# Patient Record
Sex: MALE | Race: WHITE | NOT HISPANIC OR LATINO | URBAN - METROPOLITAN AREA
[De-identification: names, ages, dates, MRNs, and addresses within clinical notes are randomized per-mention and may not be internally consistent; named-entity substitution may affect disease eponyms.]

---

## 2017-12-13 ENCOUNTER — INPATIENT (INPATIENT)
Facility: HOSPITAL | Age: 62
LOS: 15 days | Discharge: NURSING, REHAB OR LONG-TERM CARE | DRG: 269 | End: 2017-12-29
Attending: SURGERY | Admitting: SURGERY
Payer: COMMERCIAL

## 2017-12-13 VITALS
RESPIRATION RATE: 17 BRPM | OXYGEN SATURATION: 97 % | TEMPERATURE: 99 F | HEART RATE: 113 BPM | SYSTOLIC BLOOD PRESSURE: 126 MMHG | DIASTOLIC BLOOD PRESSURE: 74 MMHG | WEIGHT: 240.08 LBS

## 2017-12-13 LAB
ALBUMIN SERPL ELPH-MCNC: 4 G/DL — SIGNIFICANT CHANGE UP (ref 3.3–5)
ALP SERPL-CCNC: 79 U/L — SIGNIFICANT CHANGE UP (ref 40–120)
ALT FLD-CCNC: 29 U/L — SIGNIFICANT CHANGE UP (ref 10–45)
ANION GAP SERPL CALC-SCNC: 16 MMOL/L — SIGNIFICANT CHANGE UP (ref 5–17)
APPEARANCE UR: CLEAR — SIGNIFICANT CHANGE UP
APTT BLD: 25.5 SEC — LOW (ref 27.5–37.4)
AST SERPL-CCNC: 25 U/L — SIGNIFICANT CHANGE UP (ref 10–40)
BASOPHILS NFR BLD AUTO: 0.4 % — SIGNIFICANT CHANGE UP (ref 0–2)
BILIRUB SERPL-MCNC: 0.3 MG/DL — SIGNIFICANT CHANGE UP (ref 0.2–1.2)
BILIRUB UR-MCNC: NEGATIVE — SIGNIFICANT CHANGE UP
BUN SERPL-MCNC: 15 MG/DL — SIGNIFICANT CHANGE UP (ref 7–23)
CALCIUM SERPL-MCNC: 9.3 MG/DL — SIGNIFICANT CHANGE UP (ref 8.4–10.5)
CHLORIDE SERPL-SCNC: 93 MMOL/L — LOW (ref 96–108)
CO2 SERPL-SCNC: 27 MMOL/L — SIGNIFICANT CHANGE UP (ref 22–31)
COLOR SPEC: YELLOW — SIGNIFICANT CHANGE UP
CREAT SERPL-MCNC: 0.81 MG/DL — SIGNIFICANT CHANGE UP (ref 0.5–1.3)
DIFF PNL FLD: NEGATIVE — SIGNIFICANT CHANGE UP
EOSINOPHIL NFR BLD AUTO: 4.6 % — SIGNIFICANT CHANGE UP (ref 0–6)
GLUCOSE SERPL-MCNC: 136 MG/DL — HIGH (ref 70–99)
GLUCOSE UR QL: NEGATIVE — SIGNIFICANT CHANGE UP
HCT VFR BLD CALC: 43.9 % — SIGNIFICANT CHANGE UP (ref 39–50)
HGB BLD-MCNC: 14.3 G/DL — SIGNIFICANT CHANGE UP (ref 13–17)
INR BLD: 1.11 — SIGNIFICANT CHANGE UP (ref 0.88–1.16)
KETONES UR-MCNC: NEGATIVE — SIGNIFICANT CHANGE UP
LACTATE SERPL-SCNC: 2.4 MMOL/L — HIGH (ref 0.5–2)
LEUKOCYTE ESTERASE UR-ACNC: NEGATIVE — SIGNIFICANT CHANGE UP
LYMPHOCYTES # BLD AUTO: 9.6 % — LOW (ref 13–44)
MCHC RBC-ENTMCNC: 30.2 PG — SIGNIFICANT CHANGE UP (ref 27–34)
MCHC RBC-ENTMCNC: 32.6 G/DL — SIGNIFICANT CHANGE UP (ref 32–36)
MCV RBC AUTO: 92.8 FL — SIGNIFICANT CHANGE UP (ref 80–100)
MONOCYTES NFR BLD AUTO: 7.6 % — SIGNIFICANT CHANGE UP (ref 2–14)
NEUTROPHILS NFR BLD AUTO: 77.8 % — HIGH (ref 43–77)
NITRITE UR-MCNC: NEGATIVE — SIGNIFICANT CHANGE UP
PH UR: 6 — SIGNIFICANT CHANGE UP (ref 5–8)
PLATELET # BLD AUTO: 329 K/UL — SIGNIFICANT CHANGE UP (ref 150–400)
POTASSIUM SERPL-MCNC: 4.7 MMOL/L — SIGNIFICANT CHANGE UP (ref 3.5–5.3)
POTASSIUM SERPL-SCNC: 4.7 MMOL/L — SIGNIFICANT CHANGE UP (ref 3.5–5.3)
PROT SERPL-MCNC: 7.7 G/DL — SIGNIFICANT CHANGE UP (ref 6–8.3)
PROT UR-MCNC: NEGATIVE MG/DL — SIGNIFICANT CHANGE UP
PROTHROM AB SERPL-ACNC: 12.4 SEC — SIGNIFICANT CHANGE UP (ref 9.8–12.7)
RBC # BLD: 4.73 M/UL — SIGNIFICANT CHANGE UP (ref 4.2–5.8)
RBC # FLD: 12.9 % — SIGNIFICANT CHANGE UP (ref 10.3–16.9)
SODIUM SERPL-SCNC: 136 MMOL/L — SIGNIFICANT CHANGE UP (ref 135–145)
SP GR SPEC: <=1.005 — SIGNIFICANT CHANGE UP (ref 1–1.03)
UROBILINOGEN FLD QL: 0.2 E.U./DL — SIGNIFICANT CHANGE UP
WBC # BLD: 11.4 K/UL — HIGH (ref 3.8–10.5)
WBC # FLD AUTO: 11.4 K/UL — HIGH (ref 3.8–10.5)

## 2017-12-13 PROCEDURE — 99285 EMERGENCY DEPT VISIT HI MDM: CPT

## 2017-12-13 PROCEDURE — 75635 CT ANGIO ABDOMINAL ARTERIES: CPT | Mod: 26

## 2017-12-13 PROCEDURE — 71010: CPT | Mod: 26

## 2017-12-13 RX ORDER — PIPERACILLIN AND TAZOBACTAM 4; .5 G/20ML; G/20ML
4.5 INJECTION, POWDER, LYOPHILIZED, FOR SOLUTION INTRAVENOUS ONCE
Qty: 0 | Refills: 0 | Status: COMPLETED | OUTPATIENT
Start: 2017-12-13 | End: 2017-12-13

## 2017-12-13 RX ORDER — VANCOMYCIN HCL 1 G
1500 VIAL (EA) INTRAVENOUS ONCE
Qty: 0 | Refills: 0 | Status: COMPLETED | OUTPATIENT
Start: 2017-12-13 | End: 2017-12-13

## 2017-12-13 RX ORDER — ASPIRIN/CALCIUM CARB/MAGNESIUM 324 MG
0 TABLET ORAL
Qty: 0 | Refills: 0 | COMMUNITY

## 2017-12-13 RX ORDER — OXYCODONE AND ACETAMINOPHEN 5; 325 MG/1; MG/1
1 TABLET ORAL EVERY 4 HOURS
Qty: 0 | Refills: 0 | Status: DISCONTINUED | OUTPATIENT
Start: 2017-12-13 | End: 2017-12-19

## 2017-12-13 RX ORDER — CLOPIDOGREL BISULFATE 75 MG/1
0 TABLET, FILM COATED ORAL
Qty: 0 | Refills: 0 | COMMUNITY

## 2017-12-13 RX ORDER — SODIUM CHLORIDE 9 MG/ML
1000 INJECTION INTRAMUSCULAR; INTRAVENOUS; SUBCUTANEOUS ONCE
Qty: 0 | Refills: 0 | Status: COMPLETED | OUTPATIENT
Start: 2017-12-13 | End: 2017-12-13

## 2017-12-13 RX ORDER — MORPHINE SULFATE 50 MG/1
4 CAPSULE, EXTENDED RELEASE ORAL ONCE
Qty: 0 | Refills: 0 | Status: DISCONTINUED | OUTPATIENT
Start: 2017-12-13 | End: 2017-12-13

## 2017-12-13 RX ORDER — PIPERACILLIN AND TAZOBACTAM 4; .5 G/20ML; G/20ML
INJECTION, POWDER, LYOPHILIZED, FOR SOLUTION INTRAVENOUS
Qty: 0 | Refills: 0 | Status: DISCONTINUED | OUTPATIENT
Start: 2017-12-13 | End: 2017-12-19

## 2017-12-13 RX ORDER — HEPARIN SODIUM 5000 [USP'U]/ML
5000 INJECTION INTRAVENOUS; SUBCUTANEOUS EVERY 8 HOURS
Qty: 0 | Refills: 0 | Status: DISCONTINUED | OUTPATIENT
Start: 2017-12-13 | End: 2017-12-19

## 2017-12-13 RX ORDER — VANCOMYCIN HCL 1 G
1000 VIAL (EA) INTRAVENOUS EVERY 12 HOURS
Qty: 0 | Refills: 0 | Status: DISCONTINUED | OUTPATIENT
Start: 2017-12-13 | End: 2017-12-13

## 2017-12-13 RX ORDER — ASPIRIN/CALCIUM CARB/MAGNESIUM 324 MG
81 TABLET ORAL DAILY
Qty: 0 | Refills: 0 | Status: DISCONTINUED | OUTPATIENT
Start: 2017-12-13 | End: 2017-12-19

## 2017-12-13 RX ORDER — VANCOMYCIN HCL 1 G
1500 VIAL (EA) INTRAVENOUS EVERY 12 HOURS
Qty: 0 | Refills: 0 | Status: DISCONTINUED | OUTPATIENT
Start: 2017-12-14 | End: 2017-12-19

## 2017-12-13 RX ORDER — PIPERACILLIN AND TAZOBACTAM 4; .5 G/20ML; G/20ML
4.5 INJECTION, POWDER, LYOPHILIZED, FOR SOLUTION INTRAVENOUS EVERY 6 HOURS
Qty: 0 | Refills: 0 | Status: DISCONTINUED | OUTPATIENT
Start: 2017-12-14 | End: 2017-12-19

## 2017-12-13 RX ORDER — CLOPIDOGREL BISULFATE 75 MG/1
75 TABLET, FILM COATED ORAL DAILY
Qty: 0 | Refills: 0 | Status: DISCONTINUED | OUTPATIENT
Start: 2017-12-13 | End: 2017-12-19

## 2017-12-13 RX ORDER — OXYCODONE AND ACETAMINOPHEN 5; 325 MG/1; MG/1
2 TABLET ORAL EVERY 4 HOURS
Qty: 0 | Refills: 0 | Status: DISCONTINUED | OUTPATIENT
Start: 2017-12-13 | End: 2017-12-19

## 2017-12-13 RX ORDER — SODIUM CHLORIDE 9 MG/ML
1000 INJECTION INTRAMUSCULAR; INTRAVENOUS; SUBCUTANEOUS
Qty: 0 | Refills: 0 | Status: DISCONTINUED | OUTPATIENT
Start: 2017-12-13 | End: 2017-12-16

## 2017-12-13 RX ORDER — AMLODIPINE BESYLATE 2.5 MG/1
0 TABLET ORAL
Qty: 0 | Refills: 0 | COMMUNITY

## 2017-12-13 RX ADMIN — Medication 300 MILLIGRAM(S): at 20:23

## 2017-12-13 RX ADMIN — SODIUM CHLORIDE 333.33 MILLILITER(S): 9 INJECTION INTRAMUSCULAR; INTRAVENOUS; SUBCUTANEOUS at 21:10

## 2017-12-13 RX ADMIN — PIPERACILLIN AND TAZOBACTAM 200 GRAM(S): 4; .5 INJECTION, POWDER, LYOPHILIZED, FOR SOLUTION INTRAVENOUS at 23:26

## 2017-12-13 NOTE — H&P ADULT - HISTORY OF PRESENT ILLNESS
62yoM poor historian with PMHx of htn sent to ED by Dr Gonzalez for RLE nonhealing ulcers.  Was seen by wound care clinic in [facility] and was told to see Dr. Gonzalez in office today but due to being stuck in traffic patient could not make appointment and came straight to ED to be evaluated.  Patient states medial knee ulcer x1yr which began due to a fall and has had a debridement in office(11/11/17-not by Dr. Gonzalez) and posterior ankle ulcer [id number](no trauma).   Was seen by another vascular surgeon and was told patient needed surgery and patient at the time refused.  + reports d/c of asa / plavix/norvasc ~ 1 week and taking only Bumex and K+ meds now + weeping ulcerations requiring multiple dressing changes per day

## 2017-12-13 NOTE — ED ADULT NURSE NOTE - CHPI ED SYMPTOMS NEG
no chills/no vomiting/no weakness/no nausea/no numbness/no dizziness/no fever/no tingling/no decreased eating/drinking

## 2017-12-13 NOTE — ED PROVIDER NOTE - OBJECTIVE STATEMENT
patient referred to ED for vascular dr Gonzalez care per Plastics MD at home in [place] + lengthy > year long process at Lower extremities R>L of edema and wound care now grossly exacerbated this past week + reports d/c of asa / plavix/norvasc ~ 1 week and taking only Bumex and K+ meds now + weeping ulcerations requiring multiple dressing changes per day

## 2017-12-13 NOTE — ED ADULT NURSE NOTE - OBJECTIVE STATEMENT
Patient w/ dressing in place to right lower extremity, sent by Plastic Surgery MD for wound check due to worsening cellulitis. Patient w/ dressing in place to right lower extremity cellulitis, sent by Plastic Surgery MD/ wound center for wound check due to worsening cellulitis to be seen by vascular surgery and IV antibiotic. States symptoms/ wounds X 1 year,  became worse the past week.

## 2017-12-13 NOTE — ED ADULT TRIAGE NOTE - CHIEF COMPLAINT QUOTE
worsening RLE cellulitis and venous stasis wounds x 1 week.  Dressing last changed this morning.  [facility]

## 2017-12-13 NOTE — ED PROVIDER NOTE - PHYSICAL EXAMINATION
marked swelling to lower extremities with RLE with necrotic regions posterior mid calf and inferior medial to knee along with sinus tract appreciated at anterior knee + marked weeping erythema and tenderness skin with cap refill brisk warm to touch

## 2017-12-13 NOTE — ED ADULT NURSE NOTE - CHIEF COMPLAINT QUOTE
worsening RLE cellulitis and venous stasis wounds x 1 week.  Dressing last changed this morning.  [facility]

## 2017-12-13 NOTE — ED ADULT NURSE NOTE - NURSING SKIN WOUND APPEAR #2
Multiple pustules to right lower leg w/ serosanguinous drainage. Multiple pustules and ulcers to right lower leg w/ serosanguinous drainage.

## 2017-12-13 NOTE — H&P ADULT - ASSESSMENT
62yoM with RLE nonhealing ulcers and PAD  -NPO   -IVF  -IV abx- vanc/zosyn  -f/u cta  -f/u doppler  -f/u am labs  -f/u echo

## 2017-12-13 NOTE — ED PROVIDER NOTE - ATTENDING CONTRIBUTION TO CARE
62 yom pw LE edema and erythema.  increased requirement for wound dressing.    agree w/ PA, noted necrosis wound ulcer to R lower extremity medial aspect, will check labs, vascular and plastic evaluation for possible intervention

## 2017-12-13 NOTE — H&P ADULT - NSHPPHYSICALEXAM_GEN_ALL_CORE
gen- NAD  pulm- CTA b/l  cardio- s1s2 tachycardia  abd- soft nt/nd  ext- RLE- +dry necrotic ulcer right below medial knee +dry necrotic ulcer at posterior ankle +serous fluid draining +erythema up to knee  b/l LE swelling R>L  vascular- RLE- no DP/PT/POP +biphasic fem  LLE- no DP sig +mono PT/POP +biphasic fem

## 2017-12-13 NOTE — ED ADULT NURSE REASSESSMENT NOTE - NS ED NURSE REASSESS COMMENT FT1
Patient a/oX 3, anxious, c/o 4/10 right leg pain, tender to touch.  Dressings removed and leg examined;  large necrotic ulcer w/ black eschar noted on right knee area;  large necrotic irregular shaped ulcer w/ black eschar noted on right calf/ behind leg area ;  numerous small ulcers noted on knee and right lower leg w/ serous and pus like drainage and foul odor.  Right lower leg and feet severely swollen, erythematous, tenderness to touch, decreased sensation/numbness compared to left leg, unable to palpate pedal pulses.  Left lower leg and feet severely swollen, erythematous, no pain complaint, unable to palpate pedal pulses.  TREVA Perez at bedside , doppler done;  weak right femoral pulses detected by doppler;  left femoral and pedal pulses detected by doppler.  Right AC PIV #20 in place, all labs sent, blood cultures and lactate sent.  Vancomycin 1.5gm IVPB ongoing , no adverse rxn.  Vital signs stable, afebrile.  REfused Morphine ordered; states pain 4/10 and tolerable.  Will continue to monitor.

## 2017-12-13 NOTE — ED PROVIDER NOTE - MEDICAL DECISION MAKING DETAILS
vascular team incorporated into care and planning along with labs, supportive care and antibiotics commenced

## 2017-12-13 NOTE — ED ADULT NURSE REASSESSMENT NOTE - NS ED NURSE REASSESS COMMENT FT1
CT scan done.  Vancomycin IVPB ongoing, NSS 1 L bolus ongoing, no adverse rxn.  Vital signs stable, states right leg pain 4/10 and tolerable, declines offer of pain med.  US pending.  Endorsement and care received by this time by ED RN Rosemary in stable condition pending room assignment.

## 2017-12-14 LAB
ANION GAP SERPL CALC-SCNC: 10 MMOL/L — SIGNIFICANT CHANGE UP (ref 5–17)
BLD GP AB SCN SERPL QL: NEGATIVE — SIGNIFICANT CHANGE UP
BUN SERPL-MCNC: 10 MG/DL — SIGNIFICANT CHANGE UP (ref 7–23)
CALCIUM SERPL-MCNC: 9.3 MG/DL — SIGNIFICANT CHANGE UP (ref 8.4–10.5)
CHLORIDE SERPL-SCNC: 96 MMOL/L — SIGNIFICANT CHANGE UP (ref 96–108)
CO2 SERPL-SCNC: 30 MMOL/L — SIGNIFICANT CHANGE UP (ref 22–31)
CREAT SERPL-MCNC: 0.8 MG/DL — SIGNIFICANT CHANGE UP (ref 0.5–1.3)
GLUCOSE SERPL-MCNC: 159 MG/DL — HIGH (ref 70–99)
HCT VFR BLD CALC: 41.3 % — SIGNIFICANT CHANGE UP (ref 39–50)
HGB BLD-MCNC: 13.6 G/DL — SIGNIFICANT CHANGE UP (ref 13–17)
MAGNESIUM SERPL-MCNC: 2.3 MG/DL — SIGNIFICANT CHANGE UP (ref 1.6–2.6)
MCHC RBC-ENTMCNC: 31.1 PG — SIGNIFICANT CHANGE UP (ref 27–34)
MCHC RBC-ENTMCNC: 32.9 G/DL — SIGNIFICANT CHANGE UP (ref 32–36)
MCV RBC AUTO: 94.3 FL — SIGNIFICANT CHANGE UP (ref 80–100)
PHOSPHATE SERPL-MCNC: 3.2 MG/DL — SIGNIFICANT CHANGE UP (ref 2.5–4.5)
PLATELET # BLD AUTO: 306 K/UL — SIGNIFICANT CHANGE UP (ref 150–400)
POTASSIUM SERPL-MCNC: 4.5 MMOL/L — SIGNIFICANT CHANGE UP (ref 3.5–5.3)
POTASSIUM SERPL-SCNC: 4.5 MMOL/L — SIGNIFICANT CHANGE UP (ref 3.5–5.3)
RBC # BLD: 4.38 M/UL — SIGNIFICANT CHANGE UP (ref 4.2–5.8)
RBC # FLD: 13.2 % — SIGNIFICANT CHANGE UP (ref 10.3–16.9)
RH IG SCN BLD-IMP: POSITIVE — SIGNIFICANT CHANGE UP
SODIUM SERPL-SCNC: 136 MMOL/L — SIGNIFICANT CHANGE UP (ref 135–145)
WBC # BLD: 11.8 K/UL — HIGH (ref 3.8–10.5)
WBC # FLD AUTO: 11.8 K/UL — HIGH (ref 3.8–10.5)

## 2017-12-14 PROCEDURE — 93306 TTE W/DOPPLER COMPLETE: CPT | Mod: 26

## 2017-12-14 PROCEDURE — 93018 CV STRESS TEST I&R ONLY: CPT

## 2017-12-14 PROCEDURE — 93016 CV STRESS TEST SUPVJ ONLY: CPT

## 2017-12-14 PROCEDURE — 78452 HT MUSCLE IMAGE SPECT MULT: CPT | Mod: 26

## 2017-12-14 RX ORDER — POVIDONE-IODINE 5 %
1 AEROSOL (ML) TOPICAL DAILY
Qty: 0 | Refills: 0 | Status: DISCONTINUED | OUTPATIENT
Start: 2017-12-14 | End: 2017-12-19

## 2017-12-14 RX ADMIN — PIPERACILLIN AND TAZOBACTAM 200 GRAM(S): 4; .5 INJECTION, POWDER, LYOPHILIZED, FOR SOLUTION INTRAVENOUS at 12:22

## 2017-12-14 RX ADMIN — Medication 81 MILLIGRAM(S): at 12:22

## 2017-12-14 RX ADMIN — Medication 300 MILLIGRAM(S): at 06:22

## 2017-12-14 RX ADMIN — CLOPIDOGREL BISULFATE 75 MILLIGRAM(S): 75 TABLET, FILM COATED ORAL at 12:22

## 2017-12-14 RX ADMIN — HEPARIN SODIUM 5000 UNIT(S): 5000 INJECTION INTRAVENOUS; SUBCUTANEOUS at 06:22

## 2017-12-14 RX ADMIN — PIPERACILLIN AND TAZOBACTAM 200 GRAM(S): 4; .5 INJECTION, POWDER, LYOPHILIZED, FOR SOLUTION INTRAVENOUS at 06:02

## 2017-12-14 RX ADMIN — PIPERACILLIN AND TAZOBACTAM 200 GRAM(S): 4; .5 INJECTION, POWDER, LYOPHILIZED, FOR SOLUTION INTRAVENOUS at 20:30

## 2017-12-14 RX ADMIN — HEPARIN SODIUM 5000 UNIT(S): 5000 INJECTION INTRAVENOUS; SUBCUTANEOUS at 23:28

## 2017-12-14 RX ADMIN — Medication 300 MILLIGRAM(S): at 18:15

## 2017-12-14 NOTE — CONSULT NOTE ADULT - SUBJECTIVE AND OBJECTIVE BOX
63 yo M poor historian with PMHx of HTN seen in my office and told to report to ED for admission for RLE necrotic ulcers and IV antibiotics. I notified Dr. Gonzalez prior to admission, and he admitted pt in preparation for bypass surgery.  Patient developed a medial knee ulcer x1yr of unkown origin and an anterior knee ulcer which began due to a fall. His PCP attempted in-office debridment (11/11/17) but was unsucessful due to pain.  Was seen previously by another vascular surgeon and was told patient needed bypass surgery and patient at the time refused because he preferred to [place] enzymes from [place] to break up his arterial clot.  + reports d/c of asa / plavix/norvasc ~ 1 week and taking only Bumex and K+ meds now + weeping ulcerations requiring multiple dressing changes per day. He was prescribed antibiotics by his PCP but he is not taking them. 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 He is currently being worked up for bypass surgery next week and he will continue on IV antibiotics until then.    PMH:  HTN, PAD    PSH:  None    Home Medications:   · 	aspirin: Last Dose Taken: 06-Dec-2017   · 	Plavix: Last Dose Taken:    · 	amLODIPine: Last Dose Taken:      Allergies: None    .  Social:  Pt is , has a son, is from [place]. He works as a [employer]. Does not smoke, drink, drugs.    Family:  Non-contributory, negative for bleeding, clotting, and anesthesia concerns.     Physical Exam:  Vital Signs Last 24 Hrs  T(C): 37.1 (14 Dec 2017 11:27), Max: 37.1 (14 Dec 2017 11:27)  T(F): 98.8 (14 Dec 2017 11:27), Max: 98.8 (14 Dec 2017 11:27)  HR: 78 (14 Dec 2017 11:27) (78 - 133)  BP: 156/71 (14 Dec 2017 11:27) (144/72 - 167/79)  BP(mean): --  ABP: --  ABP(mean): --  RR: 17 (14 Dec 2017 11:27) (14 - 18)  SpO2: 98% (14 Dec 2017 11:27) (96% - 98%)    Physical Exam: gen- NAD  pulm- CTA b/l  cardio- s1s2 wnl  abd- soft nt/nd  ext- RLE- +dry necrotic ulcer inferior to right medial knee, open ulcer of anterior knee with tunnel and possible exposed patella. +dry necrotic ulcer at posterior ankle +serous fluid draining from entire leg. +erythema from toes to knee.  b/l LE edema R>L  vascular- RLE- no DP/PT/POP +biphasic fem LLE- no DP sig +mono PT/POP +biphasic fem	    Assessment and Plan:    Assessment:  · Assessment		  63 yo M with RLE nonhealing ulcers and PAD  -Discussed w Dr. Gonzalez - plan to take for debridement of right lower extremity wounds tomorrow to eliminate infection with use of synthetic grafts likely next week.  -NPO at midnight for OR.  -IVF at [place] per vascular.  -Will need preop labs  -IV abx- vanc/zosyn  -f/u cta, doppler, and echo per vascular.  -Plan for eventual NPWT and interval reconstruction once infection under control and lower extremity revascularized. 61 yo M poor historian with PMHx of HTN seen in my office and told to report to ED for admission for RLE necrotic ulcers and IV antibiotics. I notified Dr. Gonzalez prior to admission, and he admitted pt in preparation for bypass surgery.  Patient developed a medial knee ulcer x1yr of unkown origin and an anterior knee ulcer which began due to a fall. His PCP attempted in-office debridment (11/11/17) but was unsucessful due to pain.  Was seen previously by another vascular surgeon and was told patient needed bypass surgery and patient at the time refused because he preferred to try [place] enzymes from [place] to break up his arterial clot.  + reports d/c of asa / plavix/norvasc ~ 1 week and taking only Bumex and K+ meds now + weeping ulcerations requiring multiple dressing changes per day. He was prescribed antibiotics by his PCP but he is not taking them. 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 He is currently being worked up for bypass surgery next week and he will continue on IV antibiotics until then.    PMH:  HTN, PAD    PSH:  Tonsillectomy/adenoidectomy    Home Medications:   · 	aspirin: Last Dose Taken: 06-Dec-2017   · 	Plavix: Last Dose Taken:    · 	amLODIPine: Last Dose Taken:      Allergies: None    .  Social:  Pt is , has a son, is from [facility] and lives in [place], [place]. He works as a [employer], [employer]. Does not smoke, drink, drugs.    Family:  Non-contributory, negative for bleeding, clotting, and anesthesia concerns.     Physical Exam:  Vital Signs Last 24 Hrs  T(C): 37.1 (14 Dec 2017 11:27), Max: 37.1 (14 Dec 2017 11:27)  T(F): 98.8 (14 Dec 2017 11:27), Max: 98.8 (14 Dec 2017 11:27)  HR: 78 (14 Dec 2017 11:27) (78 - 133)  BP: 156/71 (14 Dec 2017 11:27) (144/72 - 167/79)  BP(mean): --  ABP: --  ABP(mean): --  RR: 17 (14 Dec 2017 11:27) (14 - 18)  SpO2: 98% (14 Dec 2017 11:27) (96% - 98%)    Physical Exam: gen- NAD  pulm- CTA b/l  cardio- s1s2 wnl  abd- soft nt/nd  ext- RLE- +dry necrotic ulcer inferior to right medial knee, open ulcer of anterior knee with tunnel and possible exposed patella. +dry necrotic ulcer at posterior ankle +serous fluid draining from entire leg. +erythema from toes to knee.  b/l LE edema R>L  vascular- RLE- no DP/PT/POP +biphasic fem LLE- no DP sig +mono PT/POP +biphasic fem	    Assessment and Plan:    Assessment:  · Assessment		  61 yo M with RLE nonhealing ulcers and PAD  -Discussed w Dr. Gonzalez - plan to take for debridement of right lower extremity wounds tomorrow to eliminate infection with use of synthetic grafts likely next week.  -NPO at midnight for OR.  -IVF at [facility] per vascular.  -Will need preop labs  -IV abx- vanc/zosyn  -f/u cta, doppler, and echo per vascular.  -Plan for eventual NPWT and interval reconstruction once infection under control and lower extremity revascularized.

## 2017-12-14 NOTE — PROGRESS NOTE ADULT - SUBJECTIVE AND OBJECTIVE BOX
o/n: admitted for IV abx and PAD work up; ct scan done      62yoM with RLE nonhealing ulcers and PAD  -NPO   -IVF  -IV abx- vanc/zosyn  -f/u cta read  -f/u doppler  -f/u am labs  -f/u echo o/n: admitted for IV abx and PAD work up; ct scan done      62yoM with RLE nonhealing ulcers and PAD  -NPO   -IVF  -IV abx- vanc/zosyn  -f/u doppler  -f/u am labs  -f/u echo o/n: admitted for IV abx and PAD work up; ct scan done    Subjective: Pt seen and examined at bedside. No acute complaints.   Pain (0-10):            Pain Control Adequate: [ ] YES [ ] NO        Location: ___  Nausea: [ ] YES [x ] NO            Vomiting: [ ] YES [x ] NO  Diarrhea: [ ] YES [x ] NO         Constipation: [ ] YES [x ] NO     Chest Pain: [ ] YES [x ] NO    SOB:  [ ] YES [ x] NO    MEDICATIONS  (STANDING):  aspirin enteric coated 81 milliGRAM(s) Oral daily  clopidogrel Tablet 75 milliGRAM(s) Oral daily  heparin  Injectable 5000 Unit(s) SubCutaneous every 8 hours  piperacillin/tazobactam IVPB.      piperacillin/tazobactam IVPB. 4.5 Gram(s) IV Intermittent every 6 hours  povidone iodine 10% Solution 1 Application(s) Topical daily  sodium chloride 0.9%. 1000 milliLiter(s) (60 mL/Hr) IV Continuous <Continuous>  vancomycin  IVPB 1500 milliGRAM(s) IV Intermittent every 12 hours    MEDICATIONS  (PRN):  oxyCODONE    5 mG/acetaminophen 325 mG 2 Tablet(s) Oral every 4 hours PRN Severe Pain (7 - 10)  oxyCODONE    5 mG/acetaminophen 325 mG 1 Tablet(s) Oral every 4 hours PRN Moderate Pain (4 - 6)    aspirin enteric coated 81 milliGRAM(s) Oral daily  clopidogrel Tablet 75 milliGRAM(s) Oral daily  heparin  Injectable 5000 Unit(s) SubCutaneous every 8 hours  oxyCODONE    5 mG/acetaminophen 325 mG 2 Tablet(s) Oral every 4 hours PRN  oxyCODONE    5 mG/acetaminophen 325 mG 1 Tablet(s) Oral every 4 hours PRN  piperacillin/tazobactam IVPB.      piperacillin/tazobactam IVPB. 4.5 Gram(s) IV Intermittent every 6 hours  povidone iodine 10% Solution 1 Application(s) Topical daily  sodium chloride 0.9%. 1000 milliLiter(s) IV Continuous <Continuous>  vancomycin  IVPB 1500 milliGRAM(s) IV Intermittent every 12 hours    Allergies    No Known Allergies    Intolerances          Vital Signs Last 24 Hrs  T(C): 36.9 (14 Dec 2017 06:44), Max: 37.5 (13 Dec 2017 20:38)  T(F): 98.5 (14 Dec 2017 06:44), Max: 99.5 (13 Dec 2017 20:38)  HR: 133 (14 Dec 2017 06:44) (97 - 133)  BP: 167/79 (14 Dec 2017 06:44) (126/74 - 168/95)  BP(mean): --  RR: 14 (14 Dec 2017 06:44) (14 - 18)  SpO2: 98% (14 Dec 2017 06:44) (96% - 98%)    I&O's Summary      Physical Exam:  General: NAD, resting comfortably  Pulmonary: normal resp effort  Cardiovascular: NSR  Abdominal: soft, NT/ND  Extremities: BL LE edema R>L  RLE edema and erythema with weeping to mid shin multiple areas of macerated skin, 5x3 area of eschar to superior medial calf. eschar on heal.    Neuro: A/O x 3, no focal deficits, sensation normal    Lines/drains/tubes:    LABS:                        13.6   11.8  )-----------( 306      ( 14 Dec 2017 06:02 )             41.3     12-14    136  |  96  |  10  ----------------------------<  159<H>  4.5   |  30  |  0.80    Ca    9.3      14 Dec 2017 06:02  Phos  3.2     12-14  Mg     2.3     12-14    TPro  7.7  /  Alb  4.0  /  TBili  0.3  /  DBili  x   /  AST  25  /  ALT  29  /  AlkPhos  79  12-13    PT/INR - ( 13 Dec 2017 20:11 )   PT: 12.4 sec;   INR: 1.11          PTT - ( 13 Dec 2017 20:11 )  PTT:25.5 sec  Urinalysis Basic - ( 13 Dec 2017 22:44 )    Color: Yellow / Appearance: Clear / SG: <=1.005 / pH: x  Gluc: x / Ketone: NEGATIVE  / Bili: Negative / Urobili: 0.2 E.U./dL   Blood: x / Protein: NEGATIVE mg/dL / Nitrite: NEGATIVE   Leuk Esterase: NEGATIVE / RBC: x / WBC x   Sq Epi: x / Non Sq Epi: x / Bacteria: x      LIVER FUNCTIONS - ( 13 Dec 2017 20:11 )  Alb: 4.0 g/dL / Pro: 7.7 g/dL / ALK PHOS: 79 U/L / ALT: 29 U/L / AST: 25 U/L / GGT: x           CAPILLARY BLOOD GLUCOSE          RADIOLOGY & ADDITIONAL TESTS:      62yoM with RLE nonhealing ulcers and PAD  -NPO   -IVF  -IV abx- vanc/zosyn  -f/u doppler  -f/u am labs  -f/u echo  -cardiology consult (Phylicia)

## 2017-12-15 ENCOUNTER — RESULT REVIEW (OUTPATIENT)
Age: 62
End: 2017-12-15

## 2017-12-15 LAB
ANION GAP SERPL CALC-SCNC: 12 MMOL/L — SIGNIFICANT CHANGE UP (ref 5–17)
BUN SERPL-MCNC: 9 MG/DL — SIGNIFICANT CHANGE UP (ref 7–23)
CALCIUM SERPL-MCNC: 9.1 MG/DL — SIGNIFICANT CHANGE UP (ref 8.4–10.5)
CHLORIDE SERPL-SCNC: 100 MMOL/L — SIGNIFICANT CHANGE UP (ref 96–108)
CO2 SERPL-SCNC: 29 MMOL/L — SIGNIFICANT CHANGE UP (ref 22–31)
CREAT SERPL-MCNC: 0.94 MG/DL — SIGNIFICANT CHANGE UP (ref 0.5–1.3)
CULTURE RESULTS: NO GROWTH — SIGNIFICANT CHANGE UP
GLUCOSE SERPL-MCNC: 155 MG/DL — HIGH (ref 70–99)
GRAM STN FLD: SIGNIFICANT CHANGE UP
HCT VFR BLD CALC: 39.4 % — SIGNIFICANT CHANGE UP (ref 39–50)
HGB BLD-MCNC: 12.7 G/DL — LOW (ref 13–17)
MAGNESIUM SERPL-MCNC: 2.2 MG/DL — SIGNIFICANT CHANGE UP (ref 1.6–2.6)
MCHC RBC-ENTMCNC: 30.5 PG — SIGNIFICANT CHANGE UP (ref 27–34)
MCHC RBC-ENTMCNC: 32.2 G/DL — SIGNIFICANT CHANGE UP (ref 32–36)
MCV RBC AUTO: 94.7 FL — SIGNIFICANT CHANGE UP (ref 80–100)
PHOSPHATE SERPL-MCNC: 3.6 MG/DL — SIGNIFICANT CHANGE UP (ref 2.5–4.5)
PLATELET # BLD AUTO: 309 K/UL — SIGNIFICANT CHANGE UP (ref 150–400)
POTASSIUM SERPL-MCNC: 5 MMOL/L — SIGNIFICANT CHANGE UP (ref 3.5–5.3)
POTASSIUM SERPL-SCNC: 5 MMOL/L — SIGNIFICANT CHANGE UP (ref 3.5–5.3)
RBC # BLD: 4.16 M/UL — LOW (ref 4.2–5.8)
RBC # FLD: 12.9 % — SIGNIFICANT CHANGE UP (ref 10.3–16.9)
SODIUM SERPL-SCNC: 141 MMOL/L — SIGNIFICANT CHANGE UP (ref 135–145)
SPECIMEN SOURCE: SIGNIFICANT CHANGE UP
WBC # BLD: 8.7 K/UL — SIGNIFICANT CHANGE UP (ref 3.8–10.5)
WBC # FLD AUTO: 8.7 K/UL — SIGNIFICANT CHANGE UP (ref 3.8–10.5)

## 2017-12-15 PROCEDURE — 99222 1ST HOSP IP/OBS MODERATE 55: CPT

## 2017-12-15 RX ORDER — HYDRALAZINE HCL 50 MG
5 TABLET ORAL ONCE
Qty: 0 | Refills: 0 | Status: COMPLETED | OUTPATIENT
Start: 2017-12-15 | End: 2017-12-15

## 2017-12-15 RX ORDER — HYDROMORPHONE HYDROCHLORIDE 2 MG/ML
1 INJECTION INTRAMUSCULAR; INTRAVENOUS; SUBCUTANEOUS
Qty: 0 | Refills: 0 | Status: DISCONTINUED | OUTPATIENT
Start: 2017-12-15 | End: 2017-12-19

## 2017-12-15 RX ORDER — SODIUM HYPOCHLORITE 0.125 %
1 SOLUTION, NON-ORAL MISCELLANEOUS
Qty: 0 | Refills: 0 | Status: DISCONTINUED | OUTPATIENT
Start: 2017-12-15 | End: 2017-12-19

## 2017-12-15 RX ORDER — ACETAMINOPHEN 500 MG
325 TABLET ORAL EVERY 4 HOURS
Qty: 0 | Refills: 0 | Status: DISCONTINUED | OUTPATIENT
Start: 2017-12-15 | End: 2017-12-19

## 2017-12-15 RX ORDER — HYDROMORPHONE HYDROCHLORIDE 2 MG/ML
0.5 INJECTION INTRAMUSCULAR; INTRAVENOUS; SUBCUTANEOUS
Qty: 0 | Refills: 0 | Status: DISCONTINUED | OUTPATIENT
Start: 2017-12-15 | End: 2017-12-19

## 2017-12-15 RX ORDER — SODIUM HYPOCHLORITE 0.125 %
1 SOLUTION, NON-ORAL MISCELLANEOUS ONCE
Qty: 0 | Refills: 0 | Status: DISCONTINUED | OUTPATIENT
Start: 2017-12-15 | End: 2017-12-19

## 2017-12-15 RX ORDER — LABETALOL HCL 100 MG
10 TABLET ORAL ONCE
Qty: 0 | Refills: 0 | Status: COMPLETED | OUTPATIENT
Start: 2017-12-15 | End: 2017-12-15

## 2017-12-15 RX ADMIN — HEPARIN SODIUM 5000 UNIT(S): 5000 INJECTION INTRAVENOUS; SUBCUTANEOUS at 13:56

## 2017-12-15 RX ADMIN — HEPARIN SODIUM 5000 UNIT(S): 5000 INJECTION INTRAVENOUS; SUBCUTANEOUS at 06:40

## 2017-12-15 RX ADMIN — Medication 300 MILLIGRAM(S): at 22:38

## 2017-12-15 RX ADMIN — HYDROMORPHONE HYDROCHLORIDE 0.5 MILLIGRAM(S): 2 INJECTION INTRAMUSCULAR; INTRAVENOUS; SUBCUTANEOUS at 20:04

## 2017-12-15 RX ADMIN — Medication 81 MILLIGRAM(S): at 11:13

## 2017-12-15 RX ADMIN — PIPERACILLIN AND TAZOBACTAM 200 GRAM(S): 4; .5 INJECTION, POWDER, LYOPHILIZED, FOR SOLUTION INTRAVENOUS at 23:40

## 2017-12-15 RX ADMIN — CLOPIDOGREL BISULFATE 75 MILLIGRAM(S): 75 TABLET, FILM COATED ORAL at 11:13

## 2017-12-15 RX ADMIN — PIPERACILLIN AND TAZOBACTAM 200 GRAM(S): 4; .5 INJECTION, POWDER, LYOPHILIZED, FOR SOLUTION INTRAVENOUS at 13:56

## 2017-12-15 RX ADMIN — Medication 325 MILLIGRAM(S): at 23:40

## 2017-12-15 RX ADMIN — HYDROMORPHONE HYDROCHLORIDE 0.5 MILLIGRAM(S): 2 INJECTION INTRAMUSCULAR; INTRAVENOUS; SUBCUTANEOUS at 20:28

## 2017-12-15 RX ADMIN — Medication 10 MILLIGRAM(S): at 07:12

## 2017-12-15 RX ADMIN — HEPARIN SODIUM 5000 UNIT(S): 5000 INJECTION INTRAVENOUS; SUBCUTANEOUS at 22:40

## 2017-12-15 RX ADMIN — Medication 5 MILLIGRAM(S): at 01:40

## 2017-12-15 RX ADMIN — PIPERACILLIN AND TAZOBACTAM 200 GRAM(S): 4; .5 INJECTION, POWDER, LYOPHILIZED, FOR SOLUTION INTRAVENOUS at 08:00

## 2017-12-15 RX ADMIN — Medication 325 MILLIGRAM(S): at 22:40

## 2017-12-15 RX ADMIN — Medication 300 MILLIGRAM(S): at 06:40

## 2017-12-15 RX ADMIN — PIPERACILLIN AND TAZOBACTAM 200 GRAM(S): 4; .5 INJECTION, POWDER, LYOPHILIZED, FOR SOLUTION INTRAVENOUS at 02:07

## 2017-12-15 NOTE — PROGRESS NOTE ADULT - SUBJECTIVE AND OBJECTIVE BOX
o/n: HERMES  12/14: got ECHO EF 70%. Stress test normal.       63 yo M with RLE nonhealing ulcers and PAD  -Discussed w Dr. Gonzalez - plan to take for debridement of right lower extremity wounds tomorrow to eliminate infection with use of synthetic grafts likely next week.  -NPO for OR.  -IVF  -IV abx- vanc/zosyn  -f/u doppler  -Plan for eventual NPWT and interval reconstruction once infection under control and lower extremity revascularized. o/n: HERMES  12/14: got ECHO EF 70%. Stress test normal.     piperacillin/tazobactam IVPB.   piperacillin/tazobactam IVPB. 4.5  vancomycin  IVPB 1500  aspirin enteric coated 81  clopidogrel Tablet 75  heparin  Injectable 5000  piperacillin/tazobactam IVPB.   piperacillin/tazobactam IVPB. 4.5  vancomycin  IVPB 1500      Allergies    No Known Allergies    Intolerances        Vital Signs Last 24 Hrs  T(C): 37 (15 Dec 2017 08:42), Max: 37.1 (14 Dec 2017 11:27)  T(F): 98.6 (15 Dec 2017 08:42), Max: 98.8 (14 Dec 2017 11:27)  HR: 76 (15 Dec 2017 08:36) (76 - 100)  BP: 161/67 (15 Dec 2017 08:36) (152/70 - 194/57)  BP(mean): 107 (14 Dec 2017 21:07) (107 - 107)  RR: 16 (15 Dec 2017 08:36) (15 - 17)  SpO2: 95% (15 Dec 2017 08:36) (95% - 98%)  I&O's Summary    14 Dec 2017 07:01  -  15 Dec 2017 07:00  --------------------------------------------------------  IN: 0 mL / OUT: 950 mL / NET: -950 mL        Physical Exam:  General: AAOx3, NAD  Pulmonary:  Cardiovascular:  Abdominal:  Extremities: BL LE edema R>L  RLE edema and improving skin maceration with small superficial ulcer over medial aspect of the calf,  5x3cm ulcer with escar on the superior medial aspect of the upper shin.   Pulses: no pulses or doppler signals in the feet      LABS:                        12.7   8.7   )-----------( 309      ( 15 Dec 2017 06:33 )             39.4     12-15    141  |  100  |  9   ----------------------------<  155<H>  5.0   |  29  |  0.94    Ca    9.1      15 Dec 2017 06:33  Phos  3.6     12-15  Mg     2.2     12-15    TPro  7.7  /  Alb  4.0  /  TBili  0.3  /  DBili  x   /  AST  25  /  ALT  29  /  AlkPhos  79  12-13    PT/INR - ( 13 Dec 2017 20:11 )   PT: 12.4 sec;   INR: 1.11          PTT - ( 13 Dec 2017 20:11 )  PTT:25.5 sec    Radiology and Additional Studies:  63 yo M with RLE nonhealing ulcers and PAD  -Discussed w Dr. Gonzalez - plan to take for debridement of right lower extremity wounds tomorrow to eliminate infection with use of synthetic grafts likely next week.  -NPO for OR with plastics for wound debridement  -IVF  -IV abx- vanc/zosyn  -f/u doppler  -Plan for eventual NPWT and interval reconstruction once infection under control and lower extremity revascularized.

## 2017-12-15 NOTE — PROGRESS NOTE ADULT - SUBJECTIVE AND OBJECTIVE BOX
S:63 yo M poor historian with PMHx of HTN and PVD admitted for RLE necrotic ulcers and IV antibiotics. He is currently being worked up for bypass surgery next week and he will continue on IV antibiotics until then. Stress test/echo shows good LV function and no ischemia. Overnight, slept well, pain improving on IV antibiotics, has been NPO for OR today.    O:  Vital Signs Last 24 Hrs  T(C): 37 (15 Dec 2017 08:42), Max: 37.1 (14 Dec 2017 11:27)  T(F): 98.6 (15 Dec 2017 08:42), Max: 98.8 (14 Dec 2017 11:27)  HR: 76 (15 Dec 2017 08:36) (76 - 100)  BP: 161/67 (15 Dec 2017 08:36) (152/70 - 194/57)  BP(mean): 107 (14 Dec 2017 21:07) (107 - 107)  ABP: --  ABP(mean): --  RR: 16 (15 Dec 2017 08:36) (15 - 17)  SpO2: 95% (15 Dec 2017 08:36) (95% - 98%)  I&O's Detail    14 Dec 2017 07:01  -  15 Dec 2017 07:00  --------------------------------------------------------  IN:  Total IN: 0 mL    OUT:    Voided: 950 mL  Total OUT: 950 mL    Total NET: -950 mL          Physical Exam: gen- NAD  pulm- CTA b/l  cardio- s1s2 wnl  abd- soft nt/nd  ext- RLE- +dry necrotic ulcer inferior to right medial knee, open ulcer of anterior knee with tunnel and possible exposed patella. +dry necrotic ulcer at posterior ankle +serous fluid draining from entire leg. +erythema from toes to knee.  b/l LE edema R>L. Cellulitis improved even since yesterday PM.  vascular- RLE- no DP/PT/POP +biphasic fem LLE- no DP sig +mono PT/POP +biphasic fem	                        12.7   8.7   )-----------( 309      ( 15 Dec 2017 06:33 )             39.4   12-15    141  |  100  |  9   ----------------------------<  155<H>  5.0   |  29  |  0.94    Ca    9.1      15 Dec 2017 06:33  Phos  3.6     12-15  Mg     2.2     12-15    TPro  7.7  /  Alb  4.0  /  TBili  0.3  /  DBili  x   /  AST  25  /  ALT  29  /  AlkPhos  79  12-13      Assessment and Plan:    Assessment:  · Assessment		  63 yo M with RLE nonhealing ulcers and PAD and extensive RLE cellulitis.  -NPO/IVF in preparation for debridement of right lower extremity wounds this afternoon to eliminate infection before use of synthetic grafts likely next week.  -IV abx- vanc/zosyn  -Plan for eventual NPWT and interval reconstruction once infection under control and lower extremity revascularized.

## 2017-12-15 NOTE — CONSULT NOTE ADULT - SUBJECTIVE AND OBJECTIVE BOX
REASON FOR CONSULT:    HISTORY OF PRESENT ILLNESS:  62yoM poor historian with PMHx of htn sent to ED by Dr Gonzalez for RLE nonhealing ulcers.  [place] [facility] [place] Dr. Gonzalez in office today but due to being stuck in traffic patient could not make appointment and came straight to ED to be evaluated.  Patient states medial knee ulcer x1yr which began due to a fall and has had a debridement in office(11/11/17-not by Dr. Gonzalez) and posterior ankle ulcer [id number](no trauma).   Was seen by another vascular surgeon and was told patient needed surgery and patient at the time refused.  + reports d/c of asa / plavix/norvasc ~ 1 week and taking only Bumex and K+ meds now + weeping ulcerations requiring multiple dressing changes per day    PAST MEDICAL & SURGICAL HISTORY:  HTN (hypertension)  Cellulitis      [ ] Diabetes   [ ] Hypertension  [ ] Hyperlipidemia  [ ] CAD  [ ] PCI  [ ] CABG    PREVIOUS DIAGNOSTIC TESTING:    [ ] Echocardiogram:  [ ]  Catheterization:  [ ] Stress Test:  	    MEDICATIONS:    piperacillin/tazobactam IVPB.      piperacillin/tazobactam IVPB. 4.5 Gram(s) IV Intermittent every 6 hours  vancomycin  IVPB 1500 milliGRAM(s) IV Intermittent every 12 hours      oxyCODONE    5 mG/acetaminophen 325 mG 2 Tablet(s) Oral every 4 hours PRN  oxyCODONE    5 mG/acetaminophen 325 mG 1 Tablet(s) Oral every 4 hours PRN        aspirin enteric coated 81 milliGRAM(s) Oral daily  clopidogrel Tablet 75 milliGRAM(s) Oral daily  heparin  Injectable 5000 Unit(s) SubCutaneous every 8 hours  povidone iodine 10% Solution 1 Application(s) Topical daily  sodium chloride 0.9%. 1000 milliLiter(s) IV Continuous <Continuous>      FAMILY HISTORY:      SOCIAL HISTORY:    [ ] Non-smoker  [ ] Smoker  [ ] Alcohol    Allergies    No Known Allergies    Intolerances    	    REVIEW OF SYSTEMS:    [x] as per HPI  CONSTITUTIONAL: No fever, weight loss, or fatigue  ENT:  No difficulty hearing, tinnitus, vertigo; No sinus or throat pain  RESPIRATORY: No cough, wheezing, chills or hemoptysis; No Shortness of Breath  CARDIOVASCULAR: No chest pain, palpitations, dizziness, or leg swelling  GASTROINTESTINAL: No abdominal or epigastric pain. No nausea, vomiting, or hematemesis; No diarrhea or constipation. No melena or hematochezia.  GENITOURINARY: No dysuria, frequency, hematuria, or incontinence  NEUROLOGICAL: No headaches, memory loss, loss of strength, numbness, or tremors  MUSCULOSKELETAL: No joint pain or swelling; No muscle, back, or extremity pain  [x] All others negative	  [ ] Unable to obtain    PHYSICAL EXAM:  T(C): 37 (12-15-17 @ 08:42), Max: 37 (12-15-17 @ 08:42)  HR: 84 (12-15-17 @ 11:15) (76 - 100)  BP: 156/68 (12-15-17 @ 11:15) (152/70 - 194/57)  RR: 16 (12-15-17 @ 11:15) (15 - 16)  SpO2: 98% (12-15-17 @ 11:15) (95% - 98%)  Wt(kg): --  I&O's Summary    14 Dec 2017 07:01  -  15 Dec 2017 07:00  --------------------------------------------------------  IN: 0 mL / OUT: 950 mL / NET: -950 mL    15 Dec 2017 07:01  -  15 Dec 2017 14:03  --------------------------------------------------------  IN: 0 mL / OUT: 580 mL / NET: -580 mL        Appearance: Normal	  HEENT:   Normal oral mucosa, PERRL, EOMI	  Lymphatic: No lymphadenopathy  Cardiovascular: Normal S1 S2, No JVD, No murmurs, No edema  Respiratory: Lungs clear to auscultation	  Psychiatry: A & O x 3, Mood & affect appropriate  Gastrointestinal:  Soft, Non-tender, + BS	  Skin: No rashes, No ecchymoses, No cyanosis	  Neurologic: Non-focal  Extremities: Normal range of motion, No clubbing, cyanosis or edema  Vascular: Peripheral pulses palpable 2+ bilaterally    TELEMETRY: 	  no events  ECG:  < from: 12 Lead ECG (12.13.17 @ 22:54) >  Diagnosis Line Sinus tachycardia  Possible Left atrial enlargement    < end of copied text >    ECHO:< from: Echocardiogram (12.14.17 @ 10:06) >  There is borderline concentric left ventricular hypertrophy.The left   ventricular wall motion is normal.The left ventricular ejection fraction   is   normal.The left ventricular ejection fraction is 70%.The right ventricle   is   normal in size and function.The left atrial size is normal.Right atrial   size   is normal.Mildly calciifed trileaflet aortic valve.No aortic   regurgitation   noted.Structurally normal mitral valve.No mitral regurgitation   noted.Structurally normal tricuspid valve.No tricuspid regurgitation   noted.There was insufficient TR detected from which to calculate   pulmonary   artery systolic pressure.  The pulmonic valve is not well   visualized.There is   no pericardial effusion.    < end of copied text >    STRESS:< from: Pharm Thallium Stress (Lexiscan) -LEXMIB (12.14.17 @ 16:12) >  Myocardial perfusion imaging is normal. Overall left ventricular systolic   function is normal without regional wall motion abnormalities. The EKG   stress test is normal.    < end of copied text >    CATH:  	  RADIOLOGY:  CXR:< from: Xray Chest 1 View AP-PORTABLE IMMEDIATE (12.13.17 @ 22:17) >  Impression: Hyperaerated lungs bilaterally, which may be due toemphysema.    < end of copied text >    CT:  US:   	  	  LABS:	 	    CARDIAC MARKERS:                                  12.7   8.7   )-----------( 309      ( 15 Dec 2017 06:33 )             39.4     12-15    141  |  100  |  9   ----------------------------<  155<H>  5.0   |  29  |  0.94    Ca    9.1      15 Dec 2017 06:33  Phos  3.6     12-15  Mg     2.2     12-15    TPro  7.7  /  Alb  4.0  /  TBili  0.3  /  DBili  x   /  AST  25  /  ALT  29  /  AlkPhos  79  12-13    proBNP:   Lipid Profile:   HgA1c:   TSH:     ASSESSMENT/PLAN: 	    #Periop CV mgmt  RCRI/Miranda Intermediate  Normal Echo  Normal Stress test  no chest pain able to lay flat  Start lopressor 12.5bid  Cardiac optimized for surgery    #CAD - no chest pain continue asa/plavix per vascular    #HTN - pain control, start lopressor 12.5bid, consider norvasc 5mg if SBP>160 sustained    #CV Prevention  q3mo Fasting Lipid Profile, Goal LDL<100, statin as tolerated  q6week TSH  q3mo 25-OH Vitamin D Level, Goal 50, supplement as tolerated

## 2017-12-16 LAB
ANION GAP SERPL CALC-SCNC: 19 MMOL/L — HIGH (ref 5–17)
BUN SERPL-MCNC: 7 MG/DL — SIGNIFICANT CHANGE UP (ref 7–23)
CALCIUM SERPL-MCNC: 9.5 MG/DL — SIGNIFICANT CHANGE UP (ref 8.4–10.5)
CHLORIDE SERPL-SCNC: 94 MMOL/L — LOW (ref 96–108)
CO2 SERPL-SCNC: 23 MMOL/L — SIGNIFICANT CHANGE UP (ref 22–31)
CREAT SERPL-MCNC: 0.86 MG/DL — SIGNIFICANT CHANGE UP (ref 0.5–1.3)
GLUCOSE SERPL-MCNC: 127 MG/DL — HIGH (ref 70–99)
HCT VFR BLD CALC: 40.5 % — SIGNIFICANT CHANGE UP (ref 39–50)
HGB BLD-MCNC: 12.8 G/DL — LOW (ref 13–17)
MAGNESIUM SERPL-MCNC: 2.2 MG/DL — SIGNIFICANT CHANGE UP (ref 1.6–2.6)
MCHC RBC-ENTMCNC: 30.1 PG — SIGNIFICANT CHANGE UP (ref 27–34)
MCHC RBC-ENTMCNC: 31.6 G/DL — LOW (ref 32–36)
MCV RBC AUTO: 95.3 FL — SIGNIFICANT CHANGE UP (ref 80–100)
NIGHT BLUE STAIN TISS: SIGNIFICANT CHANGE UP
PHOSPHATE SERPL-MCNC: 4.2 MG/DL — SIGNIFICANT CHANGE UP (ref 2.5–4.5)
PLATELET # BLD AUTO: 387 K/UL — SIGNIFICANT CHANGE UP (ref 150–400)
POTASSIUM SERPL-MCNC: 4.2 MMOL/L — SIGNIFICANT CHANGE UP (ref 3.5–5.3)
POTASSIUM SERPL-SCNC: 4.2 MMOL/L — SIGNIFICANT CHANGE UP (ref 3.5–5.3)
RBC # BLD: 4.25 M/UL — SIGNIFICANT CHANGE UP (ref 4.2–5.8)
RBC # FLD: 13.1 % — SIGNIFICANT CHANGE UP (ref 10.3–16.9)
SODIUM SERPL-SCNC: 136 MMOL/L — SIGNIFICANT CHANGE UP (ref 135–145)
SPECIMEN SOURCE: SIGNIFICANT CHANGE UP
VANCOMYCIN TROUGH SERPL-MCNC: 18.1 UG/ML — SIGNIFICANT CHANGE UP (ref 10–20)
WBC # BLD: 11.8 K/UL — HIGH (ref 3.8–10.5)
WBC # FLD AUTO: 11.8 K/UL — HIGH (ref 3.8–10.5)

## 2017-12-16 PROCEDURE — 93970 EXTREMITY STUDY: CPT | Mod: 26

## 2017-12-16 RX ADMIN — OXYCODONE AND ACETAMINOPHEN 2 TABLET(S): 5; 325 TABLET ORAL at 03:26

## 2017-12-16 RX ADMIN — OXYCODONE AND ACETAMINOPHEN 2 TABLET(S): 5; 325 TABLET ORAL at 23:25

## 2017-12-16 RX ADMIN — PIPERACILLIN AND TAZOBACTAM 200 GRAM(S): 4; .5 INJECTION, POWDER, LYOPHILIZED, FOR SOLUTION INTRAVENOUS at 17:58

## 2017-12-16 RX ADMIN — OXYCODONE AND ACETAMINOPHEN 1 TABLET(S): 5; 325 TABLET ORAL at 17:20

## 2017-12-16 RX ADMIN — OXYCODONE AND ACETAMINOPHEN 2 TABLET(S): 5; 325 TABLET ORAL at 02:41

## 2017-12-16 RX ADMIN — OXYCODONE AND ACETAMINOPHEN 2 TABLET(S): 5; 325 TABLET ORAL at 22:25

## 2017-12-16 RX ADMIN — OXYCODONE AND ACETAMINOPHEN 2 TABLET(S): 5; 325 TABLET ORAL at 07:06

## 2017-12-16 RX ADMIN — OXYCODONE AND ACETAMINOPHEN 1 TABLET(S): 5; 325 TABLET ORAL at 18:20

## 2017-12-16 RX ADMIN — Medication 81 MILLIGRAM(S): at 14:22

## 2017-12-16 RX ADMIN — HEPARIN SODIUM 5000 UNIT(S): 5000 INJECTION INTRAVENOUS; SUBCUTANEOUS at 22:14

## 2017-12-16 RX ADMIN — Medication 1 APPLICATION(S): at 11:30

## 2017-12-16 RX ADMIN — Medication 300 MILLIGRAM(S): at 22:14

## 2017-12-16 RX ADMIN — CLOPIDOGREL BISULFATE 75 MILLIGRAM(S): 75 TABLET, FILM COATED ORAL at 14:22

## 2017-12-16 RX ADMIN — HEPARIN SODIUM 5000 UNIT(S): 5000 INJECTION INTRAVENOUS; SUBCUTANEOUS at 05:38

## 2017-12-16 RX ADMIN — Medication 300 MILLIGRAM(S): at 11:00

## 2017-12-16 RX ADMIN — HEPARIN SODIUM 5000 UNIT(S): 5000 INJECTION INTRAVENOUS; SUBCUTANEOUS at 16:34

## 2017-12-16 RX ADMIN — PIPERACILLIN AND TAZOBACTAM 200 GRAM(S): 4; .5 INJECTION, POWDER, LYOPHILIZED, FOR SOLUTION INTRAVENOUS at 07:05

## 2017-12-16 NOTE — PROGRESS NOTE ADULT - SUBJECTIVE AND OBJECTIVE BOX
SUBJECTIVE:  Doing well.   No overnight events.     OBJECTIVE:     ** VITAL SIGNS / I&O's **    T(C): 37.2 (12-16-17 @ 08:18), Max: 37.3 (12-15-17 @ 21:40)  T(F): 98.9 (12-16-17 @ 08:18), Max: 99.1 (12-15-17 @ 21:40)  HR: 71 (12-16-17 @ 08:35) (58 - 100)  BP: 132/60 (12-16-17 @ 08:35) (128/57 - 188/65)  RR: 18 (12-16-17 @ 08:35) (12 - 18)  SpO2: 94% (12-16-17 @ 08:35) (91% - 100%)      15 Dec 2017 07:01  -  16 Dec 2017 07:00  --------------------------------------------------------  IN:    sodium chloride 0.9%.: 150 mL  Total IN: 150 mL    OUT:    Voided: 2100 mL  Total OUT: 2100 mL    Total NET: -1950 mL          ** PHYSICAL EXAM **    -- CONSTITUTIONAL: AOx3. NAD.   -- HEENT:  -- NECK:   -- CARDIOVASCULAR: Regular rate and rhythm. S1, S2.  -- RESPIRATORY: Bilateral breath sounds.   -- CHEST:  -- ABDOMEN:   -- EXTREMITIES:   -- VASCULAR:   -- NEUROLOGICAL:     ** LABS **                 12.8   11.8   )----------(  387       ( 16 Dec 2017 08:32 )               40.5      136    |  94     |  7      ----------------------------<  127        ( 16 Dec 2017 08:32 )  4.2     |  23     |  0.86     Ca    9.5        ( 16 Dec 2017 08:32 )  Phos  4.2       ( 16 Dec 2017 08:32 )  Mg     2.2       ( 16 Dec 2017 08:32 )          CAPILLARY BLOOD GLUCOSE

## 2017-12-16 NOTE — PROGRESS NOTE ADULT - SUBJECTIVE AND OBJECTIVE BOX
o/n: HERMES  12/15: s/p OR for RLE debridement with plastics      piperacillin/tazobactam IVPB.   piperacillin/tazobactam IVPB. 4.5  vancomycin  IVPB 1500  aspirin enteric coated 81  clopidogrel Tablet 75  heparin  Injectable 5000  piperacillin/tazobactam IVPB.   piperacillin/tazobactam IVPB. 4.5  vancomycin  IVPB 1500      Allergies    No Known Allergies    Intolerances        Vital Signs Last 24 Hrs  T(C): 37 (15 Dec 2017 08:42), Max: 37.1 (14 Dec 2017 11:27)  T(F): 98.6 (15 Dec 2017 08:42), Max: 98.8 (14 Dec 2017 11:27)  HR: 76 (15 Dec 2017 08:36) (76 - 100)  BP: 161/67 (15 Dec 2017 08:36) (152/70 - 194/57)  BP(mean): 107 (14 Dec 2017 21:07) (107 - 107)  RR: 16 (15 Dec 2017 08:36) (15 - 17)  SpO2: 95% (15 Dec 2017 08:36) (95% - 98%)  I&O's Summary    14 Dec 2017 07:01  -  15 Dec 2017 07:00  --------------------------------------------------------  IN: 0 mL / OUT: 950 mL / NET: -950 mL        Physical Exam:  General: AAOx3, NAD  Pulmonary:  Cardiovascular:  Abdominal:  Extremities: BL LE edema R>L  RLE edema and improving skin maceration with small superficial ulcer over medial aspect of the calf,  5x3cm ulcer with escar on the superior medial aspect of the upper shin.   Pulses: no pulses or doppler signals in the feet      LABS:                        12.7   8.7   )-----------( 309      ( 15 Dec 2017 06:33 )             39.4     12-15    141  |  100  |  9   ----------------------------<  155<H>  5.0   |  29  |  0.94    Ca    9.1      15 Dec 2017 06:33  Phos  3.6     12-15  Mg     2.2     12-15    TPro  7.7  /  Alb  4.0  /  TBili  0.3  /  DBili  x   /  AST  25  /  ALT  29  /  AlkPhos  79  12-13    PT/INR - ( 13 Dec 2017 20:11 )   PT: 12.4 sec;   INR: 1.11          PTT - ( 13 Dec 2017 20:11 )  PTT:25.5 sec    Radiology and Additional Studies:  63 yo M with RLE nonhealing ulcers and PAD  -Discussed w Dr. Gonzalez - plan to take for debridement of right lower extremity wounds tomorrow to eliminate infection with use of synthetic grafts likely next week.  -NPO for OR with plastics for wound debridement  -IVF  -IV abx- vanc/zosyn  -f/u doppler  -Plan for eventual NPWT and interval reconstruction once infection under control and lower extremity revascularized. o/n: HERMES  12/15: s/p OR for RLE debridement with plastics    Pt seen and examined at bedside. Starts pain and edema improved.     piperacillin/tazobactam IVPB.   piperacillin/tazobactam IVPB. 4.5  vancomycin  IVPB 1500  aspirin enteric coated 81  clopidogrel Tablet 75  heparin  Injectable 5000  piperacillin/tazobactam IVPB.   piperacillin/tazobactam IVPB. 4.5  vancomycin  IVPB 1500      Allergies    No Known Allergies    Intolerances        Vital Signs Last 24 Hrs  T(C): 37 (15 Dec 2017 08:42), Max: 37.1 (14 Dec 2017 11:27)  T(F): 98.6 (15 Dec 2017 08:42), Max: 98.8 (14 Dec 2017 11:27)  HR: 76 (15 Dec 2017 08:36) (76 - 100)  BP: 161/67 (15 Dec 2017 08:36) (152/70 - 194/57)  BP(mean): 107 (14 Dec 2017 21:07) (107 - 107)  RR: 16 (15 Dec 2017 08:36) (15 - 17)  SpO2: 95% (15 Dec 2017 08:36) (95% - 98%)  I&O's Summary    14 Dec 2017 07:01  -  15 Dec 2017 07:00  --------------------------------------------------------  IN: 0 mL / OUT: 950 mL / NET: -950 mL        Physical Exam:  General: AAOx3, NAD  Pulmonary: Normal work of breathing on room air   Cardiovascular: RRR  Extremities: BL LE edema R>L  RLE edema and improving skin maceration with small superficial ulcer over medial aspect of the calf, dressing clean dry and intact.    Pulses: no pulses or doppler signals in the feet      LABS:                        12.7   8.7   )-----------( 309      ( 15 Dec 2017 06:33 )             39.4     12-15    141  |  100  |  9   ----------------------------<  155<H>  5.0   |  29  |  0.94    Ca    9.1      15 Dec 2017 06:33  Phos  3.6     12-15  Mg     2.2     12-15    TPro  7.7  /  Alb  4.0  /  TBili  0.3  /  DBili  x   /  AST  25  /  ALT  29  /  AlkPhos  79  12-13    PT/INR - ( 13 Dec 2017 20:11 )   PT: 12.4 sec;   INR: 1.11          PTT - ( 13 Dec 2017 20:11 )  PTT:25.5 sec    Radiology and Additional Studies:  61 yo M with RLE nonhealing ulcers and PAD  -Discussed w Dr. Gonzalez - plan to take for debridement of right lower extremity wounds tomorrow to eliminate infection with use of synthetic grafts likely next week.  -Regular diet  -IVF  -IV abx- vanc/zosyn  -f/u doppler  -Plan for eventual NPWT and interval reconstruction once infection under control and lower extremity revascularized. o/n: HERMES  12/15: s/p OR for RLE debridement with plastics    Pt seen and examined at bedside. Starts pain and edema improved.     piperacillin/tazobactam IVPB.   piperacillin/tazobactam IVPB. 4.5  vancomycin  IVPB 1500  aspirin enteric coated 81  clopidogrel Tablet 75  heparin  Injectable 5000  piperacillin/tazobactam IVPB.   piperacillin/tazobactam IVPB. 4.5  vancomycin  IVPB 1500      Allergies    No Known Allergies    Intolerances        Vital Signs Last 24 Hrs  T(C): 37 (15 Dec 2017 08:42), Max: 37.1 (14 Dec 2017 11:27)  T(F): 98.6 (15 Dec 2017 08:42), Max: 98.8 (14 Dec 2017 11:27)  HR: 76 (15 Dec 2017 08:36) (76 - 100)  BP: 161/67 (15 Dec 2017 08:36) (152/70 - 194/57)  BP(mean): 107 (14 Dec 2017 21:07) (107 - 107)  RR: 16 (15 Dec 2017 08:36) (15 - 17)  SpO2: 95% (15 Dec 2017 08:36) (95% - 98%)  I&O's Summary    14 Dec 2017 07:01  -  15 Dec 2017 07:00  --------------------------------------------------------  IN: 0 mL / OUT: 950 mL / NET: -950 mL        Physical Exam:  General: AAOx3, NAD  Pulmonary: Normal work of breathing on room air   Cardiovascular: RRR  Extremities: BL LE edema R>L  RLE edema improving, dressing clean dry and intact.    Pulses: no pulses or doppler signals in the feet      LABS:                        12.7   8.7   )-----------( 309      ( 15 Dec 2017 06:33 )             39.4     12-15    141  |  100  |  9   ----------------------------<  155<H>  5.0   |  29  |  0.94    Ca    9.1      15 Dec 2017 06:33  Phos  3.6     12-15  Mg     2.2     12-15    TPro  7.7  /  Alb  4.0  /  TBili  0.3  /  DBili  x   /  AST  25  /  ALT  29  /  AlkPhos  79  12-13    PT/INR - ( 13 Dec 2017 20:11 )   PT: 12.4 sec;   INR: 1.11          PTT - ( 13 Dec 2017 20:11 )  PTT:25.5 sec    Radiology and Additional Studies:  61 yo M with RLE nonhealing ulcers and PAD  -Discussed w Dr. Gonzalez - plan to take for debridement of right lower extremity wounds tomorrow to eliminate infection with use of synthetic grafts likely next week.  -Regular diet  -IVF  -IV abx- vanc/zosyn  -f/u doppler  -Plan for eventual NPWT and interval reconstruction once infection under control and lower extremity revascularized.

## 2017-12-16 NOTE — PROGRESS NOTE ADULT - SUBJECTIVE AND OBJECTIVE BOX
S:63 yo M poor historian with PMHx of HTN and PVD admitted for RLE necrotic ulcers POD #1 s/p debridement of RLE non-healing wounds x 3. He is currently being worked up for bypass surgery next week and he will continue on IV antibiotics until then. Overnight, slept well except pain at 2am which improved with pain meds. His OR culture has gram positive and gram negative growth (see below) on prelim culture. He is on vanc/zosyn.    O:  Vital Signs Last 24 Hrs  T(C): 37.2 (16 Dec 2017 08:18), Max: 37.3 (15 Dec 2017 21:40)  T(F): 98.9 (16 Dec 2017 08:18), Max: 99.1 (15 Dec 2017 21:40)  HR: 71 (16 Dec 2017 08:35) (58 - 100)  BP: 132/60 (16 Dec 2017 08:35) (128/57 - 188/65)  BP(mean): 115 (15 Dec 2017 21:00) (86 - 131)  ABP: --  ABP(mean): --  RR: 18 (16 Dec 2017 08:35) (12 - 18)  SpO2: 94% (16 Dec 2017 08:35) (91% - 100%)    I&O's Detail    15 Dec 2017 07:01  -  16 Dec 2017 07:00  --------------------------------------------------------  IN:    sodium chloride 0.9%.: 150 mL  Total IN: 150 mL    OUT:    Voided: 2100 mL  Total OUT: 2100 mL    Total NET: -1950 mL    CBC Full  -  ( 16 Dec 2017 08:32 )  WBC Count : 11.8 K/uL  Hemoglobin : 12.8 g/dL  Hematocrit : 40.5 %  Platelet Count - Automated : 387 K/uL  Mean Cell Volume : 95.3 fL  Mean Cell Hemoglobin : 30.1 pg  Mean Cell Hemoglobin Concentration : 31.6 g/dL  Auto Neutrophil # : x  Auto Lymphocyte # : x  Auto Monocyte # : x  Auto Eosinophil # : x  Auto Basophil # : x  Auto Neutrophil % : x  Auto Lymphocyte % : x  Auto Monocyte % : x  Auto Eosinophil % : x  Auto Basophil % : x      Culture - Surgical Swab (collected 15 Dec 2017 20:49)  Source: .Surgical Swab right leg proximal wound  Gram Stain (15 Dec 2017 22:25):    Moderate Gram Positive Cocci in Pairs and Chains    Moderate White blood cells  Preliminary Report (16 Dec 2017 13:21):    Moderate Gram Negative Rods    Identification and susceptibility to follow.    Moderate Staphylococcus aureus    Susceptibility to follow.    Moderate Streptococcus agalactiae (Group B)    Culture in progress    Culture - Surgical Swab (collected 15 Dec 2017 20:48)  Source: .Surgical Swab right medial knee  Gram Stain (15 Dec 2017 22:18):    Few Gram positive cocci in pairs, chains and clusters    Few White blood cells  Preliminary Report (16 Dec 2017 13:31):    Moderate Gram Negative Rods    Identification and susceptibility to follow.    Moderate Staphylococcus aureus    Susceptibility to follow.    Moderate Streptococcus agalactiae (Group B)    Susceptibility to follow.    Culture in progress    Culture - Surgical Swab (collected 15 Dec 2017 20:48)  Source: .Surgical Swab right leg proximal wound 2  Gram Stain (15 Dec 2017 22:22):    Rare Gram Positive Cocci in Pairs and Chains    Rare White blood cells    Culture - Surgical Swab (collected 15 Dec 2017 20:43)  Source: .Surgical Swab rt leg patella bone bx  Gram Stain (15 Dec 2017 22:13):    Rare Gram Negative Rods    Rare Gram Positive Cocci in Pairs and Chains    No White blood cells  Preliminary Report (16 Dec 2017 10:09):    No growth to date.    Culture - Urine (collected 14 Dec 2017 09:45)  Source: .Urine Clean Catch (Midstream)  Final Report (15 Dec 2017 09:20):    No growth    Culture - Blood (collected 14 Dec 2017 01:58)  Source: .Blood Blood-Venous  Preliminary Report (16 Dec 2017 02:01):    No growth at 2 days.    Culture - Blood (collected 14 Dec 2017 01:58)  Source: .Blood Blood-Peripheral  Preliminary Report (16 Dec 2017 02:01):    No growth at 2 days.        12-16    136  |  94<L>  |  7   ----------------------------<  127<H>  4.2   |  23  |  0.86    Ca    9.5      16 Dec 2017 08:32  Phos  4.2     12-16  Mg     2.2     12-16          Physical Exam: gen- NAD  pulm- CTA b/l  cardio- s1s2 wnl  abd- soft nt/nd  ext- RLE- All three debrided wounds are clean with no malodor or purulence. Cellulitis resolving, edema and tenderness significantly improved. No sign of residual necrotic tissue.  vascular- RLE- no DP/PT/POP +biphasic fem LLE- no DP sig +mono PT/POP +biphasic fem	         Assessment and Plan:   63 yo M with RLE nonhealing ulcers and PAD and extensive RLE cellulitis POD #1 s/p debridement of three RLE non-healing ulcers.  -Appreciate intraoperative ortho consult - no concern for chronic bursitis (despite involvement of bursa) or joint.  -F/u final wound cultures  -F/u bone biopsy of patella to rule out osteomyelitis.  -Consider ID consult depending on results of micro/bone biopsy.  -OOB and ambulate. WBAT from plastic surgery perspective.  -IV abx- vanc/zosyn  -Plan for NPWT likely starting Monday (12/18/17) once infection resolves. Will undergo definitive wound reconstruction once infection under control and lower extremity revascularization completed.

## 2017-12-17 LAB
-  AMPICILLIN/SULBACTAM: SIGNIFICANT CHANGE UP
-  AMPICILLIN/SULBACTAM: SIGNIFICANT CHANGE UP
-  AMPICILLIN: SIGNIFICANT CHANGE UP
-  CEFAZOLIN: SIGNIFICANT CHANGE UP
-  CEFTRIAXONE: SIGNIFICANT CHANGE UP
-  CEFTRIAXONE: SIGNIFICANT CHANGE UP
-  CIPROFLOXACIN: SIGNIFICANT CHANGE UP
-  CIPROFLOXACIN: SIGNIFICANT CHANGE UP
-  CLINDAMYCIN: SIGNIFICANT CHANGE UP
-  ERYTHROMYCIN: SIGNIFICANT CHANGE UP
-  GENTAMICIN: SIGNIFICANT CHANGE UP
-  GENTAMICIN: SIGNIFICANT CHANGE UP
-  LINEZOLID: SIGNIFICANT CHANGE UP
-  LINEZOLID: SIGNIFICANT CHANGE UP
-  OXACILLIN: SIGNIFICANT CHANGE UP
-  OXACILLIN: SIGNIFICANT CHANGE UP
-  PENICILLIN: SIGNIFICANT CHANGE UP
-  PIPERACILLIN/TAZOBACTAM: SIGNIFICANT CHANGE UP
-  PIPERACILLIN/TAZOBACTAM: SIGNIFICANT CHANGE UP
-  RIFAMPIN: SIGNIFICANT CHANGE UP
-  RIFAMPIN: SIGNIFICANT CHANGE UP
-  TOBRAMYCIN: SIGNIFICANT CHANGE UP
-  TOBRAMYCIN: SIGNIFICANT CHANGE UP
-  TRIMETHOPRIM/SULFAMETHOXAZOLE: SIGNIFICANT CHANGE UP
-  VANCOMYCIN: SIGNIFICANT CHANGE UP
ANION GAP SERPL CALC-SCNC: 11 MMOL/L — SIGNIFICANT CHANGE UP (ref 5–17)
APTT BLD: 32.2 SEC — SIGNIFICANT CHANGE UP (ref 27.5–37.4)
BUN SERPL-MCNC: 9 MG/DL — SIGNIFICANT CHANGE UP (ref 7–23)
CALCIUM SERPL-MCNC: 9 MG/DL — SIGNIFICANT CHANGE UP (ref 8.4–10.5)
CHLORIDE SERPL-SCNC: 100 MMOL/L — SIGNIFICANT CHANGE UP (ref 96–108)
CK MB CFR SERPL CALC: 6.1 NG/ML — SIGNIFICANT CHANGE UP (ref 0–6.7)
CK SERPL-CCNC: 295 U/L — HIGH (ref 30–200)
CO2 SERPL-SCNC: 30 MMOL/L — SIGNIFICANT CHANGE UP (ref 22–31)
CREAT SERPL-MCNC: 0.92 MG/DL — SIGNIFICANT CHANGE UP (ref 0.5–1.3)
CULTURE RESULTS: SIGNIFICANT CHANGE UP
GLUCOSE SERPL-MCNC: 146 MG/DL — HIGH (ref 70–99)
HCT VFR BLD CALC: 37.7 % — LOW (ref 39–50)
HGB BLD-MCNC: 12.4 G/DL — LOW (ref 13–17)
INR BLD: 1.18 — HIGH (ref 0.88–1.16)
MAGNESIUM SERPL-MCNC: 2.3 MG/DL — SIGNIFICANT CHANGE UP (ref 1.6–2.6)
MCHC RBC-ENTMCNC: 30.7 PG — SIGNIFICANT CHANGE UP (ref 27–34)
MCHC RBC-ENTMCNC: 32.9 G/DL — SIGNIFICANT CHANGE UP (ref 32–36)
MCV RBC AUTO: 93.3 FL — SIGNIFICANT CHANGE UP (ref 80–100)
METHOD TYPE: SIGNIFICANT CHANGE UP
ORGANISM # SPEC MICROSCOPIC CNT: SIGNIFICANT CHANGE UP
PHOSPHATE SERPL-MCNC: 2.9 MG/DL — SIGNIFICANT CHANGE UP (ref 2.5–4.5)
PLATELET # BLD AUTO: 327 K/UL — SIGNIFICANT CHANGE UP (ref 150–400)
POTASSIUM SERPL-MCNC: 4.4 MMOL/L — SIGNIFICANT CHANGE UP (ref 3.5–5.3)
POTASSIUM SERPL-SCNC: 4.4 MMOL/L — SIGNIFICANT CHANGE UP (ref 3.5–5.3)
PROTHROM AB SERPL-ACNC: 13.1 SEC — HIGH (ref 9.8–12.7)
RBC # BLD: 4.04 M/UL — LOW (ref 4.2–5.8)
RBC # FLD: 13.4 % — SIGNIFICANT CHANGE UP (ref 10.3–16.9)
SODIUM SERPL-SCNC: 141 MMOL/L — SIGNIFICANT CHANGE UP (ref 135–145)
SPECIMEN SOURCE: SIGNIFICANT CHANGE UP
TROPONIN T SERPL-MCNC: <0.01 NG/ML — SIGNIFICANT CHANGE UP (ref 0–0.01)
TROPONIN T SERPL-MCNC: <0.01 NG/ML — SIGNIFICANT CHANGE UP (ref 0–0.01)
WBC # BLD: 8 K/UL — SIGNIFICANT CHANGE UP (ref 3.8–10.5)
WBC # FLD AUTO: 8 K/UL — SIGNIFICANT CHANGE UP (ref 3.8–10.5)

## 2017-12-17 PROCEDURE — 71010: CPT | Mod: 26

## 2017-12-17 PROCEDURE — 93010 ELECTROCARDIOGRAM REPORT: CPT

## 2017-12-17 RX ORDER — ADENOSINE 3 MG/ML
6 INJECTION INTRAVENOUS ONCE
Qty: 0 | Refills: 0 | Status: COMPLETED | OUTPATIENT
Start: 2017-12-17 | End: 2017-12-17

## 2017-12-17 RX ORDER — SODIUM CHLORIDE 9 MG/ML
1000 INJECTION INTRAMUSCULAR; INTRAVENOUS; SUBCUTANEOUS
Qty: 0 | Refills: 0 | Status: DISCONTINUED | OUTPATIENT
Start: 2017-12-17 | End: 2017-12-18

## 2017-12-17 RX ADMIN — OXYCODONE AND ACETAMINOPHEN 1 TABLET(S): 5; 325 TABLET ORAL at 10:57

## 2017-12-17 RX ADMIN — Medication 1 APPLICATION(S): at 12:44

## 2017-12-17 RX ADMIN — OXYCODONE AND ACETAMINOPHEN 2 TABLET(S): 5; 325 TABLET ORAL at 22:30

## 2017-12-17 RX ADMIN — HEPARIN SODIUM 5000 UNIT(S): 5000 INJECTION INTRAVENOUS; SUBCUTANEOUS at 21:24

## 2017-12-17 RX ADMIN — PIPERACILLIN AND TAZOBACTAM 200 GRAM(S): 4; .5 INJECTION, POWDER, LYOPHILIZED, FOR SOLUTION INTRAVENOUS at 06:59

## 2017-12-17 RX ADMIN — Medication 1 APPLICATION(S): at 23:34

## 2017-12-17 RX ADMIN — ADENOSINE 6 MILLIGRAM(S): 3 INJECTION INTRAVENOUS at 04:08

## 2017-12-17 RX ADMIN — OXYCODONE AND ACETAMINOPHEN 2 TABLET(S): 5; 325 TABLET ORAL at 21:25

## 2017-12-17 RX ADMIN — Medication 30 MILLILITER(S): at 00:57

## 2017-12-17 RX ADMIN — PIPERACILLIN AND TAZOBACTAM 200 GRAM(S): 4; .5 INJECTION, POWDER, LYOPHILIZED, FOR SOLUTION INTRAVENOUS at 00:57

## 2017-12-17 RX ADMIN — HEPARIN SODIUM 5000 UNIT(S): 5000 INJECTION INTRAVENOUS; SUBCUTANEOUS at 06:27

## 2017-12-17 RX ADMIN — HEPARIN SODIUM 5000 UNIT(S): 5000 INJECTION INTRAVENOUS; SUBCUTANEOUS at 14:00

## 2017-12-17 RX ADMIN — Medication 300 MILLIGRAM(S): at 10:57

## 2017-12-17 RX ADMIN — HYDROMORPHONE HYDROCHLORIDE 1 MILLIGRAM(S): 2 INJECTION INTRAMUSCULAR; INTRAVENOUS; SUBCUTANEOUS at 13:42

## 2017-12-17 RX ADMIN — CLOPIDOGREL BISULFATE 75 MILLIGRAM(S): 75 TABLET, FILM COATED ORAL at 15:28

## 2017-12-17 RX ADMIN — PIPERACILLIN AND TAZOBACTAM 200 GRAM(S): 4; .5 INJECTION, POWDER, LYOPHILIZED, FOR SOLUTION INTRAVENOUS at 23:33

## 2017-12-17 RX ADMIN — PIPERACILLIN AND TAZOBACTAM 200 GRAM(S): 4; .5 INJECTION, POWDER, LYOPHILIZED, FOR SOLUTION INTRAVENOUS at 15:33

## 2017-12-17 RX ADMIN — Medication 81 MILLIGRAM(S): at 15:29

## 2017-12-17 RX ADMIN — Medication 1 APPLICATION(S): at 15:57

## 2017-12-17 RX ADMIN — SODIUM CHLORIDE 100 MILLILITER(S): 9 INJECTION INTRAMUSCULAR; INTRAVENOUS; SUBCUTANEOUS at 04:35

## 2017-12-17 RX ADMIN — Medication 1 APPLICATION(S): at 00:15

## 2017-12-17 NOTE — PROVIDER CONTACT NOTE (CHANGE IN STATUS NOTIFICATION) - ACTION/TREATMENT ORDERED:
As per MD Abdi, perform an EKG at bedside.  Continue to monitor patient and re-evaluate. As per MD Abdi, perform EKG at bedside. At 0408, Adenosine 6 mg IV given by MD Abdi at bedside, pt converted back to NSR, underlying rhythm ST. /77, HR 80s. Will closely monitor and re-evaluate.

## 2017-12-17 NOTE — PROGRESS NOTE ADULT - SUBJECTIVE AND OBJECTIVE BOX
ON: New onset tachy arrythmia, Asymptomatic, 12-lead obtained narrow complex tachycardia 6mg adenosine pushed with continuos 12-lead monitoring. Converted to NSR @90. Cardiac enzymes negative    12/16: Passed TOV. HERMES Dressing changed.           61 yo M with RLE nonhealing ulcers and PAD, chronic infra renal aortic occulsion     -Pain/nausea PRN  -Regular diet  -Possible OR tuesday  -IV abx- vanc/zosyn  -BID WTD with Dakins  -Plan for eventual NPWT and interval reconstruction with plastics once infection under control and lower extremity revascularized. ON: New onset tachy arrythmia, Asymptomatic, 12-lead obtained narrow complex tachycardia 6mg adenosine pushed with continuos 12-lead monitoring. Converted to NSR @90. Cardiac enzymes negative    12/16: Passed TOV. HERMES Dressing changed.       Medication:   piperacillin/tazobactam IVPB.   piperacillin/tazobactam IVPB. 4.5  vancomycin  IVPB 1500  aspirin enteric coated 81  clopidogrel Tablet 75  heparin  Injectable 5000  piperacillin/tazobactam IVPB.   piperacillin/tazobactam IVPB. 4.5  vancomycin  IVPB 1500        Vital Signs Last 24 Hrs  T(C): 36.9 (17 Dec 2017 08:44), Max: 37.6 (16 Dec 2017 21:35)  T(F): 98.4 (17 Dec 2017 08:44), Max: 99.7 (16 Dec 2017 21:35)  HR: 98 (17 Dec 2017 08:30) (72 - 130)  BP: 148/63 (17 Dec 2017 08:30) (127/58 - 171/77)  BP(mean): --  RR: 18 (17 Dec 2017 08:30) (16 - 18)  SpO2: 96% (17 Dec 2017 05:27) (96% - 97%)  I&O's Summary    16 Dec 2017 07:01  -  17 Dec 2017 07:00  --------------------------------------------------------  IN: 360 mL / OUT: 1200 mL / NET: -840 mL    17 Dec 2017 07:01  -  17 Dec 2017 10:13  --------------------------------------------------------  IN: 360 mL / OUT: 0 mL / NET: 360 mL        Physical Exam:  General: AAOx3, NAD  Pulmonary: Normal work of breathing on room air   Cardiovascular: RRR  Extremities: BL LE edema R>L  RLE edema improving, dressing clean dry and intact.    Pulses: no pulses or doppler signals in the feet      LABS:                        12.4   8.0   )-----------( 327      ( 17 Dec 2017 04:18 )             37.7     12-17    141  |  100  |  9   ----------------------------<  146<H>  4.4   |  30  |  0.92    Ca    9.0      17 Dec 2017 04:18  Phos  4.2     12-16  Mg     2.3     12-17      PT/INR - ( 17 Dec 2017 04:20 )   PT: 13.1 sec;   INR: 1.18              Radiology and Additional Studies:            63 yo M with RLE nonhealing ulcers and PAD, chronic infra renal aortic occlusion     -Pain/nausea PRN  -Regular diet  -Possible OR tuesday  -IV abx- vanc/zosyn  -BID WTD with Dakins  -Plan for eventual NPWT and interval reconstruction with plastics once infection under control and lower extremity revascularized.

## 2017-12-17 NOTE — CHART NOTE - NSCHARTNOTEFT_GEN_A_CORE
Called to bedside for new tachy arrythmia. Pt seated in bed. States was sleeping and was awoke by nursing, mild discomfort to leg but improved from yesterday. Denies CP, SOB, N/V.     P 130, R 16, /87 SPO2 93% on RA  General: A/O x4 NAD  CV Tachy to 130, rrr  Resp: normal work of breathing on RA  ABD: obese soft non-tender  Extremities: distal pulses intact UE BL WWP    62 M HTN, HLD chronic infrarenal aortic occlusion with new onset tachycardia.    -STAT EKG narrow complex tachycardia, appears regular  -O2 @ 2lpm   -IVF @ 100cc/hr  -STAT CBC BMP Cardiac enzymes  -Adenosine 6mg rapid IVP w/ continuos 12-lead monitoring, showed underlying sinus rhythm    Post intervention:   Now in NSR @ 90 /90 SPO2 97%  -f/u cardiac enzymes  -continue continuos telemetry  -F/U Dr Whatley   -rest of care as previous    -Chief resident notified Called to bedside for new tachy arrythmia. Pt seated in bed. States was sleeping and was awoke by nursing, mild discomfort to leg but improved from yesterday. Denies CP, SOB, N/V.     P 130, R 16, /87 SPO2 93% on RA  General: A/O x4 NAD  CV Tachy to 130, appears regular  Resp: normal work of breathing on RA  ABD: obese soft non-tender  Extremities: distal pulses intact UE BL WWP    62 M HTN, HLD chronic infrarenal aortic occlusion with new onset tachycardia.    -STAT EKG narrow complex tachycardia, appears regular  -O2 @ 2lpm   -IVF @ 100cc/hr  -STAT CBC BMP Cardiac enzymes  -Adenosine 6mg rapid IVP w/ continuos 12-lead monitoring, showed underlying sinus rhythm    Post intervention:   Now in NSR @ 90 /90 SPO2 97%  -f/u cardiac enzymes  -continue continuos telemetry  -F/U Dr Whatley   -rest of care as previous    -Chief resident notified

## 2017-12-17 NOTE — PROGRESS NOTE ADULT - ATTENDING COMMENTS
63 yo M with RLE nonhealing ulcers and PAD and extensive RLE cellulitis POD #2 s/p debridement of three RLE non-healing ulcers.  -Plan for NPWT starting Monday (12/18/17) once infection resolves. Will undergo definitive wound reconstruction once infection under control and lower extremity revascularization completed.

## 2017-12-17 NOTE — PROGRESS NOTE ADULT - ASSESSMENT
63 yo M with RLE nonhealing ulcers and PAD and extensive RLE cellulitis POD #2 s/p debridement of three RLE non-healing ulcers.  -Appreciate intraoperative ortho consult - no concern for chronic bursitis (despite involvement of bursa) or joint.  -F/u final wound cultures  -F/u bone biopsy of patella to rule out osteomyelitis.  -Consider ID consult depending on results of micro/bone biopsy.  -OOB and ambulate. WBAT from plastic surgery perspective.  -IV abx- vanc/zosyn  -Plan for NPWT likely starting Monday (12/18/17) once infection resolves. Will undergo definitive wound reconstruction once infection under control and lower extremity revascularization completed.

## 2017-12-17 NOTE — PROGRESS NOTE ADULT - SUBJECTIVE AND OBJECTIVE BOX
SUBJECTIVE:  O/n narrow complex tachycardia, converted w/ 6mg adenosine    OBJECTIVE:     ** VITAL SIGNS / I&O's **    T(C): 36.9 (12-17-17 @ 08:44), Max: 37.6 (12-16-17 @ 21:35)  T(F): 98.4 (12-17-17 @ 08:44), Max: 99.7 (12-16-17 @ 21:35)  HR: 98 (12-17-17 @ 08:30) (72 - 130)  BP: 148/63 (12-17-17 @ 08:30) (127/58 - 171/77)  RR: 18 (12-17-17 @ 08:30) (16 - 18)  SpO2: 96% (12-17-17 @ 05:27) (96% - 97%)      16 Dec 2017 07:01  -  17 Dec 2017 07:00  --------------------------------------------------------  IN:    Oral Fluid: 360 mL  Total IN: 360 mL    OUT:    Voided: 1200 mL  Total OUT: 1200 mL    Total NET: -840 mL      17 Dec 2017 07:01  -  17 Dec 2017 10:27  --------------------------------------------------------  IN:    Oral Fluid: 360 mL  Total IN: 360 mL    OUT:  Total OUT: 0 mL    Total NET: 360 mL          ** PHYSICAL EXAM **      ** LABS **                 12.4   8.0    )----------(  327       ( 17 Dec 2017 04:18 )               37.7      141    |  100    |  9      ----------------------------<  146        ( 17 Dec 2017 04:18 )  4.4     |  30     |  0.92     Ca    9.0        ( 17 Dec 2017 04:18 )  Mg     2.3       ( 17 Dec 2017 04:18 )      PT/INR -  13.1 sec / 1.18    ( 17 Dec 2017 04:20 )         CAPILLARY BLOOD GLUCOSE    CARDIAC MARKERS ( 17 Dec 2017 04:18 )  x     / <0.01 ng/mL / 295 U/L / x     / 6.1 ng/mL SUBJECTIVE:  O/n narrow complex tachycardia, converted w/ 6mg adenosine    OBJECTIVE:     ** VITAL SIGNS / I&O's **    T(C): 36.9 (12-17-17 @ 08:44), Max: 37.6 (12-16-17 @ 21:35)  T(F): 98.4 (12-17-17 @ 08:44), Max: 99.7 (12-16-17 @ 21:35)  HR: 98 (12-17-17 @ 08:30) (72 - 130)  BP: 148/63 (12-17-17 @ 08:30) (127/58 - 171/77)  RR: 18 (12-17-17 @ 08:30) (16 - 18)  SpO2: 96% (12-17-17 @ 05:27) (96% - 97%)      16 Dec 2017 07:01  -  17 Dec 2017 07:00  --------------------------------------------------------  IN:    Oral Fluid: 360 mL  Total IN: 360 mL    OUT:    Voided: 1200 mL  Total OUT: 1200 mL    Total NET: -840 mL      17 Dec 2017 07:01  -  17 Dec 2017 10:27  --------------------------------------------------------  IN:    Oral Fluid: 360 mL  Total IN: 360 mL    OUT:  Total OUT: 0 mL    Total NET: 360 mL          ** PHYSICAL EXAM **  All three debrided wounds are clean with no malodor or purulence. Cellulitis resolving, edema and tenderness significantly improved.    ** LABS **                 12.4   8.0    )----------(  327       ( 17 Dec 2017 04:18 )               37.7      141    |  100    |  9      ----------------------------<  146        ( 17 Dec 2017 04:18 )  4.4     |  30     |  0.92     Ca    9.0        ( 17 Dec 2017 04:18 )  Mg     2.3       ( 17 Dec 2017 04:18 )      PT/INR -  13.1 sec / 1.18    ( 17 Dec 2017 04:20 )         CAPILLARY BLOOD GLUCOSE    CARDIAC MARKERS ( 17 Dec 2017 04:18 )  x     / <0.01 ng/mL / 295 U/L / x     / 6.1 ng/mL

## 2017-12-18 LAB
ANION GAP SERPL CALC-SCNC: 13 MMOL/L — SIGNIFICANT CHANGE UP (ref 5–17)
APTT BLD: 34.4 SEC — SIGNIFICANT CHANGE UP (ref 27.5–37.4)
BLD GP AB SCN SERPL QL: NEGATIVE — SIGNIFICANT CHANGE UP
BUN SERPL-MCNC: 8 MG/DL — SIGNIFICANT CHANGE UP (ref 7–23)
CALCIUM SERPL-MCNC: 9.1 MG/DL — SIGNIFICANT CHANGE UP (ref 8.4–10.5)
CHLORIDE SERPL-SCNC: 99 MMOL/L — SIGNIFICANT CHANGE UP (ref 96–108)
CO2 SERPL-SCNC: 28 MMOL/L — SIGNIFICANT CHANGE UP (ref 22–31)
CREAT SERPL-MCNC: 0.9 MG/DL — SIGNIFICANT CHANGE UP (ref 0.5–1.3)
GLUCOSE SERPL-MCNC: 136 MG/DL — HIGH (ref 70–99)
HCT VFR BLD CALC: 38 % — LOW (ref 39–50)
HGB BLD-MCNC: 12.2 G/DL — LOW (ref 13–17)
INR BLD: 1.19 — HIGH (ref 0.88–1.16)
MAGNESIUM SERPL-MCNC: 2.2 MG/DL — SIGNIFICANT CHANGE UP (ref 1.6–2.6)
MCHC RBC-ENTMCNC: 30.1 PG — SIGNIFICANT CHANGE UP (ref 27–34)
MCHC RBC-ENTMCNC: 32.1 G/DL — SIGNIFICANT CHANGE UP (ref 32–36)
MCV RBC AUTO: 93.8 FL — SIGNIFICANT CHANGE UP (ref 80–100)
PHOSPHATE SERPL-MCNC: 3.3 MG/DL — SIGNIFICANT CHANGE UP (ref 2.5–4.5)
PLATELET # BLD AUTO: 361 K/UL — SIGNIFICANT CHANGE UP (ref 150–400)
POTASSIUM SERPL-MCNC: 4.3 MMOL/L — SIGNIFICANT CHANGE UP (ref 3.5–5.3)
POTASSIUM SERPL-SCNC: 4.3 MMOL/L — SIGNIFICANT CHANGE UP (ref 3.5–5.3)
PROTHROM AB SERPL-ACNC: 13.2 SEC — HIGH (ref 9.8–12.7)
RBC # BLD: 4.05 M/UL — LOW (ref 4.2–5.8)
RBC # FLD: 13.4 % — SIGNIFICANT CHANGE UP (ref 10.3–16.9)
RH IG SCN BLD-IMP: POSITIVE — SIGNIFICANT CHANGE UP
SODIUM SERPL-SCNC: 140 MMOL/L — SIGNIFICANT CHANGE UP (ref 135–145)
VANCOMYCIN TROUGH SERPL-MCNC: 19.1 UG/ML — SIGNIFICANT CHANGE UP (ref 10–20)
WBC # BLD: 8.4 K/UL — SIGNIFICANT CHANGE UP (ref 3.8–10.5)
WBC # FLD AUTO: 8.4 K/UL — SIGNIFICANT CHANGE UP (ref 3.8–10.5)

## 2017-12-18 PROCEDURE — 99233 SBSQ HOSP IP/OBS HIGH 50: CPT

## 2017-12-18 RX ORDER — SODIUM CHLORIDE 9 MG/ML
1000 INJECTION INTRAMUSCULAR; INTRAVENOUS; SUBCUTANEOUS
Qty: 0 | Refills: 0 | Status: DISCONTINUED | OUTPATIENT
Start: 2017-12-19 | End: 2017-12-19

## 2017-12-18 RX ORDER — HYDROMORPHONE HYDROCHLORIDE 2 MG/ML
1 INJECTION INTRAMUSCULAR; INTRAVENOUS; SUBCUTANEOUS ONCE
Qty: 0 | Refills: 0 | Status: DISCONTINUED | OUTPATIENT
Start: 2017-12-18 | End: 2017-12-18

## 2017-12-18 RX ORDER — METOPROLOL TARTRATE 50 MG
12.5 TABLET ORAL
Qty: 0 | Refills: 0 | Status: DISCONTINUED | OUTPATIENT
Start: 2017-12-18 | End: 2017-12-19

## 2017-12-18 RX ADMIN — PIPERACILLIN AND TAZOBACTAM 200 GRAM(S): 4; .5 INJECTION, POWDER, LYOPHILIZED, FOR SOLUTION INTRAVENOUS at 17:49

## 2017-12-18 RX ADMIN — OXYCODONE AND ACETAMINOPHEN 2 TABLET(S): 5; 325 TABLET ORAL at 18:50

## 2017-12-18 RX ADMIN — Medication 1 APPLICATION(S): at 22:55

## 2017-12-18 RX ADMIN — HYDROMORPHONE HYDROCHLORIDE 1 MILLIGRAM(S): 2 INJECTION INTRAMUSCULAR; INTRAVENOUS; SUBCUTANEOUS at 23:48

## 2017-12-18 RX ADMIN — Medication 81 MILLIGRAM(S): at 12:03

## 2017-12-18 RX ADMIN — HYDROMORPHONE HYDROCHLORIDE 0.5 MILLIGRAM(S): 2 INJECTION INTRAMUSCULAR; INTRAVENOUS; SUBCUTANEOUS at 14:45

## 2017-12-18 RX ADMIN — HYDROMORPHONE HYDROCHLORIDE 1 MILLIGRAM(S): 2 INJECTION INTRAMUSCULAR; INTRAVENOUS; SUBCUTANEOUS at 22:48

## 2017-12-18 RX ADMIN — Medication 1 APPLICATION(S): at 12:24

## 2017-12-18 RX ADMIN — Medication 12.5 MILLIGRAM(S): at 17:47

## 2017-12-18 RX ADMIN — OXYCODONE AND ACETAMINOPHEN 2 TABLET(S): 5; 325 TABLET ORAL at 17:48

## 2017-12-18 RX ADMIN — Medication 300 MILLIGRAM(S): at 00:05

## 2017-12-18 RX ADMIN — Medication 1 APPLICATION(S): at 12:26

## 2017-12-18 RX ADMIN — HYDROMORPHONE HYDROCHLORIDE 1 MILLIGRAM(S): 2 INJECTION INTRAMUSCULAR; INTRAVENOUS; SUBCUTANEOUS at 01:00

## 2017-12-18 RX ADMIN — HYDROMORPHONE HYDROCHLORIDE 1 MILLIGRAM(S): 2 INJECTION INTRAMUSCULAR; INTRAVENOUS; SUBCUTANEOUS at 12:03

## 2017-12-18 RX ADMIN — HYDROMORPHONE HYDROCHLORIDE 0.5 MILLIGRAM(S): 2 INJECTION INTRAMUSCULAR; INTRAVENOUS; SUBCUTANEOUS at 14:23

## 2017-12-18 RX ADMIN — OXYCODONE AND ACETAMINOPHEN 2 TABLET(S): 5; 325 TABLET ORAL at 06:57

## 2017-12-18 RX ADMIN — Medication 300 MILLIGRAM(S): at 15:02

## 2017-12-18 RX ADMIN — OXYCODONE AND ACETAMINOPHEN 2 TABLET(S): 5; 325 TABLET ORAL at 22:43

## 2017-12-18 RX ADMIN — HYDROMORPHONE HYDROCHLORIDE 1 MILLIGRAM(S): 2 INJECTION INTRAMUSCULAR; INTRAVENOUS; SUBCUTANEOUS at 00:36

## 2017-12-18 RX ADMIN — SODIUM CHLORIDE 100 MILLILITER(S): 9 INJECTION INTRAMUSCULAR; INTRAVENOUS; SUBCUTANEOUS at 12:02

## 2017-12-18 RX ADMIN — HEPARIN SODIUM 5000 UNIT(S): 5000 INJECTION INTRAVENOUS; SUBCUTANEOUS at 13:32

## 2017-12-18 RX ADMIN — HEPARIN SODIUM 5000 UNIT(S): 5000 INJECTION INTRAVENOUS; SUBCUTANEOUS at 21:43

## 2017-12-18 RX ADMIN — HEPARIN SODIUM 5000 UNIT(S): 5000 INJECTION INTRAVENOUS; SUBCUTANEOUS at 05:19

## 2017-12-18 RX ADMIN — OXYCODONE AND ACETAMINOPHEN 2 TABLET(S): 5; 325 TABLET ORAL at 21:43

## 2017-12-18 RX ADMIN — HYDROMORPHONE HYDROCHLORIDE 1 MILLIGRAM(S): 2 INJECTION INTRAMUSCULAR; INTRAVENOUS; SUBCUTANEOUS at 12:20

## 2017-12-18 RX ADMIN — PIPERACILLIN AND TAZOBACTAM 200 GRAM(S): 4; .5 INJECTION, POWDER, LYOPHILIZED, FOR SOLUTION INTRAVENOUS at 22:54

## 2017-12-18 RX ADMIN — PIPERACILLIN AND TAZOBACTAM 200 GRAM(S): 4; .5 INJECTION, POWDER, LYOPHILIZED, FOR SOLUTION INTRAVENOUS at 05:18

## 2017-12-18 RX ADMIN — Medication 300 MILLIGRAM(S): at 22:54

## 2017-12-18 RX ADMIN — CLOPIDOGREL BISULFATE 75 MILLIGRAM(S): 75 TABLET, FILM COATED ORAL at 12:03

## 2017-12-18 NOTE — PROGRESS NOTE ADULT - SUBJECTIVE AND OBJECTIVE BOX
SUBJECTIVE:  No acute events o/n. Deferred dressing change for later in the day.    OBJECTIVE:   Vital Signs Last 24 Hrs  T(C): 36.3 (12-18-17 @ 05:56), Max: 37 (12-17-17 @ 23:56)  T(F): 97.3 (12-18-17 @ 05:56), Max: 98.6 (12-17-17 @ 23:56)  HR: 73 (12-18-17 @ 04:36) (73 - 98)  BP: 171/80 (12-18-17 @ 04:36) (138/67 - 173/79)  BP(mean): --  RR: 18 (12-18-17 @ 04:36) (18 - 18)  SpO2: 96% (12-18-17 @ 04:36) (96% - 96%)  I&O's Detail    17 Dec 2017 07:01  -  18 Dec 2017 07:00  --------------------------------------------------------  IN:    IV PiggyBack: 600 mL    Oral Fluid: 360 mL    sodium chloride 0.9%.: 1100 mL  Total IN: 2060 mL    OUT:    Voided: 3140 mL  Total OUT: 3140 mL    Total NET: -1080 mL              ** PHYSICAL EXAM **  Dressing c/d/i, exam otherwise stable

## 2017-12-18 NOTE — PROGRESS NOTE ADULT - ATTENDING COMMENTS
61 yo M with RLE nonhealing ulcers and PAD and extensive RLE cellulitis POD3 s/p debridement of three RLE non-healing ulcers.  -NPWT changes Q2-3 days. Next change will be 12/20.  -Plan to initiate veraflow  -Will need additional debridement/reconstruction once stable from vascular perspective.

## 2017-12-18 NOTE — PROGRESS NOTE ADULT - ASSESSMENT
63 yo M with RLE nonhealing ulcers and PAD and extensive RLE cellulitis POD3 s/p debridement of three RLE non-healing ulcers.  -Appreciate intraoperative ortho consult - no concern for chronic bursitis (despite involvement of bursa) or joint.  -F/u final wound cultures--still NGTD  -F/u bone biopsy of patella to rule out osteomyelitis.  -Consider ID consult depending on results of micro/bone biopsy.  -OOB and ambulate. WBAT from plastic surgery perspective.  -IV abx- vanc/zosyn  -Plan for NPWT with Veraflo likely starting Monday (12/18/17) once infection resolves. Will undergo definitive wound reconstruction once infection under control and lower extremity revascularization completed.  -Care per primary

## 2017-12-18 NOTE — PROGRESS NOTE ADULT - SUBJECTIVE AND OBJECTIVE BOX
O/N: vanco trough 19.1 - dose given, no tachycardia overnight  12/17: EKG: probable junctional tachycardia and ST abnormality; repeat trop negative; bone cx: rare enterobacter, staph aureus, group B strep; restarted on home amlodipine; called Laura; fellow will see tonight        61 yo M with RLE nonhealing ulcers and PAD, chronic infra renal aortic occulsion     -Pain/nausea PRN  -Regular diet  -Possible OR tuesday  -IV abx- vanc/zosyn  -BID WTD with Dakins  -f/u Cards recs  -Plan for eventual NPWT and interval reconstruction with plastics once infection under control and lower extremity revascularized.

## 2017-12-18 NOTE — DIETITIAN INITIAL EVALUATION ADULT. - OTHER INFO
61yo male presents with non-healing ulcers and PAD. Dx: peripheral vascular disease, leg pain. PMH: HTN, cellulitis. Diet order: DASH/TLC. Pt reported good appetite with PO intake ~65%. At home, stated he follows a very healthy diet (wild caught salmon, vegetables). No recent wt change. No N/V, reported diarrhea secondary to IV fluids. No chewing or swallowing difficulties. PTA pt took fish oil and tumeric pills. No reported food allergies. Skin: +3 edema L leg, ankle, and foot, R ankle, R leg non-healing ulcers (s/p debridement), emily 20. NPWT to start 12/18. Provided diet education on DASH/TLC diet. Pt appeared to be knowledgeable regarding heart healthy diet, however stated that he uses "healthy" [place] salt that is high in minerals. Explained that this is still a source of sodium that he should be conscious of. Left pt with diet education handout. 63yo male presents with non-healing ulcers and PAD. Dx: peripheral vascular disease, leg pain. PMH: HTN, cellulitis. Diet order: DASH/TLC. Pt reported good appetite with PO intake ~65%. At home, stated he follows a very healthy diet (wild caught salmon, vegetables). No recent wt change. No N/V, reported diarrhea secondary to IV fluids. No chewing or swallowing difficulties. PTA pt took fish oil and tumeric pills. No reported food allergies. Skin: +3 edema L leg, ankle, and foot, R ankle, R leg non-healing ulcers (s/p debridement, POD 3), emily 20. Negative pressure wound therapy to start 12/18. Provided diet education on DASH/TLC diet. Pt appeared to be knowledgeable regarding heart healthy diet, however stated that he uses "healthy" [place] salt that is high in minerals. Explained that this is still a source of sodium that he should be conscious of. Left pt with diet education handout. 61yo male presents with non-healing ulcers and PAD. Dx: peripheral vascular disease, leg pain. PMH: HTN, cellulitis. Pt reported good appetite with PO intake ~65%. At home, stated he follows a very healthy diet (wild caught salmon, vegetables). No recent wt change. No N/V, reported diarrhea secondary to IV fluids. No chewing or swallowing difficulties. PTA pt took fish oil and tumeric pills. No reported food allergies. Skin: +3 edema L leg, ankle, and foot, R ankle, R leg non-healing ulcers (s/p debridement, POD 3), meily 20. Negative pressure wound therapy to start 12/18. Provided diet education on DASH/TLC diet. Pt appeared to be knowledgeable regarding heart healthy diet, however stated that he uses "healthy" [place] salt that is high in minerals. Explained that this is still a source of sodium that he should be conscious of. Left pt with diet education handout.

## 2017-12-18 NOTE — PROGRESS NOTE ADULT - SUBJECTIVE AND OBJECTIVE BOX
Pre-op Diagnosis: PAD  Procedure: Aorto-bifemoral aneurysm  Surgeon: Dr. Gonzalez    Consent in chart                          12.2   8.4   )-----------( 361      ( 18 Dec 2017 07:47 )             38.0     12-18    140  |  99  |  8   ----------------------------<  136<H>  4.3   |  28  |  0.90    Ca    9.1      18 Dec 2017 07:47  Phos  3.3     12-18  Mg     2.2     12-18      PT/INR - ( 18 Dec 2017 07:47 )   PT: 13.2 sec;   INR: 1.19     PTT - ( 18 Dec 2017 07:47 )  PTT:34.4 sec    Type & Screen: O+ Ab Neg  CXR: Clear lungs. Possible trace right effusion. No left effusion.   EKG: probable junctional tachycardia; Nonspecific ST abnormality    A/P: 62yMale planned for above procedure  1. NPO past midnight, except medications  2. NS@100ml/hr  3. [x ] Blood on hold, Units: 4units PRBC, 2units FFP, 2units Platelets  4. Home medication  5. Vancomycin/Zosyn  6. F/u AM labs

## 2017-12-18 NOTE — DIETITIAN INITIAL EVALUATION ADULT. - ENERGY NEEDS
Ht: 75", Wt: 239.5#, BMI: 30, IBW: 196#, %IBW: 122% (used IBW for calculations because %IBW>120%), increased needs secondary to 3 non-healing RLE ulcers. Ht: 75", Wt: 239.5#, BMI: 30, IBW: 196#, %IBW: 122% (used IBW for calculations because %IBW>120%), increased protein needs secondary to 3 non-healing RLE ulcers.

## 2017-12-18 NOTE — DIETITIAN INITIAL EVALUATION ADULT. - NS AS NUTRI INTERV MEDICAL AND FOOD SUPPLEMENTS
Commercial beverage/Ensure enlive BID (700 kcals, 40g pro) Modified food/prostat BID (200 kcals, 30g pro)

## 2017-12-18 NOTE — PROGRESS NOTE ADULT - SUBJECTIVE AND OBJECTIVE BOX
Chief Complaint/Reason for Consult: periop cv mgmt  INTERVAL HPI: weekend events noted for brief episode of svt  	  MEDICATIONS:    piperacillin/tazobactam IVPB.      piperacillin/tazobactam IVPB. 4.5 Gram(s) IV Intermittent every 6 hours  vancomycin  IVPB 1500 milliGRAM(s) IV Intermittent every 12 hours      acetaminophen   Tablet. 325 milliGRAM(s) Oral every 4 hours PRN  HYDROmorphone  Injectable 1 milliGRAM(s) IV Push two times a day PRN  HYDROmorphone  Injectable 0.5 milliGRAM(s) IV Push every 10 minutes PRN  oxyCODONE    5 mG/acetaminophen 325 mG 2 Tablet(s) Oral every 4 hours PRN  oxyCODONE    5 mG/acetaminophen 325 mG 1 Tablet(s) Oral every 4 hours PRN        aspirin enteric coated 81 milliGRAM(s) Oral daily  clopidogrel Tablet 75 milliGRAM(s) Oral daily  Dakins Solution - 1/4 Strength 1 Application(s) Topical two times a day  Dakins Solution - Full Strength 1 Application(s) Topical once  heparin  Injectable 5000 Unit(s) SubCutaneous every 8 hours  povidone iodine 10% Solution 1 Application(s) Topical daily  sodium chloride 0.9%. 1000 milliLiter(s) IV Continuous <Continuous>      REVIEW OF SYSTEMS:  [x] As per HPI  CONSTITUTIONAL: No fever, weight loss, or fatigue  RESPIRATORY: No cough, wheezing, chills or hemoptysis; No Shortness of Breath  CARDIOVASCULAR: No chest pain, palpitations, dizziness, or leg swelling  GASTROINTESTINAL: No abdominal or epigastric pain. No nausea, vomiting, or hematemesis; No diarrhea or constipation. No melena or hematochezia.  MUSCULOSKELETAL: No joint pain or swelling; No muscle, back, or extremity pain  [x] All others negative	  [ ] Unable to obtain    PHYSICAL EXAM:  T(C): 36.3 (12-18-17 @ 05:56), Max: 37 (12-17-17 @ 23:56)  HR: 73 (12-18-17 @ 04:36) (73 - 93)  BP: 171/80 (12-18-17 @ 04:36) (138/67 - 173/79)  RR: 18 (12-18-17 @ 04:36) (18 - 18)  SpO2: 96% (12-18-17 @ 04:36) (96% - 96%)  Wt(kg): --  I&O's Summary    17 Dec 2017 07:01  -  18 Dec 2017 07:00  --------------------------------------------------------  IN: 2160 mL / OUT: 3620 mL / NET: -1460 mL          Appearance: Normal	  HEENT:   Normal oral mucosa  Cardiovascular: Normal S1 S2, No JVD, No murmurs, No edema  Respiratory: Lungs clear to auscultation	  Gastrointestinal:  Soft, Non-tender, + BS	  Extremities: Normal range of motion, No clubbing, cyanosis or edema  Vascular: Peripheral pulses palpable 2+ bilaterally    TELEMETRY: 	    ECG:   	  RADIOLOGY:   CXR:  CT:  US:    CARDIAC TESTING:  Echocardiogram:  Catheterization:  Stress Test:      LABS:	 	    CARDIAC MARKERS:                                  12.2   8.4   )-----------( 361      ( 18 Dec 2017 07:47 )             38.0     12-18    140  |  99  |  8   ----------------------------<  136<H>  4.3   |  28  |  0.90    Ca    9.1      18 Dec 2017 07:47  Phos  3.3     12-18  Mg     2.2     12-18      proBNP:   Lipid Profile:   HgA1c:   TSH:     ASSESSMENT/PLAN: 	    # SVT - Atach vs avnt, ep to evaluate, recommend starting Lopressor 12.5mg bid.    #Periop CV mgmt  RCRI/Miranda Intermediate  Normal Echo  Normal Stress test  no chest pain able to lay flat  Start lopressor 12.5bid  Cardiac optimized for surgery    #CAD - no chest pain continue asa/plavix per vascular    #HTN - pain control, start lopressor 12.5bid, consider norvasc 5mg if SBP>160 sustained    #CV Prevention  q3mo Fasting Lipid Profile, Goal LDL<100, statin as tolerated  q6week TSH  q3mo 25-OH Vitamin D Level, Goal 50, supplement as tolerated

## 2017-12-19 ENCOUNTER — RESULT REVIEW (OUTPATIENT)
Age: 62
End: 2017-12-19

## 2017-12-19 LAB
ALBUMIN SERPL ELPH-MCNC: 2.2 G/DL — LOW (ref 3.3–5)
ALBUMIN SERPL ELPH-MCNC: 2.4 G/DL — LOW (ref 3.3–5)
ALP SERPL-CCNC: 51 U/L — SIGNIFICANT CHANGE UP (ref 40–120)
ALP SERPL-CCNC: 65 U/L — SIGNIFICANT CHANGE UP (ref 40–120)
ALT FLD-CCNC: 61 U/L — HIGH (ref 10–45)
ALT FLD-CCNC: 72 U/L — HIGH (ref 10–45)
ANION GAP SERPL CALC-SCNC: 10 MMOL/L — SIGNIFICANT CHANGE UP (ref 5–17)
ANION GAP SERPL CALC-SCNC: 11 MMOL/L — SIGNIFICANT CHANGE UP (ref 5–17)
ANION GAP SERPL CALC-SCNC: 14 MMOL/L — SIGNIFICANT CHANGE UP (ref 5–17)
APTT BLD: 30.8 SEC — SIGNIFICANT CHANGE UP (ref 27.5–37.4)
AST SERPL-CCNC: 40 U/L — SIGNIFICANT CHANGE UP (ref 10–40)
AST SERPL-CCNC: 54 U/L — HIGH (ref 10–40)
BASE EXCESS BLDA CALC-SCNC: -4.5 MMOL/L — LOW (ref -2–3)
BASE EXCESS BLDA CALC-SCNC: -5 MMOL/L — LOW (ref -2–3)
BASE EXCESS BLDA CALC-SCNC: 0.2 MMOL/L — SIGNIFICANT CHANGE UP (ref -2–3)
BILIRUB SERPL-MCNC: 0.8 MG/DL — SIGNIFICANT CHANGE UP (ref 0.2–1.2)
BILIRUB SERPL-MCNC: 1.4 MG/DL — HIGH (ref 0.2–1.2)
BUN SERPL-MCNC: 11 MG/DL — SIGNIFICANT CHANGE UP (ref 7–23)
BUN SERPL-MCNC: 11 MG/DL — SIGNIFICANT CHANGE UP (ref 7–23)
BUN SERPL-MCNC: 9 MG/DL — SIGNIFICANT CHANGE UP (ref 7–23)
CA-I BLDA-SCNC: 0.95 MMOL/L — LOW (ref 1.12–1.3)
CA-I BLDA-SCNC: 0.99 MMOL/L — LOW (ref 1.12–1.3)
CA-I BLDA-SCNC: 1.01 MMOL/L — LOW (ref 1.12–1.3)
CALCIUM SERPL-MCNC: 7.5 MG/DL — LOW (ref 8.4–10.5)
CALCIUM SERPL-MCNC: 8 MG/DL — LOW (ref 8.4–10.5)
CALCIUM SERPL-MCNC: 9.4 MG/DL — SIGNIFICANT CHANGE UP (ref 8.4–10.5)
CHLORIDE SERPL-SCNC: 105 MMOL/L — SIGNIFICANT CHANGE UP (ref 96–108)
CHLORIDE SERPL-SCNC: 106 MMOL/L — SIGNIFICANT CHANGE UP (ref 96–108)
CHLORIDE SERPL-SCNC: 98 MMOL/L — SIGNIFICANT CHANGE UP (ref 96–108)
CO2 SERPL-SCNC: 20 MMOL/L — LOW (ref 22–31)
CO2 SERPL-SCNC: 22 MMOL/L — SIGNIFICANT CHANGE UP (ref 22–31)
CO2 SERPL-SCNC: 27 MMOL/L — SIGNIFICANT CHANGE UP (ref 22–31)
COHGB MFR BLDA: 0.5 % — SIGNIFICANT CHANGE UP
COHGB MFR BLDA: 0.8 % — SIGNIFICANT CHANGE UP
COHGB MFR BLDA: 1 % — SIGNIFICANT CHANGE UP
CREAT SERPL-MCNC: 0.91 MG/DL — SIGNIFICANT CHANGE UP (ref 0.5–1.3)
CREAT SERPL-MCNC: 1.24 MG/DL — SIGNIFICANT CHANGE UP (ref 0.5–1.3)
CREAT SERPL-MCNC: 1.32 MG/DL — HIGH (ref 0.5–1.3)
CULTURE RESULTS: SIGNIFICANT CHANGE UP
CULTURE RESULTS: SIGNIFICANT CHANGE UP
GAS PNL BLDA: SIGNIFICANT CHANGE UP
GLUCOSE BLDC GLUCOMTR-MCNC: 108 MG/DL — HIGH (ref 70–99)
GLUCOSE BLDC GLUCOMTR-MCNC: 133 MG/DL — HIGH (ref 70–99)
GLUCOSE BLDC GLUCOMTR-MCNC: 144 MG/DL — HIGH (ref 70–99)
GLUCOSE BLDC GLUCOMTR-MCNC: 193 MG/DL — HIGH (ref 70–99)
GLUCOSE SERPL-MCNC: 134 MG/DL — HIGH (ref 70–99)
GLUCOSE SERPL-MCNC: 154 MG/DL — HIGH (ref 70–99)
GLUCOSE SERPL-MCNC: 192 MG/DL — HIGH (ref 70–99)
HCO3 BLDA-SCNC: 20 MMOL/L — LOW (ref 21–28)
HCO3 BLDA-SCNC: 22 MMOL/L — SIGNIFICANT CHANGE UP (ref 21–28)
HCO3 BLDA-SCNC: 26 MMOL/L — SIGNIFICANT CHANGE UP (ref 21–28)
HCT VFR BLD CALC: 30 % — LOW (ref 39–50)
HCT VFR BLD CALC: 37 % — LOW (ref 39–50)
HCT VFR BLD CALC: 39.8 % — SIGNIFICANT CHANGE UP (ref 39–50)
HGB BLD-MCNC: 12.3 G/DL — LOW (ref 13–17)
HGB BLD-MCNC: 12.5 G/DL — LOW (ref 13–17)
HGB BLD-MCNC: 9.6 G/DL — LOW (ref 13–17)
HGB BLDA-MCNC: 11.1 G/DL — LOW (ref 13–17)
HGB BLDA-MCNC: 11.3 G/DL — LOW (ref 13–17)
HGB BLDA-MCNC: 9.2 G/DL — LOW (ref 13–17)
INR BLD: 1.21 — HIGH (ref 0.88–1.16)
LACTATE SERPL-SCNC: 1.6 MMOL/L — SIGNIFICANT CHANGE UP (ref 0.5–2)
MAGNESIUM SERPL-MCNC: 1.6 MG/DL — SIGNIFICANT CHANGE UP (ref 1.6–2.6)
MAGNESIUM SERPL-MCNC: 1.8 MG/DL — SIGNIFICANT CHANGE UP (ref 1.6–2.6)
MAGNESIUM SERPL-MCNC: 2.2 MG/DL — SIGNIFICANT CHANGE UP (ref 1.6–2.6)
MCHC RBC-ENTMCNC: 29.8 PG — SIGNIFICANT CHANGE UP (ref 27–34)
MCHC RBC-ENTMCNC: 30.3 PG — SIGNIFICANT CHANGE UP (ref 27–34)
MCHC RBC-ENTMCNC: 31.2 PG — SIGNIFICANT CHANGE UP (ref 27–34)
MCHC RBC-ENTMCNC: 31.4 G/DL — LOW (ref 32–36)
MCHC RBC-ENTMCNC: 32 G/DL — SIGNIFICANT CHANGE UP (ref 32–36)
MCHC RBC-ENTMCNC: 33.2 G/DL — SIGNIFICANT CHANGE UP (ref 32–36)
MCV RBC AUTO: 93.9 FL — SIGNIFICANT CHANGE UP (ref 80–100)
MCV RBC AUTO: 94.6 FL — SIGNIFICANT CHANGE UP (ref 80–100)
MCV RBC AUTO: 94.8 FL — SIGNIFICANT CHANGE UP (ref 80–100)
METHGB MFR BLDA: 0 % — SIGNIFICANT CHANGE UP
METHGB MFR BLDA: 0.1 % — SIGNIFICANT CHANGE UP
METHGB MFR BLDA: 0.1 % — SIGNIFICANT CHANGE UP
O2 CT VFR BLDA CALC: 13.2 ML/DL — SIGNIFICANT CHANGE UP (ref 15–23)
O2 CT VFR BLDA CALC: 15.9 ML/DL — SIGNIFICANT CHANGE UP (ref 15–23)
O2 CT VFR BLDA CALC: SIGNIFICANT CHANGE UP (ref 15–23)
OXYHGB MFR BLDA: 98 % — SIGNIFICANT CHANGE UP (ref 94–100)
OXYHGB MFR BLDA: 98 % — SIGNIFICANT CHANGE UP (ref 94–100)
OXYHGB MFR BLDA: 99 % — SIGNIFICANT CHANGE UP (ref 94–100)
PCO2 BLDA: 36 MMHG — SIGNIFICANT CHANGE UP (ref 35–48)
PCO2 BLDA: 44 MMHG — SIGNIFICANT CHANGE UP (ref 35–48)
PCO2 BLDA: 47 MMHG — SIGNIFICANT CHANGE UP (ref 35–48)
PH BLDA: 7.29 — LOW (ref 7.35–7.45)
PH BLDA: 7.37 — SIGNIFICANT CHANGE UP (ref 7.35–7.45)
PH BLDA: 7.38 — SIGNIFICANT CHANGE UP (ref 7.35–7.45)
PHOSPHATE SERPL-MCNC: 4 MG/DL — SIGNIFICANT CHANGE UP (ref 2.5–4.5)
PHOSPHATE SERPL-MCNC: 5.3 MG/DL — HIGH (ref 2.5–4.5)
PHOSPHATE SERPL-MCNC: 5.6 MG/DL — HIGH (ref 2.5–4.5)
PLATELET # BLD AUTO: 284 K/UL — SIGNIFICANT CHANGE UP (ref 150–400)
PLATELET # BLD AUTO: 340 K/UL — SIGNIFICANT CHANGE UP (ref 150–400)
PLATELET # BLD AUTO: 402 K/UL — HIGH (ref 150–400)
PO2 BLDA: 164 MMHG — HIGH (ref 83–108)
PO2 BLDA: 191 MMHG — HIGH (ref 83–108)
PO2 BLDA: 212 MMHG — HIGH (ref 83–108)
POTASSIUM BLDA-SCNC: 3.5 MMOL/L — SIGNIFICANT CHANGE UP (ref 3.5–4.9)
POTASSIUM BLDA-SCNC: 3.8 MMOL/L — SIGNIFICANT CHANGE UP (ref 3.5–4.9)
POTASSIUM BLDA-SCNC: 3.8 MMOL/L — SIGNIFICANT CHANGE UP (ref 3.5–4.9)
POTASSIUM SERPL-MCNC: 4.4 MMOL/L — SIGNIFICANT CHANGE UP (ref 3.5–5.3)
POTASSIUM SERPL-MCNC: 4.6 MMOL/L — SIGNIFICANT CHANGE UP (ref 3.5–5.3)
POTASSIUM SERPL-MCNC: 5 MMOL/L — SIGNIFICANT CHANGE UP (ref 3.5–5.3)
POTASSIUM SERPL-SCNC: 4.4 MMOL/L — SIGNIFICANT CHANGE UP (ref 3.5–5.3)
POTASSIUM SERPL-SCNC: 4.6 MMOL/L — SIGNIFICANT CHANGE UP (ref 3.5–5.3)
POTASSIUM SERPL-SCNC: 5 MMOL/L — SIGNIFICANT CHANGE UP (ref 3.5–5.3)
PROT SERPL-MCNC: 4.2 G/DL — LOW (ref 6–8.3)
PROT SERPL-MCNC: 5.1 G/DL — LOW (ref 6–8.3)
PROTHROM AB SERPL-ACNC: 13.5 SEC — HIGH (ref 9.8–12.7)
RBC # BLD: 3.17 M/UL — LOW (ref 4.2–5.8)
RBC # BLD: 3.94 M/UL — LOW (ref 4.2–5.8)
RBC # BLD: 4.2 M/UL — SIGNIFICANT CHANGE UP (ref 4.2–5.8)
RBC # FLD: 13.1 % — SIGNIFICANT CHANGE UP (ref 10.3–16.9)
RBC # FLD: 13.4 % — SIGNIFICANT CHANGE UP (ref 10.3–16.9)
RBC # FLD: 13.6 % — SIGNIFICANT CHANGE UP (ref 10.3–16.9)
SAO2 % BLDA: 99 % — SIGNIFICANT CHANGE UP (ref 95–100)
SODIUM BLDA-SCNC: 132 MMOL/L — LOW (ref 138–146)
SODIUM BLDA-SCNC: 134 MMOL/L — LOW (ref 138–146)
SODIUM BLDA-SCNC: 139 MMOL/L — SIGNIFICANT CHANGE UP (ref 138–146)
SODIUM SERPL-SCNC: 136 MMOL/L — SIGNIFICANT CHANGE UP (ref 135–145)
SODIUM SERPL-SCNC: 138 MMOL/L — SIGNIFICANT CHANGE UP (ref 135–145)
SODIUM SERPL-SCNC: 139 MMOL/L — SIGNIFICANT CHANGE UP (ref 135–145)
SPECIMEN SOURCE: SIGNIFICANT CHANGE UP
SPECIMEN SOURCE: SIGNIFICANT CHANGE UP
SURGICAL PATHOLOGY STUDY: SIGNIFICANT CHANGE UP
WBC # BLD: 10.9 K/UL — HIGH (ref 3.8–10.5)
WBC # BLD: 15 K/UL — HIGH (ref 3.8–10.5)
WBC # BLD: 8.8 K/UL — SIGNIFICANT CHANGE UP (ref 3.8–10.5)
WBC # FLD AUTO: 10.9 K/UL — HIGH (ref 3.8–10.5)
WBC # FLD AUTO: 15 K/UL — HIGH (ref 3.8–10.5)
WBC # FLD AUTO: 8.8 K/UL — SIGNIFICANT CHANGE UP (ref 3.8–10.5)

## 2017-12-19 PROCEDURE — 35646 BPG AORTOBIFEMORAL: CPT | Mod: 80,GC

## 2017-12-19 PROCEDURE — 35646 BPG AORTOBIFEMORAL: CPT | Mod: GC

## 2017-12-19 PROCEDURE — 35371 RECHANNELING OF ARTERY: CPT | Mod: 59,GC

## 2017-12-19 PROCEDURE — 35371 RECHANNELING OF ARTERY: CPT | Mod: 80,59,GC

## 2017-12-19 PROCEDURE — 99223 1ST HOSP IP/OBS HIGH 75: CPT | Mod: GC

## 2017-12-19 PROCEDURE — 71010: CPT | Mod: 26

## 2017-12-19 RX ORDER — DOCUSATE SODIUM 100 MG
100 CAPSULE ORAL THREE TIMES A DAY
Qty: 0 | Refills: 0 | Status: DISCONTINUED | OUTPATIENT
Start: 2017-12-19 | End: 2017-12-19

## 2017-12-19 RX ORDER — ONDANSETRON 8 MG/1
4 TABLET, FILM COATED ORAL EVERY 6 HOURS
Qty: 0 | Refills: 0 | Status: DISCONTINUED | OUTPATIENT
Start: 2017-12-19 | End: 2017-12-19

## 2017-12-19 RX ORDER — VANCOMYCIN HCL 1 G
1500 VIAL (EA) INTRAVENOUS EVERY 12 HOURS
Qty: 0 | Refills: 0 | Status: DISCONTINUED | OUTPATIENT
Start: 2017-12-19 | End: 2017-12-19

## 2017-12-19 RX ORDER — FENTANYL CITRATE 50 UG/ML
0.5 INJECTION INTRAVENOUS
Qty: 2500 | Refills: 0 | Status: DISCONTINUED | OUTPATIENT
Start: 2017-12-19 | End: 2017-12-20

## 2017-12-19 RX ORDER — PIPERACILLIN AND TAZOBACTAM 4; .5 G/20ML; G/20ML
3.38 INJECTION, POWDER, LYOPHILIZED, FOR SOLUTION INTRAVENOUS EVERY 6 HOURS
Qty: 0 | Refills: 0 | Status: DISCONTINUED | OUTPATIENT
Start: 2017-12-19 | End: 2017-12-20

## 2017-12-19 RX ORDER — SODIUM CHLORIDE 9 MG/ML
1000 INJECTION INTRAMUSCULAR; INTRAVENOUS; SUBCUTANEOUS ONCE
Qty: 0 | Refills: 0 | Status: COMPLETED | OUTPATIENT
Start: 2017-12-19 | End: 2017-12-19

## 2017-12-19 RX ORDER — SODIUM CHLORIDE 9 MG/ML
1000 INJECTION, SOLUTION INTRAVENOUS
Qty: 0 | Refills: 0 | Status: DISCONTINUED | OUTPATIENT
Start: 2017-12-19 | End: 2017-12-19

## 2017-12-19 RX ORDER — SODIUM CHLORIDE 9 MG/ML
1000 INJECTION INTRAMUSCULAR; INTRAVENOUS; SUBCUTANEOUS
Qty: 0 | Refills: 0 | Status: DISCONTINUED | OUTPATIENT
Start: 2017-12-19 | End: 2017-12-21

## 2017-12-19 RX ORDER — METOPROLOL TARTRATE 50 MG
5 TABLET ORAL EVERY 6 HOURS
Qty: 0 | Refills: 0 | Status: DISCONTINUED | OUTPATIENT
Start: 2017-12-19 | End: 2017-12-19

## 2017-12-19 RX ORDER — PIPERACILLIN AND TAZOBACTAM 4; .5 G/20ML; G/20ML
4.5 INJECTION, POWDER, LYOPHILIZED, FOR SOLUTION INTRAVENOUS EVERY 6 HOURS
Qty: 0 | Refills: 0 | Status: DISCONTINUED | OUTPATIENT
Start: 2017-12-19 | End: 2017-12-19

## 2017-12-19 RX ORDER — ALBUMIN HUMAN 25 %
250 VIAL (ML) INTRAVENOUS ONCE
Qty: 0 | Refills: 0 | Status: COMPLETED | OUTPATIENT
Start: 2017-12-19 | End: 2017-12-19

## 2017-12-19 RX ORDER — PROPOFOL 10 MG/ML
5 INJECTION, EMULSION INTRAVENOUS
Qty: 1000 | Refills: 0 | Status: DISCONTINUED | OUTPATIENT
Start: 2017-12-19 | End: 2017-12-20

## 2017-12-19 RX ORDER — INSULIN LISPRO 100/ML
VIAL (ML) SUBCUTANEOUS EVERY 6 HOURS
Qty: 0 | Refills: 0 | Status: DISCONTINUED | OUTPATIENT
Start: 2017-12-19 | End: 2017-12-29

## 2017-12-19 RX ORDER — PANTOPRAZOLE SODIUM 20 MG/1
40 TABLET, DELAYED RELEASE ORAL DAILY
Qty: 0 | Refills: 0 | Status: DISCONTINUED | OUTPATIENT
Start: 2017-12-19 | End: 2017-12-20

## 2017-12-19 RX ORDER — CHLORHEXIDINE GLUCONATE 213 G/1000ML
15 SOLUTION TOPICAL
Qty: 0 | Refills: 0 | Status: DISCONTINUED | OUTPATIENT
Start: 2017-12-19 | End: 2017-12-20

## 2017-12-19 RX ADMIN — PIPERACILLIN AND TAZOBACTAM 200 GRAM(S): 4; .5 INJECTION, POWDER, LYOPHILIZED, FOR SOLUTION INTRAVENOUS at 22:33

## 2017-12-19 RX ADMIN — Medication 12.5 MILLIGRAM(S): at 06:13

## 2017-12-19 RX ADMIN — OXYCODONE AND ACETAMINOPHEN 1 TABLET(S): 5; 325 TABLET ORAL at 04:14

## 2017-12-19 RX ADMIN — SODIUM CHLORIDE 60.06 MILLILITER(S): 9 INJECTION INTRAMUSCULAR; INTRAVENOUS; SUBCUTANEOUS at 16:15

## 2017-12-19 RX ADMIN — SODIUM CHLORIDE 150 MILLILITER(S): 9 INJECTION, SOLUTION INTRAVENOUS at 15:40

## 2017-12-19 RX ADMIN — SODIUM CHLORIDE 60.06 MILLILITER(S): 9 INJECTION INTRAMUSCULAR; INTRAVENOUS; SUBCUTANEOUS at 17:09

## 2017-12-19 RX ADMIN — Medication 125 MILLILITER(S): at 19:00

## 2017-12-19 RX ADMIN — PROPOFOL 3.27 MICROGRAM(S)/KG/MIN: 10 INJECTION, EMULSION INTRAVENOUS at 15:01

## 2017-12-19 RX ADMIN — PIPERACILLIN AND TAZOBACTAM 200 GRAM(S): 4; .5 INJECTION, POWDER, LYOPHILIZED, FOR SOLUTION INTRAVENOUS at 16:30

## 2017-12-19 RX ADMIN — FENTANYL CITRATE 5.45 MICROGRAM(S)/KG/HR: 50 INJECTION INTRAVENOUS at 15:01

## 2017-12-19 RX ADMIN — SODIUM CHLORIDE 100 MILLILITER(S): 9 INJECTION INTRAMUSCULAR; INTRAVENOUS; SUBCUTANEOUS at 02:18

## 2017-12-19 RX ADMIN — Medication 300 MILLIGRAM(S): at 16:29

## 2017-12-19 RX ADMIN — SODIUM CHLORIDE 150 MILLILITER(S): 9 INJECTION INTRAMUSCULAR; INTRAVENOUS; SUBCUTANEOUS at 16:02

## 2017-12-19 RX ADMIN — OXYCODONE AND ACETAMINOPHEN 1 TABLET(S): 5; 325 TABLET ORAL at 05:14

## 2017-12-19 RX ADMIN — Medication 5 MILLIGRAM(S): at 17:36

## 2017-12-19 RX ADMIN — CHLORHEXIDINE GLUCONATE 15 MILLILITER(S): 213 SOLUTION TOPICAL at 17:36

## 2017-12-19 RX ADMIN — PIPERACILLIN AND TAZOBACTAM 200 GRAM(S): 4; .5 INJECTION, POWDER, LYOPHILIZED, FOR SOLUTION INTRAVENOUS at 05:10

## 2017-12-19 RX ADMIN — SODIUM CHLORIDE 150 MILLILITER(S): 9 INJECTION INTRAMUSCULAR; INTRAVENOUS; SUBCUTANEOUS at 22:33

## 2017-12-19 RX ADMIN — PROPOFOL 3.27 MICROGRAM(S)/KG/MIN: 10 INJECTION, EMULSION INTRAVENOUS at 18:59

## 2017-12-19 RX ADMIN — PANTOPRAZOLE SODIUM 40 MILLIGRAM(S): 20 TABLET, DELAYED RELEASE ORAL at 22:35

## 2017-12-19 RX ADMIN — SODIUM CHLORIDE 2000 MILLILITER(S): 9 INJECTION INTRAMUSCULAR; INTRAVENOUS; SUBCUTANEOUS at 18:30

## 2017-12-19 NOTE — CONSULT NOTE ADULT - SUBJECTIVE AND OBJECTIVE BOX
SICU CONSULT NOTE    HPI:  62M poor history w/ HTN who presented to [facility] ED 12/13/17 w/ non-healing RLE ulcers & cellulitis. Pt is followed by wound care clinic in [place]. Referred to [facility] for eval but missed appt thus presented to ED. R medial knee ulcer developed 1yr ago after a fall. R posterior ankle ulcer developed 1mo ago w/o trauma. Was on ASA, Plavix, & Norvasc until 1wk prior to admission; now only taking Bumex & K+ meds. Pt was admitted to Vascular Surgery, started on vancomycin/Zosyn, & worked up for PAD. CTA 12/13 showed occluded abd aorta below renal arteries; occluded R RAKESH, IIA, EIA, & CFA w/ distal reconstitution; & occluded L RAKESH, L IIA, & L EIA w/ distal reconstitution. Bilateral lower extremities has 3-vessel runoff. Lexiscan 12/14- normal LVSF w/ no regional WMA. EKG stress test 12/14- normal. Echo 12/17- LVEF 70% w/ no WMA. On 12/15, Dr. Olsen (Plastics) debrided RLE wound to remove infectious source prior to PTFE aorto-bifem bypass. Taken to OR 12/19 for aorto-bifemoral bypass w/ [id number] Hemashield graft. Suprarenal clamp time 8min. Palpable pulse distal to anastomosis at end of case. EBL 800cc. Given 5L LR & 2u PRBCs. UOP 350cc. Intermittently on pressors during case but arrived to SICU intubated, sedated & paralyzed but hemodynamically stable off pressors.     PMHx: HTN, PAD, Cellulitis    PSHx: Tonsillectomy/adenoidectomy    REVIEW OF SYSTEMS: Pertinent positives/negatives noted in HPI.     HOME MEDS: ASA, Plavix, amlodipine    ALLERGIES: NKDA    SocHx: Non-smoker. No EtOH or drug use. [employer].  w/ son. Works as [employer] .     FHx: NC    Vitals: T(C): 37.3 / T(F): 99.2 / HR: 68 / BP: 149/73 / RR: 20 / SpO2: 98%     PHYSICAL EXAM:  NEURO: Sedated but arousable, RASS -2  CV: RRR, S1&S2  PULM: Intubated on ventilator AC settings, CTAB  ABD: Soft, midly distended, unable to assess tenderness d/t sedation. Midline incision island dressing w/ sanguinous stain at inferior aspect; no signs of hematoma formation. Bilateral groin incisions covered by Prevena vacs w/ good seal. MEHRAN drains to continuous wall suction in bilateral groin incisions- SS.   EXTR: WWP, peripheral edema, bilateral lower extremity chronic ulcers  VASC: R biphasic popliteal signal, R monophasic PT signal; L biphasic DP/PT signals    LABS:                        12.5   8.8   )-----------( 402      ( 19 Dec 2017 06:47 )             39.8     139  |  98  |  9   ----------------------------<  134<H>  4.4   |  27  |  0.91    Ca    9.4      19 Dec 2017 06:47  Phos  4.0     12-19  Mg     2.2     12-19    PT/INR - ( 18 Dec 2017 07:47 )   PT: 13.2 sec;   INR: 1.19     PTT - ( 18 Dec 2017 07:47 )  PTT:34.4 sec

## 2017-12-19 NOTE — BRIEF OPERATIVE NOTE - PROCEDURE
<<-----Click on this checkbox to enter Procedure Aortobifemoral bypass graft  12/19/2017    Active  GABINOATO

## 2017-12-19 NOTE — CONSULT NOTE ADULT - ASSESSMENT
Neuro: Propofol gtt, fentanyl gtt   CV: Metoprolol 5q6, LVEF 70%; normal NST. Cards ([facility]) consult.   Pulm: Intubated- AC  GI/FEN: NPO, LR@150, Protonix  : Solorio to gravity  ID: Vancomycin (12/13--), Zosyn (12/13--). OR wound cx (12/15)- NGTD. F/u patella bx to r/o OM.   Endo: ISS  Heme: Hold SQH.   PPx: No SCDs. Hold SQH. Duplex 12/16- No DVT. Protonix.  Lines: PIVs, L radial a-line (12/19--), RIJ (12/19--)  Drains: MEHRAN drains x 2 in bilateral marvin to continuous wall suction  Wound: Bilateral groin incisions w/ Prevena vacs. Midline incision w/ island dressing. BLE wounds w/ WTD Dakins dressing changes. Plastics ([facility]) consult.  PT: Bedrest

## 2017-12-19 NOTE — BRIEF OPERATIVE NOTE - OPERATION/FINDINGS
s/p Right LE nonhealing wounds x3
Procedure: Aorto-Bifemoral bypass with 16x8x8 mm hemashield graft    Suprarenal clamp time 8 minutes    Findings: palpable pulse distal to anastamosis

## 2017-12-19 NOTE — PROGRESS NOTE ADULT - SUBJECTIVE AND OBJECTIVE BOX
24hr Events:  O/N: NPO/IVF, pre-oped, consented, VSS, ordered 10AM vancomycin trough for lab draw  12/18: Pre op for OR tomorrow. Blood on hold.     Assessment/Plan:  63 yo M with RLE nonhealing ulcers and PAD, chronic infra renal aortic occulsion     -Pain/nausea PRN  -NPO for aorto-bifemoral artery bypass  -IV abx- vanc/zosyn  -BID WTD with Dakins  -f/u Cards recs  -Plan for eventual NPWT and interval reconstruction with plastics once infection under control and lower extremity revascularized.

## 2017-12-20 LAB
ANION GAP SERPL CALC-SCNC: 13 MMOL/L — SIGNIFICANT CHANGE UP (ref 5–17)
ANION GAP SERPL CALC-SCNC: 14 MMOL/L — SIGNIFICANT CHANGE UP (ref 5–17)
BUN SERPL-MCNC: 13 MG/DL — SIGNIFICANT CHANGE UP (ref 7–23)
BUN SERPL-MCNC: 13 MG/DL — SIGNIFICANT CHANGE UP (ref 7–23)
CALCIUM SERPL-MCNC: 6.8 MG/DL — LOW (ref 8.4–10.5)
CALCIUM SERPL-MCNC: 7.4 MG/DL — LOW (ref 8.4–10.5)
CHLORIDE SERPL-SCNC: 105 MMOL/L — SIGNIFICANT CHANGE UP (ref 96–108)
CHLORIDE SERPL-SCNC: 108 MMOL/L — SIGNIFICANT CHANGE UP (ref 96–108)
CO2 SERPL-SCNC: 17 MMOL/L — LOW (ref 22–31)
CO2 SERPL-SCNC: 19 MMOL/L — LOW (ref 22–31)
CREAT SERPL-MCNC: 1.43 MG/DL — HIGH (ref 0.5–1.3)
CREAT SERPL-MCNC: 1.47 MG/DL — HIGH (ref 0.5–1.3)
GLUCOSE BLDC GLUCOMTR-MCNC: 133 MG/DL — HIGH (ref 70–99)
GLUCOSE BLDC GLUCOMTR-MCNC: 138 MG/DL — HIGH (ref 70–99)
GLUCOSE BLDC GLUCOMTR-MCNC: 148 MG/DL — HIGH (ref 70–99)
GLUCOSE BLDC GLUCOMTR-MCNC: 167 MG/DL — HIGH (ref 70–99)
GLUCOSE SERPL-MCNC: 143 MG/DL — HIGH (ref 70–99)
GLUCOSE SERPL-MCNC: 152 MG/DL — HIGH (ref 70–99)
HBA1C BLD-MCNC: 5.7 % — HIGH (ref 4–5.6)
HCT VFR BLD CALC: 30.3 % — LOW (ref 39–50)
HCT VFR BLD CALC: 31.4 % — LOW (ref 39–50)
HGB BLD-MCNC: 10.1 G/DL — LOW (ref 13–17)
HGB BLD-MCNC: 10.3 G/DL — LOW (ref 13–17)
MAGNESIUM SERPL-MCNC: 1.7 MG/DL — SIGNIFICANT CHANGE UP (ref 1.6–2.6)
MAGNESIUM SERPL-MCNC: 1.9 MG/DL — SIGNIFICANT CHANGE UP (ref 1.6–2.6)
MCHC RBC-ENTMCNC: 30.5 PG — SIGNIFICANT CHANGE UP (ref 27–34)
MCHC RBC-ENTMCNC: 30.7 PG — SIGNIFICANT CHANGE UP (ref 27–34)
MCHC RBC-ENTMCNC: 32.8 G/DL — SIGNIFICANT CHANGE UP (ref 32–36)
MCHC RBC-ENTMCNC: 33.3 G/DL — SIGNIFICANT CHANGE UP (ref 32–36)
MCV RBC AUTO: 92.1 FL — SIGNIFICANT CHANGE UP (ref 80–100)
MCV RBC AUTO: 92.9 FL — SIGNIFICANT CHANGE UP (ref 80–100)
PHOSPHATE SERPL-MCNC: 4.6 MG/DL — HIGH (ref 2.5–4.5)
PHOSPHATE SERPL-MCNC: 5.2 MG/DL — HIGH (ref 2.5–4.5)
PLATELET # BLD AUTO: 263 K/UL — SIGNIFICANT CHANGE UP (ref 150–400)
PLATELET # BLD AUTO: 281 K/UL — SIGNIFICANT CHANGE UP (ref 150–400)
POTASSIUM SERPL-MCNC: 4.3 MMOL/L — SIGNIFICANT CHANGE UP (ref 3.5–5.3)
POTASSIUM SERPL-MCNC: 4.8 MMOL/L — SIGNIFICANT CHANGE UP (ref 3.5–5.3)
POTASSIUM SERPL-SCNC: 4.3 MMOL/L — SIGNIFICANT CHANGE UP (ref 3.5–5.3)
POTASSIUM SERPL-SCNC: 4.8 MMOL/L — SIGNIFICANT CHANGE UP (ref 3.5–5.3)
RBC # BLD: 3.29 M/UL — LOW (ref 4.2–5.8)
RBC # BLD: 3.38 M/UL — LOW (ref 4.2–5.8)
RBC # FLD: 13.7 % — SIGNIFICANT CHANGE UP (ref 10.3–16.9)
RBC # FLD: 14.1 % — SIGNIFICANT CHANGE UP (ref 10.3–16.9)
SODIUM SERPL-SCNC: 137 MMOL/L — SIGNIFICANT CHANGE UP (ref 135–145)
SODIUM SERPL-SCNC: 139 MMOL/L — SIGNIFICANT CHANGE UP (ref 135–145)
VANCOMYCIN TROUGH SERPL-MCNC: 21.6 UG/ML — HIGH (ref 10–20)
WBC # BLD: 8.8 K/UL — SIGNIFICANT CHANGE UP (ref 3.8–10.5)
WBC # BLD: 9.2 K/UL — SIGNIFICANT CHANGE UP (ref 3.8–10.5)
WBC # FLD AUTO: 8.8 K/UL — SIGNIFICANT CHANGE UP (ref 3.8–10.5)
WBC # FLD AUTO: 9.2 K/UL — SIGNIFICANT CHANGE UP (ref 3.8–10.5)

## 2017-12-20 PROCEDURE — 99254 IP/OBS CNSLTJ NEW/EST MOD 60: CPT | Mod: GC

## 2017-12-20 PROCEDURE — 99232 SBSQ HOSP IP/OBS MODERATE 35: CPT

## 2017-12-20 PROCEDURE — 99233 SBSQ HOSP IP/OBS HIGH 50: CPT | Mod: GC

## 2017-12-20 PROCEDURE — 71010: CPT | Mod: 26

## 2017-12-20 RX ORDER — CEFEPIME 1 G/1
INJECTION, POWDER, FOR SOLUTION INTRAMUSCULAR; INTRAVENOUS
Qty: 0 | Refills: 0 | Status: DISCONTINUED | OUTPATIENT
Start: 2017-12-20 | End: 2017-12-21

## 2017-12-20 RX ORDER — SODIUM CHLORIDE 9 MG/ML
500 INJECTION INTRAMUSCULAR; INTRAVENOUS; SUBCUTANEOUS ONCE
Qty: 0 | Refills: 0 | Status: COMPLETED | OUTPATIENT
Start: 2017-12-20 | End: 2017-12-20

## 2017-12-20 RX ORDER — DIPHENHYDRAMINE HCL 50 MG
50 CAPSULE ORAL ONCE
Qty: 0 | Refills: 0 | Status: COMPLETED | OUTPATIENT
Start: 2017-12-20 | End: 2017-12-23

## 2017-12-20 RX ORDER — MAGNESIUM SULFATE 500 MG/ML
1 VIAL (ML) INJECTION ONCE
Qty: 0 | Refills: 0 | Status: COMPLETED | OUTPATIENT
Start: 2017-12-20 | End: 2017-12-20

## 2017-12-20 RX ORDER — HYDROMORPHONE HYDROCHLORIDE 2 MG/ML
1 INJECTION INTRAMUSCULAR; INTRAVENOUS; SUBCUTANEOUS EVERY 4 HOURS
Qty: 0 | Refills: 0 | Status: DISCONTINUED | OUTPATIENT
Start: 2017-12-20 | End: 2017-12-22

## 2017-12-20 RX ORDER — SODIUM HYPOCHLORITE 0.125 %
1 SOLUTION, NON-ORAL MISCELLANEOUS DAILY
Qty: 0 | Refills: 0 | Status: DISCONTINUED | OUTPATIENT
Start: 2017-12-20 | End: 2017-12-29

## 2017-12-20 RX ORDER — CEFEPIME 1 G/1
2000 INJECTION, POWDER, FOR SOLUTION INTRAMUSCULAR; INTRAVENOUS EVERY 8 HOURS
Qty: 0 | Refills: 0 | Status: DISCONTINUED | OUTPATIENT
Start: 2017-12-20 | End: 2017-12-21

## 2017-12-20 RX ORDER — MAGNESIUM SULFATE 500 MG/ML
2 VIAL (ML) INJECTION ONCE
Qty: 0 | Refills: 0 | Status: COMPLETED | OUTPATIENT
Start: 2017-12-20 | End: 2017-12-20

## 2017-12-20 RX ORDER — CEFEPIME 1 G/1
2000 INJECTION, POWDER, FOR SOLUTION INTRAMUSCULAR; INTRAVENOUS ONCE
Qty: 0 | Refills: 0 | Status: COMPLETED | OUTPATIENT
Start: 2017-12-20 | End: 2017-12-20

## 2017-12-20 RX ORDER — HYDROMORPHONE HYDROCHLORIDE 2 MG/ML
0.5 INJECTION INTRAMUSCULAR; INTRAVENOUS; SUBCUTANEOUS EVERY 4 HOURS
Qty: 0 | Refills: 0 | Status: DISCONTINUED | OUTPATIENT
Start: 2017-12-20 | End: 2017-12-24

## 2017-12-20 RX ORDER — SODIUM HYPOCHLORITE 0.125 %
1 SOLUTION, NON-ORAL MISCELLANEOUS
Qty: 0 | Refills: 0 | Status: DISCONTINUED | OUTPATIENT
Start: 2017-12-20 | End: 2017-12-20

## 2017-12-20 RX ORDER — METOPROLOL TARTRATE 50 MG
2.5 TABLET ORAL EVERY 6 HOURS
Qty: 0 | Refills: 0 | Status: DISCONTINUED | OUTPATIENT
Start: 2017-12-20 | End: 2017-12-21

## 2017-12-20 RX ADMIN — HYDROMORPHONE HYDROCHLORIDE 1 MILLIGRAM(S): 2 INJECTION INTRAMUSCULAR; INTRAVENOUS; SUBCUTANEOUS at 14:28

## 2017-12-20 RX ADMIN — HYDROMORPHONE HYDROCHLORIDE 1 MILLIGRAM(S): 2 INJECTION INTRAMUSCULAR; INTRAVENOUS; SUBCUTANEOUS at 20:43

## 2017-12-20 RX ADMIN — Medication 2: at 12:14

## 2017-12-20 RX ADMIN — FENTANYL CITRATE 5.45 MICROGRAM(S)/KG/HR: 50 INJECTION INTRAVENOUS at 10:07

## 2017-12-20 RX ADMIN — Medication 2.5 MILLIGRAM(S): at 16:42

## 2017-12-20 RX ADMIN — HYDROMORPHONE HYDROCHLORIDE 0.5 MILLIGRAM(S): 2 INJECTION INTRAMUSCULAR; INTRAVENOUS; SUBCUTANEOUS at 17:00

## 2017-12-20 RX ADMIN — CEFEPIME 100 MILLIGRAM(S): 1 INJECTION, POWDER, FOR SOLUTION INTRAMUSCULAR; INTRAVENOUS at 17:38

## 2017-12-20 RX ADMIN — CEFEPIME 100 MILLIGRAM(S): 1 INJECTION, POWDER, FOR SOLUTION INTRAMUSCULAR; INTRAVENOUS at 22:59

## 2017-12-20 RX ADMIN — HYDROMORPHONE HYDROCHLORIDE 1 MILLIGRAM(S): 2 INJECTION INTRAMUSCULAR; INTRAVENOUS; SUBCUTANEOUS at 22:00

## 2017-12-20 RX ADMIN — Medication 50 GRAM(S): at 01:47

## 2017-12-20 RX ADMIN — CHLORHEXIDINE GLUCONATE 15 MILLILITER(S): 213 SOLUTION TOPICAL at 08:03

## 2017-12-20 RX ADMIN — Medication 100 GRAM(S): at 08:02

## 2017-12-20 RX ADMIN — SODIUM CHLORIDE 30.03 MILLILITER(S): 9 INJECTION INTRAMUSCULAR; INTRAVENOUS; SUBCUTANEOUS at 16:43

## 2017-12-20 RX ADMIN — PIPERACILLIN AND TAZOBACTAM 200 GRAM(S): 4; .5 INJECTION, POWDER, LYOPHILIZED, FOR SOLUTION INTRAVENOUS at 06:36

## 2017-12-20 RX ADMIN — HYDROMORPHONE HYDROCHLORIDE 1 MILLIGRAM(S): 2 INJECTION INTRAMUSCULAR; INTRAVENOUS; SUBCUTANEOUS at 14:50

## 2017-12-20 RX ADMIN — HYDROMORPHONE HYDROCHLORIDE 0.5 MILLIGRAM(S): 2 INJECTION INTRAMUSCULAR; INTRAVENOUS; SUBCUTANEOUS at 23:13

## 2017-12-20 RX ADMIN — Medication 1 APPLICATION(S): at 10:30

## 2017-12-20 RX ADMIN — HYDROMORPHONE HYDROCHLORIDE 0.5 MILLIGRAM(S): 2 INJECTION INTRAMUSCULAR; INTRAVENOUS; SUBCUTANEOUS at 16:42

## 2017-12-20 RX ADMIN — PIPERACILLIN AND TAZOBACTAM 200 GRAM(S): 4; .5 INJECTION, POWDER, LYOPHILIZED, FOR SOLUTION INTRAVENOUS at 12:14

## 2017-12-20 RX ADMIN — PANTOPRAZOLE SODIUM 40 MILLIGRAM(S): 20 TABLET, DELAYED RELEASE ORAL at 11:09

## 2017-12-20 NOTE — PROGRESS NOTE ADULT - SUBJECTIVE AND OBJECTIVE BOX
S:63 yo M poor historian with PMHx of HTN and PVD admitted for RLE necrotic ulcers and IV antibiotics. He is POD #5 s/p debridement of RLE arterial ulcers and POD#1 s/p aortofemoral bypass. He is intubated and doing well with NPWT in place to RLE proximal leg wounds. He will be extubated while in ICU shortly today.    O:  ICU Vital Signs Last 24 Hrs  T(C): 37.2 (20 Dec 2017 09:04), Max: 37.9 (20 Dec 2017 05:29)  T(F): 99 (20 Dec 2017 09:04), Max: 100.3 (20 Dec 2017 05:29)  HR: 98 (20 Dec 2017 10:00) (56 - 106)  BP: 140/58 (20 Dec 2017 10:00) (88/47 - 195/81)  BP(mean): 84 (20 Dec 2017 10:00) (64 - 98)  ABP: 110/52 (20 Dec 2017 10:00) (94/43 - 166/73)  ABP(mean): 72 (20 Dec 2017 10:00) (60 - 82)  RR: 15 (20 Dec 2017 10:00) (3 - 24)  SpO2: 99% (20 Dec 2017 10:00) (97% - 100%)  I&O's Detail    19 Dec 2017 07:01  -  20 Dec 2017 07:00  --------------------------------------------------------  IN:    Albumin 5%  - 250 mL: 250 mL    fentaNYL  Infusion: 192 mL    IV PiggyBack: 700 mL    lactated ringers.: 150 mL    Packed Red Blood Cells: 350 mL    propofol Infusion: 317 mL    Sodium Chloride 0.9% IV Bolus: 3000 mL    sodium chloride 0.9%.: 2250 mL  Total IN: 7209 mL    OUT:    Drain: 140 mL    Indwelling Catheter - Urethral: 611 mL    Nasoenteral Tube: 100 mL  Total OUT: 851 mL    Total NET: 6358 mL      20 Dec 2017 07:01  -  20 Dec 2017 10:50  --------------------------------------------------------  IN:    fentaNYL  Infusion: 20 mL    sodium chloride 0.9%.: 450 mL  Total IN: 470 mL    OUT:    Indwelling Catheter - Urethral: 155 mL  Total OUT: 155 mL    Total NET: 315 mL      CBC Full  -  ( 20 Dec 2017 04:54 )  WBC Count : 9.2 K/uL  Hemoglobin : 10.1 g/dL  Hematocrit : 30.3 %  Platelet Count - Automated : 263 K/uL  Mean Cell Volume : 92.1 fL  Mean Cell Hemoglobin : 30.7 pg  Mean Cell Hemoglobin Concentration : 33.3 g/dL  Auto Neutrophil # : x  Auto Lymphocyte # : x  Auto Monocyte # : x  Auto Eosinophil # : x  Auto Basophil # : x  Auto Neutrophil % : x  Auto Lymphocyte % : x  Auto Monocyte % : x  Auto Eosinophil % : x  Auto Basophil % : x  12-20    139  |  108  |  13  ----------------------------<  143<H>  4.3   |  17<L>  |  1.47<H>    Ca    6.8<L>      20 Dec 2017 04:53  Phos  4.6     12-20  Mg     1.9     12-20    TPro  4.2<L>  /  Alb  2.2<L>  /  TBili  0.8  /  DBili  x   /  AST  40  /  ALT  61<H>  /  AlkPhos  51  12-19        Physical Exam: gen- NAD  pulm- CTA b/l  cardio- s1s2 wnl  abd- soft nt/nd  ext- RLE- NPWT removed at bedside. Medial knee wound with necrotic material present since the debridement likely from suboptimal blood supply. Anterior knee wound clean with no purulence/malodor. Posterior leg wound clean and minimal new necrotic material at inferior-most aspect of wound. Leg considerably less cellulitic overall. Foot warm and well-perfused. 	                           Assessment and Plan:    Assessment:  · Assessment		  63 yo M with RLE nonhealing ulcers and PAD and extensive RLE cellulitis POD #5 s/p wound debridements and POD #1 s/p aortobifemoral bypass grafting.  -ID consult for culture proven osteomyelitis.  -NPWT place on anterior knee wound and posterior leg wound via Y-connector. Medial knee wound will continue w Dakins due to necrotic material.  -Plan for interval reconstruction once infection under control and stable from vascular perspective.  -Next NPWT change on friday, 12/22.

## 2017-12-20 NOTE — PROGRESS NOTE ADULT - SUBJECTIVE AND OBJECTIVE BOX
Chief Complaint/Reason for Consult: periop cv mgmt  INTERVAL HPI: post op events noted for hypotension needeing pressors, transgfer to sicu  	  MEDICATIONS:    piperacillin/tazobactam IVPB. 3.375 Gram(s) IV Intermittent every 6 hours      HYDROmorphone  Injectable 1 milliGRAM(s) IV Push every 4 hours PRN  HYDROmorphone  Injectable 0.5 milliGRAM(s) IV Push every 4 hours PRN      insulin lispro (HumaLOG) corrective regimen sliding scale   SubCutaneous every 6 hours    Dakins Solution - 1/4 Strength 1 Application(s) Topical daily  sodium chloride 0.9%. 1000 milliLiter(s) IV Continuous <Continuous>      REVIEW OF SYSTEMS:  [x] As per HPI  CONSTITUTIONAL: No fever, weight loss, or fatigue  RESPIRATORY: No cough, wheezing, chills or hemoptysis; No Shortness of Breath  CARDIOVASCULAR: No chest pain, palpitations, dizziness, or leg swelling  GASTROINTESTINAL: No abdominal or epigastric pain. No nausea, vomiting, or hematemesis; No diarrhea or constipation. No melena or hematochezia.  MUSCULOSKELETAL: No joint pain or swelling; No muscle, back, or extremity pain  [x] All others negative	  [ ] Unable to obtain    PHYSICAL EXAM:  T(C): 37.2 (12-20-17 @ 09:04), Max: 37.9 (12-20-17 @ 05:29)  HR: 100 (12-20-17 @ 15:00) (56 - 118)  BP: 128/55 (12-20-17 @ 15:00) (88/47 - 200/72)  RR: 18 (12-20-17 @ 15:00) (3 - 28)  SpO2: 99% (12-20-17 @ 15:00) (91% - 100%)  Wt(kg): --  I&O's Summary    19 Dec 2017 07:01  -  20 Dec 2017 07:00  --------------------------------------------------------  IN: 7209 mL / OUT: 851 mL / NET: 6358 mL    20 Dec 2017 07:01  -  20 Dec 2017 15:37  --------------------------------------------------------  IN: 1084 mL / OUT: 450 mL / NET: 634 mL          Appearance: Normal	  HEENT:   Normal oral mucosa  Cardiovascular: Normal S1 S2, No JVD, No murmurs, No edema  Respiratory: Lungs clear to auscultation	  Gastrointestinal:  Soft, Non-tender, + BS	  Extremities: Normal range of motion, No clubbing, cyanosis or edema  Vascular: Peripheral pulses palpable 2+ bilaterally    TELEMETRY: 	    ECG:   	  RADIOLOGY:   CXR:  CT:  US:    CARDIAC TESTING:  Echocardiogram:  Catheterization:  Stress Test:      LABS:	 	    CARDIAC MARKERS:                                  10.1   9.2   )-----------( 263      ( 20 Dec 2017 04:54 )             30.3     12-20    139  |  108  |  13  ----------------------------<  143<H>  4.3   |  17<L>  |  1.47<H>    Ca    6.8<L>      20 Dec 2017 04:53  Phos  4.6     12-20  Mg     1.9     12-20    TPro  4.2<L>  /  Alb  2.2<L>  /  TBili  0.8  /  DBili  x   /  AST  40  /  ALT  61<H>  /  AlkPhos  51  12-19    proBNP:   Lipid Profile:   HgA1c: Hemoglobin A1C, Whole Blood: 5.7 % (12-20 @ 04:54)    TSH:     ASSESSMENT/PLAN: 	    #CAD - no signs of ischemia post op, tele no events no svt, okay to hold BB.    #HTN - pressors prn per SICU    #CV Prevention -   q3mo Fasting Lipid Profile, Goal LDL<100, statin as tolerated.  q6week TSH check  q3mo 25-OHD Vitamin D Level, Goal 50, supplement as tolerated

## 2017-12-20 NOTE — CONSULT NOTE ADULT - SUBJECTIVE AND OBJECTIVE BOX
ID INITIAL CONSULT NOTE  HPI:  62yoM poor historian with PMHx of htn sent to ED by Dr Gonzalez for RLE nonhealing ulcers.  Was seen by wound care clinic [place] [facility] and was told to see Dr. Gonzalez in office today but due to being stuck in traffic patient could not make appointment and came straight to ED to be evaluated.  Patient states medial knee ulcer x1yr which began due to a fall and has had a debridement in office(11/11/17-not by Dr. Gonzalez) and posterior ankle ulcer x2hccop(no trauma).   Was seen by another vascular surgeon and was told patient needed surgery and patient at the time refused.  + reports d/c of asa / plavix/norvasc ~ 1 week and taking only Bumex and K+ meds now + weeping ulcerations requiring multiple dressing changes per day (13 Dec 2017 23:30)    Pt was seen and examined at the bedside. He was observed to be lying down comfortably.   He was just extubated therefore feels tired, but does not endorse any difficulty breathing or cough.  VITAL SIGNS:  T(F): 99 (12-20-17 @ 09:04)  HR: 100 (12-20-17 @ 15:00)  BP: 128/55 (12-20-17 @ 15:00)  RR: 18 (12-20-17 @ 15:00)  SpO2: 99% (12-20-17 @ 15:00)  Wt(kg): --  I&O's Summary    19 Dec 2017 07:01  -  20 Dec 2017 07:00  --------------------------------------------------------  IN: 7209 mL / OUT: 851 mL / NET: 6358 mL    20 Dec 2017 07:01  -  20 Dec 2017 15:30  --------------------------------------------------------  IN: 1084 mL / OUT: 450 mL / NET: 634 mL      PHYSICAL EXAM:  Constitutional: NAD, well-groomed, well-developed, chorionically ill appearing  HEENT: PERRLA, EOMI, Normal Hearing, MMM  Neck: No LAD, No JVD  Back: Normal spine flexure, No CVA tenderness  Respiratory: CTAB  Cardiovascular: S1 and S2, RRR, no M/G/R  Gastrointestinal: abdomen covered with abdominal pads, just underwent surgery yesterday  Extremities: wound VAC in place at medial side of right thigh, on right knee, right heel, debrided wound filled with gauze on medio-inferior side of right knee, right knee warm, significantly tender to touch  Neurological: A/O x 3, no focal deficits          MEDICATIONS  (STANDING):  Dakins Solution - 1/4 Strength 1 Application(s) Topical daily  insulin lispro (HumaLOG) corrective regimen sliding scale   SubCutaneous every 6 hours  piperacillin/tazobactam IVPB. 3.375 Gram(s) IV Intermittent every 6 hours  sodium chloride 0.9%. 1000 milliLiter(s) (150 mL/Hr) IV Continuous <Continuous>    MEDICATIONS  (PRN):  HYDROmorphone  Injectable 1 milliGRAM(s) IV Push every 4 hours PRN Severe Pain (7 - 10)  HYDROmorphone  Injectable 0.5 milliGRAM(s) IV Push every 4 hours PRN Moderate Pain (4 - 6)      Allergies    No Known Allergies    Intolerances        LABS:                        10.1   9.2   )-----------( 263      ( 20 Dec 2017 04:54 )             30.3     12-20    139  |  108  |  13  ----------------------------<  143<H>  4.3   |  17<L>  |  1.47<H>    Ca    6.8<L>      20 Dec 2017 04:53  Phos  4.6     12-20  Mg     1.9     12-20    TPro  4.2<L>  /  Alb  2.2<L>  /  TBili  0.8  /  DBili  x   /  AST  40  /  ALT  61<H>  /  AlkPhos  51  12-19    PT/INR - ( 19 Dec 2017 15:04 )   PT: 13.5 sec;   INR: 1.21          PTT - ( 19 Dec 2017 15:04 )  PTT:30.8 sec      RADIOLOGY & ADDITIONAL TESTS:  < from: CT Angio Abd Aorta w/run-off w/ IV Cont (12.13.17 @ 22:52) >    IMPRESSION: Calcific atherosclerotic disease of the arteries. No aneurysm.    Occlusion of abdominal aorta below the level of the renal arteries with   collateral venous formation, likely chronic in nature.    Reconstitution of the right popliteal artery and left SFA through   collaterals.     Three-vessel runoff to the legs bilaterally.    Ulcerations of the right prepatellar soft tissues and anterolateral   aspect of the right shin.    Bilateral lower extremity soft tissue edema below the knees, right worse   than left. No soft tissue gas.     2.5 cm nonspecific left adrenalnodule.    Cholelithiasis.    Enlarged prostate.    < end of copied text >        Culture - Surgical Swab (12.15.17 @ 20:49)    Gram Stain:   Moderate Gram Positive Cocci in Pairs and Chains  Moderate White blood cells    -  Piperacillin/Tazobactam: S <=16    -  RIF- Rifampin: S <=1    -  Trimethoprim/Sulfamethoxazole: S <=0.5/9.5    -  Trimethoprim/Sulfamethoxazole: S <=2/38    -  Vancomycin: S 2    -  Vancomycin: S    -  Tobramycin: S <=4    -  Ampicillin: S    -  Ampicillin: R >16    -  Cefazolin: S <=4    -  Cefazolin: R >16    -  Ampicillin/Sulbactam: R >16/8    -  Ceftriaxone: S <=1    -  Clindamycin: S <=0.5    -  Clindamycin: S    -  Erythromycin: S <=0.5    -  Erythromycin: S    -  Ciprofloxacin: S <=1    -  Gentamicin: S <=4    -  Linezolid: S 4    -  Oxacillin: S 1    -  Penicillin: R >8    -  Penicillin: S    Specimen Source: .Surgical Swab right leg proximal wound    Culture Results:   Moderate Enterobacter cloacae  Moderate Staphylococcus aureus  Moderate Streptococcus agalactiae (Group B)    Organism Identification: Enterobacter cloacae  Staphylococcus aureus  Streptococcus agalactiae (Group B)    Organism: Enterobacter cloacae    Organism: Staphylococcus aureus    Organism: Streptococcus agalactiae (Group B)    Method Type: PRICILA    Method Type: KB    Method Type: PRICILA      A/P:   61 yo M with PAD and HTN admitted for infected arterial ulcers now s/p aortofemoral bypass on 12/19 and infected ulcers debridement by plastic surgery on 12/15. Surgical cx taken on 12/15 - patellar biopsy and surgical swab of medial patella grew Entereobacter cloacae, S.aureus and GBS pan-sensitive to cephalosporins. ID consulted for ABx choice.   Of note, intraoperatively on 12/15 plastic surgery consulted orthopedic surgery , due to concern for bone and bursa involvement. As per operative note and ortho no bursa involvement noted, however anterior patellar periosteum involved. Also noted bone patellar biopsy grew above mentioned microorganisms therefore this is osteomyelitis, not surprising given high ESR and CRP on admission prior to any procedure. As per ortho there was no concern for knee joint involvement.  Patient has been on vancomycin and Zosyn since admission, his Cr has been creeping up, however calculated GFR is around 70. Also with current antibiotics, MSSA has not been appropriately covered. Despite organisms being pan-sensistive, Enterobacter is an inducer of AmpC production therefore we recommend stopping Vancomycin and Zosyn and start cefepime 2grams Q8hrs.  Please obtain blood cultures.    Will follow. Plan discussed with primary team.

## 2017-12-20 NOTE — CONSULT NOTE ADULT - ATTENDING COMMENTS
I have reviewed the medical record, including laboratory and radiographic studies, interviewed and examined the patient and discussed the plan with Dr. Buckner, the ID Resident.  Agree with above. Will continue to follow with you - ID service.
Patient seen and examined with house-staff during bedside rounds.  Resident note read, including vitals, physical findings, laboratory data, and radiological reports.   Revisions included below.  Direct personal management at bed side and extensive interpretation of the data.  Plan was outlined and discussed in details with the housestaff.  Decision making of high complexity  ain management. Continue intubation overnight. Evaluate for extubation in  the morning. Monitor renal function. Monitor the pulses in the lower extremities

## 2017-12-20 NOTE — PROGRESS NOTE ADULT - ASSESSMENT
62M w/ HTN & PAD c/b RLE non-healing ulcers, & chronic infrarenal aortic occulsion now s/p aorto-bifem bypass w/ Hemashielf graft 12/19.     Neuro: Propofol gtt, fentanyl gtt   CV: Metoprolol 5q6, LVEF 70%; normal NST. Cards ([facility]) consult.   Pulm: Intubated- AC  GI/FEN: NPO, NS@150, Protonix  : Solorio to gravity; STEPHON- Cr 1.47 from 0.8 pre-op  ID: Vancomycin (12/13--holding 12/19 for STEPHON), Zosyn (12/13--).   Endo: ISS  Heme: Hold SQH.   PPx: No SCDs. Hold SQH. Duplex 12/16- No DVT. Protonix.  Lines: PIVs, L radial a-line (12/19--), RIJ (12/19--)  Drains: MEHRAN drains x 2 in bilateral marvin to continuous wall suction  Wound: Bilateral groin incisions w/ Prevena vacs. Midline incision w/ island dressing. R knee wound vac (to be changed by Plastics). BLE wounds w/ WTD Dakins dressing changes. Plastics ([facility]) consult.  PT: Bedrest

## 2017-12-20 NOTE — PROGRESS NOTE ADULT - SUBJECTIVE AND OBJECTIVE BOX
SICU DAILY PROGRESS NOTE      INTERVAL HPI / OVERNIGHT EVENTS:       MEDICATIONS:  ---NEURO-  fentaNYL   Infusion 0.5 MICROgram(s)/kG/Hr IV Continuous <Continuous>  propofol Infusion 5 MICROgram(s)/kG/Min IV Continuous <Continuous>  ---CV-  ---PULM-  ---GI/FEN-  pantoprazole  Injectable 40 milliGRAM(s) IV Push daily  sodium chloride 0.9%. 1000 milliLiter(s) IV Continuous <Continuous>  ----  ---ID-   piperacillin/tazobactam IVPB. 3.375 Gram(s) IV Intermittent every 6 hours  ---ENDO-  insulin lispro (HumaLOG) corrective regimen sliding scale   SubCutaneous every 6 hours  ---HEME-  ---OTHER-  chlorhexidine 0.12% Liquid 15 milliLiter(s) Swish and Spit two times a day  Dakins Solution - 1/4 Strength 1 Application(s) Topical two times a day          ANTIBIOTICS:      -     -     -    PRESSORS:     -    CENTRAL LINE:      Remove: NO     Indication: Venous access in critically ill patient.    ARTERIAL LINE:      Remove: NO     Indication: Hemodynamic monitoring in critically ill patient.     URINARY CATHETER:      Remove: NO     Indication: I/O monitoring in critically ill patient.     VOLUME STATUS:   I&O's Detail    19 Dec 2017 07:01  -  20 Dec 2017 07:00  --------------------------------------------------------  IN:    Albumin 5%  - 250 mL: 250 mL    fentaNYL  Infusion: 192 mL    IV PiggyBack: 700 mL    lactated ringers.: 150 mL    Packed Red Blood Cells: 350 mL    propofol Infusion: 317 mL    Sodium Chloride 0.9% IV Bolus: 3000 mL    sodium chloride 0.9%.: 2250 mL  Total IN: 7209 mL    OUT:    Drain: 140 mL    Indwelling Catheter - Urethral: 611 mL    Nasoenteral Tube: 100 mL  Total OUT: 851 mL  Total NET: 6358 mL      20 Dec 2017 07:01  -  20 Dec 2017 08:55  --------------------------------------------------------  IN:    fentaNYL  Infusion: 4 mL    sodium chloride 0.9%.: 150 mL  Total IN: 154 mL  OUT:  Total OUT: 0 mL  Total NET: 154 mL      VITALS:  ICU Vital Signs Last 24 Hrs  T(C): 37.9 (20 Dec 2017 05:29), Max: 37.9 (20 Dec 2017 05:29)  T(F): 100.3 (20 Dec 2017 05:29), Max: 100.3 (20 Dec 2017 05:29)  HR: 105 (20 Dec 2017 08:53) (56 - 105)  BP: 140/60 (20 Dec 2017 08:00) (88/47 - 195/81)  BP(mean): 94 (20 Dec 2017 08:00) (64 - 98)  ABP: 110/60 (20 Dec 2017 08:00) (94/43 - 166/73)  ABP(mean): 80 (20 Dec 2017 08:00) (60 - 82)  RR: 24 (20 Dec 2017 08:00) (3 - 24)  SpO2: 98% (20 Dec 2017 08:53) (97% - 100%)    Mode: CPAP with PS  FiO2: 40  PEEP: 5  PS: 8  ITime: 1  MAP: 7.8  PIP: 13    ABG - ( 19 Dec 2017 13:53 )  pH: 7.29  /  pCO2: 47    /  pO2: 212   / HCO3: 22    / Base Excess: -5.0  /  SaO2: x         CAPILLARY BLOOD GLUCOSE  148 mg/dL (20 Dec 2017 05:53)  108 mg/dL (19 Dec 2017 22:46)  144 mg/dL (19 Dec 2017 17:12)  193 mg/dL (19 Dec 2017 11:47)  133 mg/dL (19 Dec 2017 09:07)    PHYSICAL EXAM:  NEURO: Off sedation, A&Ox3, NAD, ANNE  CV: RRR, S1&S2  PULM: Intubated on ventilator CPAP; CTAB  ABD: Soft, midly distended, unable to assess tenderness d/t sedation. Midline incision island dressing w/ sanguinous stain at inferior aspect; no signs of hematoma formation. Bilateral groin incisions covered by Prevena vacs w/ good seal. MEHRAN drains to continuous wall suction in bilateral groin incisions- SS.   EXTR: WWP, peripheral edema, bilateral lower extremity chronic ulcers  VASC: R biphasic popliteal signal, R monophasic PT signal; L biphasic DP/PT signals      LABS:                        10.1   9.2   )-----------( 263      ( 20 Dec 2017 04:54 )             30.3     139  |  108  |  13  ----------------------------<  143<H>  4.3   |  17<L>  |  1.47<H>    Ca    6.8<L>      20 Dec 2017 04:53  Phos  4.6     12-20  Mg     1.9     12-20    TPro  4.2<L>  /  Alb  2.2<L>  /  TBili  0.8  /  DBili  x   /  AST  40  /  ALT  61<H>  /  AlkPhos  51  12-19    PT/INR - ( 19 Dec 2017 15:04 )   PT: 13.5 sec;   INR: 1.21     PTT - ( 19 Dec 2017 15:04 )  PTT:30.8 sec SICU DAILY PROGRESS NOTE      INTERVAL HPI / OVERNIGHT EVENTS:   12/20: Vanc trough 21.6.   O/N: Stopped metoprolol 5q6 per Vascular. Bolused 250cc Albumin 5% & 1L NS for low UOP, transfused 1u prbc, UOP increased to 30-60cc/hr, Cr increased to 1.43  12/19: Taken to OR 12/19 for aorto-bifemoral bypass w/ [id number] Hemashield graft. Suprarenal clamp time 8min. Palpable pulse distal to anastomosis at end of case. EBL 800cc. Given 5L LR & 2u PRBCs. UOP 350cc. Intermittently on pressors during case but arrived to SICU intubated, sedated & paralyzed but hemodynamically stable off pressors. given 2 liters bolus for low UO    MEDICATIONS:  ---NEURO-  fentaNYL   Infusion 0.5 MICROgram(s)/kG/Hr IV Continuous <Continuous>  propofol Infusion 5 MICROgram(s)/kG/Min IV Continuous <Continuous>  ---GI/FEN-  pantoprazole  Injectable 40 milliGRAM(s) IV Push daily  sodium chloride 0.9%. 1000 milliLiter(s) IV Continuous <Continuous>  ---ID-   piperacillin/tazobactam IVPB. 3.375 Gram(s) IV Intermittent every 6 hours  ---ENDO-  insulin lispro (HumaLOG) corrective regimen sliding scale   SubCutaneous every 6 hours  ---OTHER-  chlorhexidine 0.12% Liquid 15 milliLiter(s) Swish and Spit two times a day  Dakins Solution - 1/4 Strength 1 Application(s) Topical two times a day      ANTIBIOTICS: Zosyn 3.375q6    PRESSORS: NO    CENTRAL LINE: RIJ (12/19--)     Remove: NO     Indication: Venous access in critically ill patient.    ARTERIAL LINE: L radial (12/19--)     Remove: NO     Indication: Hemodynamic monitoring in critically ill patient.     URINARY CATHETER: YES (12/19--)     Remove: NO     Indication: I/O monitoring in critically ill patient.       VOLUME STATUS:   I&O's Detail    19 Dec 2017 07:01  -  20 Dec 2017 07:00  --------------------------------------------------------  IN:    Albumin 5%  - 250 mL: 250 mL    fentaNYL  Infusion: 192 mL    IV PiggyBack: 700 mL    lactated ringers.: 150 mL    Packed Red Blood Cells: 350 mL    propofol Infusion: 317 mL    Sodium Chloride 0.9% IV Bolus: 3000 mL    sodium chloride 0.9%.: 2250 mL  Total IN: 7209 mL    OUT:    Drain: 140 mL    Indwelling Catheter - Urethral: 611 mL    Nasoenteral Tube: 100 mL  Total OUT: 851 mL  Total NET: 6358 mL      20 Dec 2017 07:01  -  20 Dec 2017 08:55  --------------------------------------------------------  IN:    fentaNYL  Infusion: 4 mL    sodium chloride 0.9%.: 150 mL  Total IN: 154 mL  OUT:  Total OUT: 0 mL  Total NET: 154 mL      VITALS:  ICU Vital Signs Last 24 Hrs  T(C): 37.9 (20 Dec 2017 05:29), Max: 37.9 (20 Dec 2017 05:29)  T(F): 100.3 (20 Dec 2017 05:29), Max: 100.3 (20 Dec 2017 05:29)  HR: 105 (20 Dec 2017 08:53) (56 - 105)  BP: 140/60 (20 Dec 2017 08:00) (88/47 - 195/81)  BP(mean): 94 (20 Dec 2017 08:00) (64 - 98)  ABP: 110/60 (20 Dec 2017 08:00) (94/43 - 166/73)  ABP(mean): 80 (20 Dec 2017 08:00) (60 - 82)  RR: 24 (20 Dec 2017 08:00) (3 - 24)  SpO2: 98% (20 Dec 2017 08:53) (97% - 100%)    Mode: CPAP with PS  FiO2: 40  PEEP: 5  PS: 8  ITime: 1  MAP: 7.8  PIP: 13    ABG - ( 19 Dec 2017 13:53 )  pH: 7.29  /  pCO2: 47    /  pO2: 212   / HCO3: 22    / Base Excess: -5.0  /  SaO2: x         CAPILLARY BLOOD GLUCOSE  148 mg/dL (20 Dec 2017 05:53)  108 mg/dL (19 Dec 2017 22:46)  144 mg/dL (19 Dec 2017 17:12)  193 mg/dL (19 Dec 2017 11:47)  133 mg/dL (19 Dec 2017 09:07)    PHYSICAL EXAM:  NEURO: Sedated but arousable, RASS -2  CV: RRR, S1&S2  PULM: Intubated on ventilator AC settings, CTAB  ABD: Soft, midly distended, unable to assess tenderness d/t sedation. Midline incision island dressing w/ sanguinous stain at inferior aspect; no signs of hematoma formation. Bilateral groin incisions covered by Prevena vacs w/ good seal. MEHRAN drains to continuous wall suction in bilateral groin incisions- SS.   EXTR: WWP, peripheral edema, bilateral lower extremity chronic ulcers. Wound vac in place over R knee w/ good seal.   VASC: R biphasic popliteal signal, R monophasic PT signal; L biphasic DP/PT signals      LABS:                        10.1   9.2   )-----------( 263      ( 20 Dec 2017 04:54 )             30.3     139  |  108  |  13  ----------------------------<  143<H>  4.3   |  17<L>  |  1.47<H>    Ca    6.8<L>      20 Dec 2017 04:53  Phos  4.6     12-20  Mg     1.9     12-20    TPro  4.2<L>  /  Alb  2.2<L>  /  TBili  0.8  /  DBili  x   /  AST  40  /  ALT  61<H>  /  AlkPhos  51  12-19    PT/INR - ( 19 Dec 2017 15:04 )   PT: 13.5 sec;   INR: 1.21     PTT - ( 19 Dec 2017 15:04 )  PTT:30.8 sec

## 2017-12-21 LAB
ANION GAP SERPL CALC-SCNC: 13 MMOL/L — SIGNIFICANT CHANGE UP (ref 5–17)
ANION GAP SERPL CALC-SCNC: 15 MMOL/L — SIGNIFICANT CHANGE UP (ref 5–17)
BUN SERPL-MCNC: 16 MG/DL — SIGNIFICANT CHANGE UP (ref 7–23)
BUN SERPL-MCNC: 18 MG/DL — SIGNIFICANT CHANGE UP (ref 7–23)
CALCIUM SERPL-MCNC: 7.8 MG/DL — LOW (ref 8.4–10.5)
CALCIUM SERPL-MCNC: 8.4 MG/DL — SIGNIFICANT CHANGE UP (ref 8.4–10.5)
CHLORIDE SERPL-SCNC: 109 MMOL/L — HIGH (ref 96–108)
CHLORIDE SERPL-SCNC: 111 MMOL/L — HIGH (ref 96–108)
CO2 SERPL-SCNC: 20 MMOL/L — LOW (ref 22–31)
CO2 SERPL-SCNC: 21 MMOL/L — LOW (ref 22–31)
CREAT SERPL-MCNC: 1.86 MG/DL — HIGH (ref 0.5–1.3)
CREAT SERPL-MCNC: 1.93 MG/DL — HIGH (ref 0.5–1.3)
GLUCOSE BLDC GLUCOMTR-MCNC: 104 MG/DL — HIGH (ref 70–99)
GLUCOSE BLDC GLUCOMTR-MCNC: 127 MG/DL — HIGH (ref 70–99)
GLUCOSE BLDC GLUCOMTR-MCNC: 131 MG/DL — HIGH (ref 70–99)
GLUCOSE SERPL-MCNC: 135 MG/DL — HIGH (ref 70–99)
GLUCOSE SERPL-MCNC: 138 MG/DL — HIGH (ref 70–99)
HCT VFR BLD CALC: 26.3 % — LOW (ref 39–50)
HCT VFR BLD CALC: 27.8 % — LOW (ref 39–50)
HGB BLD-MCNC: 8.7 G/DL — LOW (ref 13–17)
HGB BLD-MCNC: 9 G/DL — LOW (ref 13–17)
MAGNESIUM SERPL-MCNC: 2.3 MG/DL — SIGNIFICANT CHANGE UP (ref 1.6–2.6)
MAGNESIUM SERPL-MCNC: 2.4 MG/DL — SIGNIFICANT CHANGE UP (ref 1.6–2.6)
MCHC RBC-ENTMCNC: 30.4 PG — SIGNIFICANT CHANGE UP (ref 27–34)
MCHC RBC-ENTMCNC: 31 PG — SIGNIFICANT CHANGE UP (ref 27–34)
MCHC RBC-ENTMCNC: 32.4 G/DL — SIGNIFICANT CHANGE UP (ref 32–36)
MCHC RBC-ENTMCNC: 33.1 G/DL — SIGNIFICANT CHANGE UP (ref 32–36)
MCV RBC AUTO: 93.6 FL — SIGNIFICANT CHANGE UP (ref 80–100)
MCV RBC AUTO: 93.9 FL — SIGNIFICANT CHANGE UP (ref 80–100)
PHOSPHATE SERPL-MCNC: 3.1 MG/DL — SIGNIFICANT CHANGE UP (ref 2.5–4.5)
PHOSPHATE SERPL-MCNC: 3.8 MG/DL — SIGNIFICANT CHANGE UP (ref 2.5–4.5)
PLATELET # BLD AUTO: 265 K/UL — SIGNIFICANT CHANGE UP (ref 150–400)
PLATELET # BLD AUTO: 317 K/UL — SIGNIFICANT CHANGE UP (ref 150–400)
POTASSIUM SERPL-MCNC: 4.2 MMOL/L — SIGNIFICANT CHANGE UP (ref 3.5–5.3)
POTASSIUM SERPL-MCNC: 4.3 MMOL/L — SIGNIFICANT CHANGE UP (ref 3.5–5.3)
POTASSIUM SERPL-SCNC: 4.2 MMOL/L — SIGNIFICANT CHANGE UP (ref 3.5–5.3)
POTASSIUM SERPL-SCNC: 4.3 MMOL/L — SIGNIFICANT CHANGE UP (ref 3.5–5.3)
RBC # BLD: 2.81 M/UL — LOW (ref 4.2–5.8)
RBC # BLD: 2.96 M/UL — LOW (ref 4.2–5.8)
RBC # FLD: 14.3 % — SIGNIFICANT CHANGE UP (ref 10.3–16.9)
RBC # FLD: 14.3 % — SIGNIFICANT CHANGE UP (ref 10.3–16.9)
SODIUM SERPL-SCNC: 142 MMOL/L — SIGNIFICANT CHANGE UP (ref 135–145)
SODIUM SERPL-SCNC: 147 MMOL/L — HIGH (ref 135–145)
WBC # BLD: 10.6 K/UL — HIGH (ref 3.8–10.5)
WBC # BLD: 9.8 K/UL — SIGNIFICANT CHANGE UP (ref 3.8–10.5)
WBC # FLD AUTO: 10.6 K/UL — HIGH (ref 3.8–10.5)
WBC # FLD AUTO: 9.8 K/UL — SIGNIFICANT CHANGE UP (ref 3.8–10.5)

## 2017-12-21 PROCEDURE — 71010: CPT | Mod: 26

## 2017-12-21 PROCEDURE — 99233 SBSQ HOSP IP/OBS HIGH 50: CPT | Mod: GC

## 2017-12-21 RX ORDER — LABETALOL HCL 100 MG
10 TABLET ORAL EVERY 6 HOURS
Qty: 0 | Refills: 0 | Status: DISCONTINUED | OUTPATIENT
Start: 2017-12-21 | End: 2017-12-29

## 2017-12-21 RX ORDER — HYDROMORPHONE HYDROCHLORIDE 2 MG/ML
1 INJECTION INTRAMUSCULAR; INTRAVENOUS; SUBCUTANEOUS ONCE
Qty: 0 | Refills: 0 | Status: DISCONTINUED | OUTPATIENT
Start: 2017-12-21 | End: 2017-12-21

## 2017-12-21 RX ORDER — ASPIRIN/CALCIUM CARB/MAGNESIUM 324 MG
300 TABLET ORAL DAILY
Qty: 0 | Refills: 0 | Status: DISCONTINUED | OUTPATIENT
Start: 2017-12-21 | End: 2017-12-24

## 2017-12-21 RX ORDER — METOPROLOL TARTRATE 50 MG
5 TABLET ORAL EVERY 6 HOURS
Qty: 0 | Refills: 0 | Status: DISCONTINUED | OUTPATIENT
Start: 2017-12-21 | End: 2017-12-21

## 2017-12-21 RX ORDER — HEPARIN SODIUM 5000 [USP'U]/ML
7500 INJECTION INTRAVENOUS; SUBCUTANEOUS EVERY 8 HOURS
Qty: 0 | Refills: 0 | Status: DISCONTINUED | OUTPATIENT
Start: 2017-12-21 | End: 2017-12-28

## 2017-12-21 RX ORDER — METOPROLOL TARTRATE 50 MG
5 TABLET ORAL EVERY 6 HOURS
Qty: 0 | Refills: 0 | Status: DISCONTINUED | OUTPATIENT
Start: 2017-12-21 | End: 2017-12-22

## 2017-12-21 RX ORDER — CEFEPIME 1 G/1
2000 INJECTION, POWDER, FOR SOLUTION INTRAMUSCULAR; INTRAVENOUS EVERY 12 HOURS
Qty: 0 | Refills: 0 | Status: DISCONTINUED | OUTPATIENT
Start: 2017-12-21 | End: 2017-12-22

## 2017-12-21 RX ORDER — SODIUM CHLORIDE 9 MG/ML
1000 INJECTION INTRAMUSCULAR; INTRAVENOUS; SUBCUTANEOUS
Qty: 0 | Refills: 0 | Status: DISCONTINUED | OUTPATIENT
Start: 2017-12-21 | End: 2017-12-22

## 2017-12-21 RX ORDER — LABETALOL HCL 100 MG
10 TABLET ORAL ONCE
Qty: 0 | Refills: 0 | Status: COMPLETED | OUTPATIENT
Start: 2017-12-21 | End: 2017-12-21

## 2017-12-21 RX ADMIN — Medication 5 MILLIGRAM(S): at 12:16

## 2017-12-21 RX ADMIN — HYDROMORPHONE HYDROCHLORIDE 1 MILLIGRAM(S): 2 INJECTION INTRAMUSCULAR; INTRAVENOUS; SUBCUTANEOUS at 11:15

## 2017-12-21 RX ADMIN — Medication 10 MILLIGRAM(S): at 23:44

## 2017-12-21 RX ADMIN — HYDROMORPHONE HYDROCHLORIDE 1 MILLIGRAM(S): 2 INJECTION INTRAMUSCULAR; INTRAVENOUS; SUBCUTANEOUS at 07:02

## 2017-12-21 RX ADMIN — HYDROMORPHONE HYDROCHLORIDE 1 MILLIGRAM(S): 2 INJECTION INTRAMUSCULAR; INTRAVENOUS; SUBCUTANEOUS at 22:00

## 2017-12-21 RX ADMIN — Medication 5 MILLIGRAM(S): at 18:39

## 2017-12-21 RX ADMIN — HYDROMORPHONE HYDROCHLORIDE 1 MILLIGRAM(S): 2 INJECTION INTRAMUSCULAR; INTRAVENOUS; SUBCUTANEOUS at 10:49

## 2017-12-21 RX ADMIN — Medication 5 MILLIGRAM(S): at 23:40

## 2017-12-21 RX ADMIN — HYDROMORPHONE HYDROCHLORIDE 1 MILLIGRAM(S): 2 INJECTION INTRAMUSCULAR; INTRAVENOUS; SUBCUTANEOUS at 03:00

## 2017-12-21 RX ADMIN — Medication 2.5 MILLIGRAM(S): at 06:28

## 2017-12-21 RX ADMIN — CEFEPIME 100 MILLIGRAM(S): 1 INJECTION, POWDER, FOR SOLUTION INTRAMUSCULAR; INTRAVENOUS at 06:27

## 2017-12-21 RX ADMIN — HEPARIN SODIUM 7500 UNIT(S): 5000 INJECTION INTRAVENOUS; SUBCUTANEOUS at 14:56

## 2017-12-21 RX ADMIN — HYDROMORPHONE HYDROCHLORIDE 1 MILLIGRAM(S): 2 INJECTION INTRAMUSCULAR; INTRAVENOUS; SUBCUTANEOUS at 20:33

## 2017-12-21 RX ADMIN — Medication 300 MILLIGRAM(S): at 12:17

## 2017-12-21 RX ADMIN — HYDROMORPHONE HYDROCHLORIDE 1 MILLIGRAM(S): 2 INJECTION INTRAMUSCULAR; INTRAVENOUS; SUBCUTANEOUS at 02:32

## 2017-12-21 RX ADMIN — HYDROMORPHONE HYDROCHLORIDE 1 MILLIGRAM(S): 2 INJECTION INTRAMUSCULAR; INTRAVENOUS; SUBCUTANEOUS at 09:05

## 2017-12-21 RX ADMIN — SODIUM CHLORIDE 75 MILLILITER(S): 9 INJECTION INTRAMUSCULAR; INTRAVENOUS; SUBCUTANEOUS at 18:50

## 2017-12-21 RX ADMIN — HEPARIN SODIUM 7500 UNIT(S): 5000 INJECTION INTRAVENOUS; SUBCUTANEOUS at 21:54

## 2017-12-21 RX ADMIN — Medication 2.5 MILLIGRAM(S): at 00:34

## 2017-12-21 RX ADMIN — HYDROMORPHONE HYDROCHLORIDE 0.5 MILLIGRAM(S): 2 INJECTION INTRAMUSCULAR; INTRAVENOUS; SUBCUTANEOUS at 00:01

## 2017-12-21 RX ADMIN — Medication 10 MILLIGRAM(S): at 16:16

## 2017-12-21 RX ADMIN — Medication 1 APPLICATION(S): at 12:19

## 2017-12-21 RX ADMIN — CEFEPIME 100 MILLIGRAM(S): 1 INJECTION, POWDER, FOR SOLUTION INTRAMUSCULAR; INTRAVENOUS at 18:39

## 2017-12-21 NOTE — PHYSICAL THERAPY INITIAL EVALUATION ADULT - GENERAL OBSERVATIONS, REHAB EVAL
patient received semi-supine with no acute distress. +EKG, +NGT to suction, +vac x 2 right LE, +candelario, +central line, +kelsie, +zflows, +NC 2L/min, bilateral LE edema (right>left)

## 2017-12-21 NOTE — PROGRESS NOTE ADULT - SUBJECTIVE AND OBJECTIVE BOX
ID FOLLOW UP NOTE    Pt was seen and examined at the bedside. He was observed to be lying down comfortably.     VITAL SIGNS:  T(F): 99.7 (12-21-17 @ 10:06)  HR: 98 (12-21-17 @ 09:00)  BP: 155/51 (12-20-17 @ 17:00)  RR: 19 (12-21-17 @ 09:00)  SpO2: 98% (12-21-17 @ 09:00)  Wt(kg): --  I&O's Summary    20 Dec 2017 07:01  -  21 Dec 2017 07:00  --------------------------------------------------------  IN: 4184 mL / OUT: 2560 mL / NET: 1624 mL    21 Dec 2017 07:01  -  21 Dec 2017 11:16  --------------------------------------------------------  IN: 450 mL / OUT: 950 mL / NET: -500 mL      PHYSICAL EXAM:  Constitutional: NAD, well-groomed, well-developed  HEENT: PERRLA, EOMI, Normal Hearing, MMM  Neck: No LAD, No JVD  Back: Normal spine flexure, No CVA tenderness  Respiratory: CTAB  Cardiovascular: S1 and S2, RRR, no M/G/R  Gastrointestinal: BS+, soft, NT/ND  Extremities: No peripheral edema  Vascular: 2+ peripheral pulses  Neurological: A/O x 3, no focal deficits  Psychiatric: Normal mood, normal affect  Musculoskeletal: 5/5 strength b/l upper and lower extremities  Skin: No rashes        MEDICATIONS  (STANDING):  aspirin Suppository 300 milliGRAM(s) Rectal daily  cefepime  IVPB 2000 milliGRAM(s) IV Intermittent every 12 hours  Dakins Solution - 1/4 Strength 1 Application(s) Topical daily  diphenhydrAMINE   Injectable 50 milliGRAM(s) IV Push once  heparin  Injectable 7500 Unit(s) SubCutaneous every 8 hours  insulin lispro (HumaLOG) corrective regimen sliding scale   SubCutaneous every 6 hours  metoprolol    tartrate Injectable 5 milliGRAM(s) IV Push every 6 hours  sodium chloride 0.9%. 1000 milliLiter(s) (75 mL/Hr) IV Continuous <Continuous>    MEDICATIONS  (PRN):  HYDROmorphone  Injectable 1 milliGRAM(s) IV Push every 4 hours PRN Severe Pain (7 - 10)  HYDROmorphone  Injectable 0.5 milliGRAM(s) IV Push every 4 hours PRN Moderate Pain (4 - 6)      Allergies    No Known Allergies    Intolerances        LABS:                        8.7    9.8   )-----------( 265      ( 21 Dec 2017 05:16 )             26.3     12-21    142  |  109<H>  |  16  ----------------------------<  138<H>  4.2   |  20<L>  |  1.93<H>    Ca    7.8<L>      21 Dec 2017 05:19  Phos  3.8     12-21  Mg     2.3     12-21    TPro  4.2<L>  /  Alb  2.2<L>  /  TBili  0.8  /  DBili  x   /  AST  40  /  ALT  61<H>  /  AlkPhos  51  12-19    PT/INR - ( 19 Dec 2017 15:04 )   PT: 13.5 sec;   INR: 1.21          PTT - ( 19 Dec 2017 15:04 )  PTT:30.8 sec      RADIOLOGY & ADDITIONAL TESTS: ID FOLLOW UP NOTE  Pt was seen and examined at the bedside. He was observed to be lying down comfortably. He reports feeling much better.    VITAL SIGNS:  T(F): 99.7 (12-21-17 @ 10:06)  HR: 98 (12-21-17 @ 09:00)  BP: 155/51 (12-20-17 @ 17:00)  RR: 19 (12-21-17 @ 09:00)  SpO2: 98% (12-21-17 @ 09:00)  Wt(kg): --  I&O's Summary    20 Dec 2017 07:01  -  21 Dec 2017 07:00  --------------------------------------------------------  IN: 4184 mL / OUT: 2560 mL / NET: 1624 mL    21 Dec 2017 07:01  -  21 Dec 2017 11:16  --------------------------------------------------------  IN: 450 mL / OUT: 950 mL / NET: -500 mL      PHYSICAL EXAM:          MEDICATIONS  (STANDING):  aspirin Suppository 300 milliGRAM(s) Rectal daily  cefepime  IVPB 2000 milliGRAM(s) IV Intermittent every 12 hours  Dakins Solution - 1/4 Strength 1 Application(s) Topical daily  diphenhydrAMINE   Injectable 50 milliGRAM(s) IV Push once  heparin  Injectable 7500 Unit(s) SubCutaneous every 8 hours  insulin lispro (HumaLOG) corrective regimen sliding scale   SubCutaneous every 6 hours  metoprolol    tartrate Injectable 5 milliGRAM(s) IV Push every 6 hours  sodium chloride 0.9%. 1000 milliLiter(s) (75 mL/Hr) IV Continuous <Continuous>    MEDICATIONS  (PRN):  HYDROmorphone  Injectable 1 milliGRAM(s) IV Push every 4 hours PRN Severe Pain (7 - 10)  HYDROmorphone  Injectable 0.5 milliGRAM(s) IV Push every 4 hours PRN Moderate Pain (4 - 6)      Allergies    No Known Allergies    Intolerances        LABS:                        8.7    9.8   )-----------( 265      ( 21 Dec 2017 05:16 )             26.3     12-21    142  |  109<H>  |  16  ----------------------------<  138<H>  4.2   |  20<L>  |  1.93<H>    Ca    7.8<L>      21 Dec 2017 05:19  Phos  3.8     12-21  Mg     2.3     12-21    TPro  4.2<L>  /  Alb  2.2<L>  /  TBili  0.8  /  DBili  x   /  AST  40  /  ALT  61<H>  /  AlkPhos  51  12-19    PT/INR - ( 19 Dec 2017 15:04 )   PT: 13.5 sec;   INR: 1.21          PTT - ( 19 Dec 2017 15:04 )  PTT:30.8 sec      RADIOLOGY & ADDITIONAL TESTS: ID FOLLOW UP NOTE  Pt was seen and examined at the bedside. He was observed to be lying down comfortably. He reports feeling much better.    VITAL SIGNS:  T(F): 99.7 (12-21-17 @ 10:06)  HR: 98 (12-21-17 @ 09:00)  BP: 155/51 (12-20-17 @ 17:00)  RR: 19 (12-21-17 @ 09:00)  SpO2: 98% (12-21-17 @ 09:00)  Wt(kg): --  I&O's Summary    20 Dec 2017 07:01  -  21 Dec 2017 07:00  --------------------------------------------------------  IN: 4184 mL / OUT: 2560 mL / NET: 1624 mL    21 Dec 2017 07:01  -  21 Dec 2017 11:16  --------------------------------------------------------  IN: 450 mL / OUT: 950 mL / NET: -500 mL      PHYSICAL EXAM:  Constitutional: NAD, well-groomed, well-developed, chorionically ill appearing  HEENT: PERRLA, EOMI, Normal Hearing, MMM  Neck: No LAD, No JVD  Back: Normal spine flexure, No CVA tenderness  Respiratory: CTAB  Cardiovascular: S1 and S2, RRR, no M/G/R  Gastrointestinal: abdomen covered with abdominal pads  Extremities: wound VAC in place at medial side of right thigh, on right knee, right heel, debrided wound filled with gauze on medio-inferior side of right knee, right knee warm, significantly tender to touch  Neurological: A/O x 3, no focal deficits        MEDICATIONS  (STANDING):  aspirin Suppository 300 milliGRAM(s) Rectal daily  cefepime  IVPB 2000 milliGRAM(s) IV Intermittent every 12 hours  Dakins Solution - 1/4 Strength 1 Application(s) Topical daily  diphenhydrAMINE   Injectable 50 milliGRAM(s) IV Push once  heparin  Injectable 7500 Unit(s) SubCutaneous every 8 hours  insulin lispro (HumaLOG) corrective regimen sliding scale   SubCutaneous every 6 hours  metoprolol    tartrate Injectable 5 milliGRAM(s) IV Push every 6 hours  sodium chloride 0.9%. 1000 milliLiter(s) (75 mL/Hr) IV Continuous <Continuous>    MEDICATIONS  (PRN):  HYDROmorphone  Injectable 1 milliGRAM(s) IV Push every 4 hours PRN Severe Pain (7 - 10)  HYDROmorphone  Injectable 0.5 milliGRAM(s) IV Push every 4 hours PRN Moderate Pain (4 - 6)      Allergies    No Known Allergies    Intolerances        LABS:                        8.7    9.8   )-----------( 265      ( 21 Dec 2017 05:16 )             26.3     12-21    142  |  109<H>  |  16  ----------------------------<  138<H>  4.2   |  20<L>  |  1.93<H>    Ca    7.8<L>      21 Dec 2017 05:19  Phos  3.8     12-21  Mg     2.3     12-21    TPro  4.2<L>  /  Alb  2.2<L>  /  TBili  0.8  /  DBili  x   /  AST  40  /  ALT  61<H>  /  AlkPhos  51  12-19    PT/INR - ( 19 Dec 2017 15:04 )   PT: 13.5 sec;   INR: 1.21          PTT - ( 19 Dec 2017 15:04 )  PTT:30.8 sec        Culture - Blood (collected 20 Dec 2017 19:43)  Source: .Blood Blood  Preliminary Report (21 Dec 2017 08:01):    No growth at 12 hours    Culture - Blood (collected 20 Dec 2017 19:43)  Source: .Blood Blood  Preliminary Report (21 Dec 2017 08:01):    No growth at 12 hours    Culture - Surgical Swab (12.15.17 @ 20:49)    -  Oxacillin: S 1    -  Piperacillin/Tazobactam: S <=16    -  RIF- Rifampin: S <=1    -  Tobramycin: S <=4    -  Trimethoprim/Sulfamethoxazole: S <=0.5/9.5    -  Trimethoprim/Sulfamethoxazole: S <=2/38    -  Vancomycin: S 2    -  Vancomycin: S    -  Ampicillin: S    -  Ampicillin: R >16    -  Ampicillin/Sulbactam: R >16/8    -  Cefazolin: S <=4    -  Cefazolin: R >16    -  Ceftriaxone: S <=1    -  Ciprofloxacin: S <=1    -  Clindamycin: S <=0.5    -  Clindamycin: S    -  Erythromycin: S <=0.5    -  Erythromycin: S    -  Gentamicin: S <=4    -  Linezolid: S 4    -  Penicillin: R >8    -  Penicillin: S    Gram Stain:   Moderate Gram Positive Cocci in Pairs and Chains  Moderate White blood cells    Specimen Source: .Surgical Swab right leg proximal wound    Culture Results:   Moderate Enterobacter cloacae  Moderate Staphylococcus aureus  Moderate Streptococcus agalactiae (Group B)    Organism Identification: Enterobacter cloacae  Staphylococcus aureus  Streptococcus agalactiae (Group B)    Organism: Enterobacter cloacae    Organism: Staphylococcus aureus    Organism: Streptococcus agalactiae (Group B)    Method Type: PRICILA    Method Type: KB    Method Type: PRICILA ID FOLLOW UP NOTE  Pt was seen and examined at the bedside. He was observed to be lying down comfortably. He reports feeling much better.    VITAL SIGNS:  T(F): 99.7 (12-21-17 @ 10:06)  HR: 98 (12-21-17 @ 09:00)  BP: 155/51 (12-20-17 @ 17:00)  RR: 19 (12-21-17 @ 09:00)  SpO2: 98% (12-21-17 @ 09:00)  Wt(kg): --  I&O's Summary    20 Dec 2017 07:01  -  21 Dec 2017 07:00  --------------------------------------------------------  IN: 4184 mL / OUT: 2560 mL / NET: 1624 mL    21 Dec 2017 07:01  -  21 Dec 2017 11:16  --------------------------------------------------------  IN: 450 mL / OUT: 950 mL / NET: -500 mL      PHYSICAL EXAM:  Constitutional: NAD, well-groomed, well-developed, chorionically ill appearing  HEENT: PERRLA, EOMI, Normal Hearing, MMM  Neck: No LAD, No JVD  Back: Normal spine flexure, No CVA tenderness  Respiratory: CTAB  Cardiovascular: S1 and S2, RRR, no M/G/R  Gastrointestinal: abdomen covered with abdominal pads  Extremities: wound VAC in place at medial side of right thigh, on right knee, right heel, debrided wound filled with gauze on medio-inferior side of right knee, right knee warm, significantly tender to touch  Neurological: A/O x 3, no focal deficits        MEDICATIONS  (STANDING):  aspirin Suppository 300 milliGRAM(s) Rectal daily  cefepime  IVPB 2000 milliGRAM(s) IV Intermittent every 12 hours  Dakins Solution - 1/4 Strength 1 Application(s) Topical daily  diphenhydrAMINE   Injectable 50 milliGRAM(s) IV Push once  heparin  Injectable 7500 Unit(s) SubCutaneous every 8 hours  insulin lispro (HumaLOG) corrective regimen sliding scale   SubCutaneous every 6 hours  metoprolol    tartrate Injectable 5 milliGRAM(s) IV Push every 6 hours  sodium chloride 0.9%. 1000 milliLiter(s) (75 mL/Hr) IV Continuous <Continuous>    MEDICATIONS  (PRN):  HYDROmorphone  Injectable 1 milliGRAM(s) IV Push every 4 hours PRN Severe Pain (7 - 10)  HYDROmorphone  Injectable 0.5 milliGRAM(s) IV Push every 4 hours PRN Moderate Pain (4 - 6)      Allergies    No Known Allergies    Intolerances        LABS:                        8.7    9.8   )-----------( 265      ( 21 Dec 2017 05:16 )             26.3     12-21    142  |  109<H>  |  16  ----------------------------<  138<H>  4.2   |  20<L>  |  1.93<H>    Ca    7.8<L>      21 Dec 2017 05:19  Phos  3.8     12-21  Mg     2.3     12-21    TPro  4.2<L>  /  Alb  2.2<L>  /  TBili  0.8  /  DBili  x   /  AST  40  /  ALT  61<H>  /  AlkPhos  51  12-19    PT/INR - ( 19 Dec 2017 15:04 )   PT: 13.5 sec;   INR: 1.21          PTT - ( 19 Dec 2017 15:04 )  PTT:30.8 sec        Culture - Blood (collected 20 Dec 2017 19:43)  Source: .Blood Blood  Preliminary Report (21 Dec 2017 08:01):    No growth at 12 hours    Culture - Blood (collected 20 Dec 2017 19:43)  Source: .Blood Blood  Preliminary Report (21 Dec 2017 08:01):    No growth at 12 hours    Culture - Surgical Swab (12.15.17 @ 20:49)    -  Oxacillin: S 1    -  Piperacillin/Tazobactam: S <=16    -  RIF- Rifampin: S <=1    -  Tobramycin: S <=4    -  Trimethoprim/Sulfamethoxazole: S <=0.5/9.5    -  Trimethoprim/Sulfamethoxazole: S <=2/38    -  Vancomycin: S 2    -  Vancomycin: S    -  Ampicillin: S    -  Ampicillin: R >16    -  Ampicillin/Sulbactam: R >16/8    -  Cefazolin: S <=4    -  Cefazolin: R >16    -  Ceftriaxone: S <=1    -  Ciprofloxacin: S <=1    -  Clindamycin: S <=0.5    -  Clindamycin: S    -  Erythromycin: S <=0.5    -  Erythromycin: S    -  Gentamicin: S <=4    -  Linezolid: S 4    -  Penicillin: R >8    -  Penicillin: S    Gram Stain:   Moderate Gram Positive Cocci in Pairs and Chains  Moderate White blood cells    Specimen Source: .Surgical Swab right leg proximal wound    Culture Results:   Moderate Enterobacter cloacae  Moderate Staphylococcus aureus  Moderate Streptococcus agalactiae (Group B)    Organism Identification: Enterobacter cloacae  Staphylococcus aureus  Streptococcus agalactiae (Group B)    Organism: Enterobacter cloacae    Organism: Staphylococcus aureus    Organism: Streptococcus agalactiae (Group B)    Method Type: PRICILA    Method Type: KB    Method Type: PRICILA      A/P:   61 yo M with PAD and HTN admitted for infected arterial ulcers now s/p aortofemoral bypass on 12/19 and infected ulcers debridement by plastic surgery on 12/15. Surgical cx taken on 12/15 - patellar biopsy and surgical swab of medial patella grew Entereobacter cloacae, S.aureus and GBS pan-sensitive to cephalosporins. Pt started on cefepime 2 gram q8hrs yesterday, re-dosed today to q12hrs due to increase in Cr again. Agree with changes.  Given cultures have speciated, we know we are treating OM and infected ulcers recommend 6 weeks of cefepime via PICC line since day of debridement, last day of Abx would be 1/25/2018.  Please arrange outpatient follow up with  within 2 weeks of discharge at [phone] option 2.  He will need weekly CBC and CMP as outpatient while on IV abx.    Thank you for this consult. Please re-call if any further ID input is desired.    Case to be discussed with attending. Please do not place any orders or make changes based on this note. ID FOLLOW UP NOTE  Pt was seen and examined at the bedside. He was observed to be lying down comfortably. He reports feeling much better.    VITAL SIGNS:  T(F): 99.7 (12-21-17 @ 10:06)  HR: 98 (12-21-17 @ 09:00)  BP: 155/51 (12-20-17 @ 17:00)  RR: 19 (12-21-17 @ 09:00)  SpO2: 98% (12-21-17 @ 09:00)  Wt(kg): --  I&O's Summary    20 Dec 2017 07:01  -  21 Dec 2017 07:00  --------------------------------------------------------  IN: 4184 mL / OUT: 2560 mL / NET: 1624 mL    21 Dec 2017 07:01  -  21 Dec 2017 11:16  --------------------------------------------------------  IN: 450 mL / OUT: 950 mL / NET: -500 mL      PHYSICAL EXAM:  Constitutional: NAD, well-groomed, well-developed, chorionically ill appearing  HEENT: PERRLA, EOMI, Normal Hearing, MMM  Neck: No LAD, No JVD  Back: Normal spine flexure, No CVA tenderness  Respiratory: CTAB  Cardiovascular: S1 and S2, RRR, no M/G/R  Gastrointestinal: abdomen covered with abdominal pads  Extremities: wound VAC in place at medial side of right thigh, on right knee, right heel, debrided wound filled with gauze on medio-inferior side of right knee, right knee warm, significantly tender to touch  Neurological: A/O x 3, no focal deficits        MEDICATIONS  (STANDING):  aspirin Suppository 300 milliGRAM(s) Rectal daily  cefepime  IVPB 2000 milliGRAM(s) IV Intermittent every 12 hours  Dakins Solution - 1/4 Strength 1 Application(s) Topical daily  diphenhydrAMINE   Injectable 50 milliGRAM(s) IV Push once  heparin  Injectable 7500 Unit(s) SubCutaneous every 8 hours  insulin lispro (HumaLOG) corrective regimen sliding scale   SubCutaneous every 6 hours  metoprolol    tartrate Injectable 5 milliGRAM(s) IV Push every 6 hours  sodium chloride 0.9%. 1000 milliLiter(s) (75 mL/Hr) IV Continuous <Continuous>    MEDICATIONS  (PRN):  HYDROmorphone  Injectable 1 milliGRAM(s) IV Push every 4 hours PRN Severe Pain (7 - 10)  HYDROmorphone  Injectable 0.5 milliGRAM(s) IV Push every 4 hours PRN Moderate Pain (4 - 6)      Allergies    No Known Allergies    Intolerances        LABS:                        8.7    9.8   )-----------( 265      ( 21 Dec 2017 05:16 )             26.3     12-21    142  |  109<H>  |  16  ----------------------------<  138<H>  4.2   |  20<L>  |  1.93<H>    Ca    7.8<L>      21 Dec 2017 05:19  Phos  3.8     12-21  Mg     2.3     12-21    TPro  4.2<L>  /  Alb  2.2<L>  /  TBili  0.8  /  DBili  x   /  AST  40  /  ALT  61<H>  /  AlkPhos  51  12-19    PT/INR - ( 19 Dec 2017 15:04 )   PT: 13.5 sec;   INR: 1.21          PTT - ( 19 Dec 2017 15:04 )  PTT:30.8 sec        Culture - Blood (collected 20 Dec 2017 19:43)  Source: .Blood Blood  Preliminary Report (21 Dec 2017 08:01):    No growth at 12 hours    Culture - Blood (collected 20 Dec 2017 19:43)  Source: .Blood Blood  Preliminary Report (21 Dec 2017 08:01):    No growth at 12 hours    Culture - Surgical Swab (12.15.17 @ 20:49)    -  Oxacillin: S 1    -  Piperacillin/Tazobactam: S <=16    -  RIF- Rifampin: S <=1    -  Tobramycin: S <=4    -  Trimethoprim/Sulfamethoxazole: S <=0.5/9.5    -  Trimethoprim/Sulfamethoxazole: S <=2/38    -  Vancomycin: S 2    -  Vancomycin: S    -  Ampicillin: S    -  Ampicillin: R >16    -  Ampicillin/Sulbactam: R >16/8    -  Cefazolin: S <=4    -  Cefazolin: R >16    -  Ceftriaxone: S <=1    -  Ciprofloxacin: S <=1    -  Clindamycin: S <=0.5    -  Clindamycin: S    -  Erythromycin: S <=0.5    -  Erythromycin: S    -  Gentamicin: S <=4    -  Linezolid: S 4    -  Penicillin: R >8    -  Penicillin: S    Gram Stain:   Moderate Gram Positive Cocci in Pairs and Chains  Moderate White blood cells    Specimen Source: .Surgical Swab right leg proximal wound    Culture Results:   Moderate Enterobacter cloacae  Moderate Staphylococcus aureus  Moderate Streptococcus agalactiae (Group B)    Organism Identification: Enterobacter cloacae  Staphylococcus aureus  Streptococcus agalactiae (Group B)    Organism: Enterobacter cloacae    Organism: Staphylococcus aureus    Organism: Streptococcus agalactiae (Group B)    Method Type: PRICILA    Method Type: KB    Method Type: PRICILA      A/P:   63 yo M with PAD and HTN admitted for infected arterial ulcers now s/p aortofemoral bypass on 12/19 and infected ulcers debridement by plastic surgery on 12/15. Surgical cx taken on 12/15 - patellar biopsy and surgical swab of medial patella grew Entereobacter cloacae, S.aureus and GBS pan-sensitive to cephalosporins. Pt started on cefepime 2 gram q8hrs yesterday, re-dosed today to q12hrs due to increase in Cr again. Agree with changes.  Given cultures have speciated, we know we are treating OM and infected ulcers recommend 6 weeks of cefepime via PICC line since day of debridement, last day of Abx would be 1/25/2018.  Please arrange outpatient follow up with  within 2 weeks of discharge at [phone] option 2.  He will need weekly CBC, CMP, ESR and CRP as outpatient while on IV abx.    Thank you for this consult. Please re-call if any further ID input is desired.

## 2017-12-21 NOTE — PROGRESS NOTE ADULT - ASSESSMENT
62M w/ HTN & PAD c/b RLE non-healing ulcers, & chronic infrarenal aortic occulsion now s/p aorto-bifem bypass w/ Hemashielf graft 12/19.     Neuro: fentanyl gtt   CV: LVEF 70%; normal NST. Metoprolol 5q6. Cards ([facility]) consult.   Pulm: extubated  GI/FEN: NPO, NS@75, Protonix  : Solorio to gravity; STEPHON- Cr 1.93 from 0.8 pre-op  ID: OM: Cefepime 2q12 (12/20--) // D/c: Vancomycin (12/13-19), Zosyn (12/13-20). ID service ([facility]) consult.   Endo: ISS  Heme: Hold SQH.   PPx: No SCDs. Hold SQH. Duplex 12/16- No DVT. Protonix.  Lines: PIVs, L radial a-line (12/19--), RIJ (12/19--)  Drains: MEHRAN drains x 2 in bilateral marvin to continuous wall suction  Wound: Bilateral groin incisions w/ Prevena vacs. Midline incision w/ island dressing. R knee wound vac (to be changed by Plastics). BLE wounds w/ WTD Dakins dressing changes by RN. Plastics ([facility]) consult.  PT: Bedrest

## 2017-12-21 NOTE — PROGRESS NOTE ADULT - ATTENDING COMMENTS
I have reviewed the medical record, including laboratory and radiographic studies, interviewed and examined the patient and discussed the plan with Dr. Buckner, the ID Resident.  Agree with above. Please recall if further ID input is desired - ID service.

## 2017-12-21 NOTE — PHYSICAL THERAPY INITIAL EVALUATION ADULT - ADDITIONAL COMMENTS
used a cane for 3 weeks due to pain from ulcers right LE, prior to that did not use assistive device.

## 2017-12-21 NOTE — PROGRESS NOTE ADULT - SUBJECTIVE AND OBJECTIVE BOX
SUBJECTIVE:  Doing well.   No overnight events.     OBJECTIVE:     ** VITAL SIGNS / I&O's **    Vital Signs Last 24 Hrs  T(C): 37.6 (21 Dec 2017 10:06), Max: 37.9 (20 Dec 2017 21:43)  T(F): 99.7 (21 Dec 2017 10:06), Max: 100.2 (20 Dec 2017 21:43)  HR: 98 (21 Dec 2017 09:00) (88 - 110)  BP: 155/51 (20 Dec 2017 17:00) (128/55 - 200/72)  BP(mean): 84 (20 Dec 2017 17:00) (78 - 119)  RR: 19 (21 Dec 2017 09:00) (4 - 28)  SpO2: 98% (21 Dec 2017 09:00) (91% - 100%)      20 Dec 2017 07:01  -  21 Dec 2017 07:00  --------------------------------------------------------  IN:    fentaNYL  Infusion: 34 mL    IV PiggyBack: 50 mL    sodium chloride 0.9%: 3600 mL    Sodium Chloride 0.9% IV Bolus: 500 mL  Total IN: 4184 mL    OUT:    Drain: 120 mL    Indwelling Catheter - Urethral: 2240 mL    Nasoenteral Tube: 200 mL  Total OUT: 2560 mL    Total NET: 1624 mL      21 Dec 2017 07:01  -  21 Dec 2017 11:20  --------------------------------------------------------  IN:    sodium chloride 0.9%: 300 mL    sodium chloride 0.9%.: 150 mL  Total IN: 450 mL    OUT:    Indwelling Catheter - Urethral: 750 mL    Nasoenteral Tube: 200 mL  Total OUT: 950 mL    Total NET: -500 mL          ** PHYSICAL EXAM **    -- CONSTITUTIONAL: Alert, Awake. NAD. NGT in place  -- RESPIRATORY: unlabored breathing, no respiratory distress  -- RLE: wound vac in place wound on patella and posterior lower leg, WTD dressing changed- edges of wound and wound base necrotic      ** LABS **                          8.7    9.8   )-----------( 265      ( 21 Dec 2017 05:16 )             26.3     21 Dec 2017 05:19    142    |  109    |  16     ----------------------------<  138    4.2     |  20     |  1.93     Ca    7.8        21 Dec 2017 05:19  Phos  3.8       21 Dec 2017 05:19  Mg     2.3       21 Dec 2017 05:19    TPro  4.2    /  Alb  2.2    /  TBili  0.8    /  DBili  x      /  AST  40     /  ALT  61     /  AlkPhos  51     19 Dec 2017 19:12    PT/INR - ( 19 Dec 2017 15:04 )   PT: 13.5 sec;   INR: 1.21          PTT - ( 19 Dec 2017 15:04 )  PTT:30.8 sec  CAPILLARY BLOOD GLUCOSE      POCT Blood Glucose.: 127 mg/dL (21 Dec 2017 05:43)  POCT Blood Glucose.: 138 mg/dL (20 Dec 2017 23:55)  POCT Blood Glucose.: 133 mg/dL (20 Dec 2017 16:35)

## 2017-12-21 NOTE — PROGRESS NOTE ADULT - ATTENDING COMMENTS
63 yo M with RLE nonhealing ulcers and PAD and extensive RLE cellulitis POD #6 s/p wound debridements and POD #2 s/p aortobifemoral bypass grafting.  -NPWT to with veraflow with Q2-3 day changes. Continue dakins dressings to medial necrotic wound.  -Care per vascular  -Will plan definitive reconstruction as an outpatient.

## 2017-12-21 NOTE — PHYSICAL THERAPY INITIAL EVALUATION ADULT - IMPAIRMENTS FOUND, PT EVAL
muscle strength/aerobic capacity/endurance/circulation/gait, locomotion, and balance/integumentary integrity

## 2017-12-21 NOTE — PHYSICAL THERAPY INITIAL EVALUATION ADULT - PERTINENT HX OF CURRENT PROBLEM, REHAB EVAL
62 year old male with RLE non-healing ulcers, & chronic infrarenal aortic occulsion now s/p aorto-bifem bypass w/ Hemashielf graft 12/19.

## 2017-12-21 NOTE — CHART NOTE - NSCHARTNOTEFT_GEN_A_CORE
Admitting Diagnosis:   62M w/ HTN & PAD c/b RLE non-healing ulcers, & chronic infrarenal aortic occulsion now s/p aorto-bifem bypass w/ Hemashielf graft 12/19. S/P extubation 12/20.     PAST MEDICAL & SURGICAL HISTORY:  HTN (hypertension)  Cellulitis    Current Nutrition Order: NPO    GI Issues: WDL    Pain: None reported    Skin Integrity: Surgical incision    Labs:   12-21    142  |  109<H>  |  16  ----------------------------<  138<H>  4.2   |  20<L>  |  1.93<H>    Ca    7.8<L>      21 Dec 2017 05:19  Phos  3.8     12-21  Mg     2.3     12-21    TPro  4.2<L>  /  Alb  2.2<L>  /  TBili  0.8  /  DBili  x   /  AST  40  /  ALT  61<H>  /  AlkPhos  51  12-19    POCT Blood Glucose.: 131 mg/dL (21 Dec 2017 12:19)  POCT Blood Glucose.: 127 mg/dL (21 Dec 2017 05:43)  POCT Blood Glucose.: 138 mg/dL (20 Dec 2017 23:55)  POCT Blood Glucose.: 133 mg/dL (20 Dec 2017 16:35)    Medications:  MEDICATIONS  (STANDING):  aspirin Suppository 300 milliGRAM(s) Rectal daily  cefepime  IVPB 2000 milliGRAM(s) IV Intermittent every 12 hours  Dakins Solution - 1/4 Strength 1 Application(s) Topical daily  diphenhydrAMINE   Injectable 50 milliGRAM(s) IV Push once  heparin  Injectable 7500 Unit(s) SubCutaneous every 8 hours  insulin lispro (HumaLOG) corrective regimen sliding scale   SubCutaneous every 6 hours  metoprolol    tartrate Injectable 5 milliGRAM(s) IV Push every 6 hours  sodium chloride 0.9%. 1000 milliLiter(s) (75 mL/Hr) IV Continuous <Continuous>    MEDICATIONS  (PRN):  HYDROmorphone  Injectable 1 milliGRAM(s) IV Push every 4 hours PRN Severe Pain (7 - 10)  HYDROmorphone  Injectable 0.5 milliGRAM(s) IV Push every 4 hours PRN Moderate Pain (4 - 6)    Weight:  108.9 kg 12/13  108.9 kg 12/15  108.9 kg 12/19    Weight Change: Wt stable    Estimated energy needs using 89 kg IBW:  Protein needs increased 2/2 nonhealing ulcers & for post op needs.   25-30 kcal/kg (4342-7078 kcal).   1.2-1.4 g/kg (107-125 g protein).   30-35 mL/kg (2957-0876 mL fluid).      Subjective: Currently NPO s/p extubation. Resting at time of visit.     Previous Nutrition Diagnosis: Increased nutrient needs (specify); kcals and protein RT increased needs for wound healing AEB 3 non-healing RLE ulcers    Active [ X  ]  Resolved [   ]    Goal: Meet % of nutrition needs consistently    Recommendations:  1. Advance as feasible to DASH diet  2. Trend weights weekly    Education: N/A this visit, RD educated pt on prior visit.     Risk Level: High [ X  ] Moderate [   ] Low [   ]

## 2017-12-21 NOTE — PHYSICAL THERAPY INITIAL EVALUATION ADULT - DIAGNOSIS, PT EVAL
4I: impaired joint mobility, motor function, muscle performance, and range of motion associated with bony or soft tissue surgery

## 2017-12-21 NOTE — PROGRESS NOTE ADULT - ASSESSMENT
63 y/o male with RLE nonhealing ulcers x 3 s/p debridement and aortobifem bypass  - continue WTD dressing changes  - continue wound vac  - plan to change vac to veriflow tomorrow  - consider pain mgmt consult  - ID consult

## 2017-12-21 NOTE — PROGRESS NOTE ADULT - SUBJECTIVE AND OBJECTIVE BOX
SICU DAILY PROGRESS NOTE      INTERVAL HPI / OVERNIGHT EVENTS:   12/21: 5mg q6 of lopressor, decreased fluids to 75cc, cefepime dose adjsuted based on renal function   O/N: -150/hr, no issues  12/20: Vanc trough 21.6; will consult ID (Service: Negin) for abx recs given +patella cx; rec cefepime 2q8.. Vac changed bedside by Plastics. extubated. Bolused 500cc NS for UOP 30-40cc/h.     MEDICATIONS:  ---NEURO-  aspirin Suppository 300 milliGRAM(s) Rectal daily  HYDROmorphone  Injectable 1 milliGRAM(s) IV Push every 4 hours PRN  HYDROmorphone  Injectable 0.5 milliGRAM(s) IV Push every 4 hours PRN  ---PULM-  diphenhydrAMINE   Injectable 50 milliGRAM(s) IV Push once  ---GI/FEN-  sodium chloride 0.9%. 1000 milliLiter(s) IV Continuous <Continuous>  ---ID-   cefepime  IVPB 2000 milliGRAM(s) IV Intermittent every 12 hours  ---ENDO-  insulin lispro (HumaLOG) corrective regimen sliding scale   SubCutaneous every 6 hours  ---HEME-  heparin  Injectable 7500 Unit(s) SubCutaneous every 8 hours  ---OTHER-  Dakins Solution - 1/4 Strength 1 Application(s) Topical daily      ANTIBIOTICS: cefepime  IVPB 2000 milliGRAM(s) IV Intermittent every 12 hours    PRESSORS: NO    CENTRAL LINE: [facility] (12/19--)     Remove: NO     Indication: Venous access in critically ill patient.    ARTERIAL LINE: L radial (12/19--)     Remove: NO     Indication: Hemodynamic monitoring in critically ill patient.     URINARY CATHETER: YES (12/19--)     Remove: NO     Indication: I/O monitoring in critically ill patient.       VOLUME STATUS:   I&O's Detail    20 Dec 2017 07:01  -  21 Dec 2017 07:00  --------------------------------------------------------  IN:    fentaNYL  Infusion: 34 mL    IV PiggyBack: 50 mL    sodium chloride 0.9%: 3600 mL    Sodium Chloride 0.9% IV Bolus: 500 mL  Total IN: 4184 mL    OUT:    Drain: 120 mL    Indwelling Catheter - Urethral: 2240 mL    Nasoenteral Tube: 200 mL  Total OUT: 2560 mL    Total NET: 1624 mL      21 Dec 2017 07:01  -  21 Dec 2017 09:33  --------------------------------------------------------  IN:    sodium chloride 0.9%: 300 mL  Total IN: 300 mL    OUT:    Indwelling Catheter - Urethral: 150 mL    Nasoenteral Tube: 200 mL  Total OUT: 350 mL    Total NET: -50 mL        ICU Vital Signs Last 24 Hrs  T(C): 37.4 (21 Dec 2017 06:24), Max: 37.9 (20 Dec 2017 21:43)  T(F): 99.4 (21 Dec 2017 06:24), Max: 100.2 (20 Dec 2017 21:43)  HR: 96 (21 Dec 2017 08:00) (88 - 118)  BP: 155/51 (20 Dec 2017 17:00) (111/51 - 200/72)  BP(mean): 84 (20 Dec 2017 17:00) (75 - 119)  ABP: 126/54 (21 Dec 2017 08:00) (92/52 - 174/68)  ABP(mean): 78 (21 Dec 2017 08:00) (66 - 102)  RR: 12 (21 Dec 2017 08:00) (4 - 28)  SpO2: 98% (21 Dec 2017 08:00) (91% - 100%)    CAPILLARY BLOOD GLUCOSE  127 mg/dL (21 Dec 2017 05:43)  138 mg/dL (20 Dec 2017 23:55)  133 mg/dL (20 Dec 2017 16:35)  167 mg/dL (20 Dec 2017 11:13)    PHYSICAL EXAM:  NEURO: A&Ox3, ANNE, NAD  CV: RRR, S1&S2  PULM: CTAB  ABD: Soft, midly distended, unable to assess tenderness d/t sedation. Midline incision island dressing w/ sanguinous stain at inferior aspect; no signs of hematoma formation. Bilateral groin incisions covered by Prevena vacs w/ good seal. MEHRAN drains to continuous wall suction in bilateral groin incisions- SS.   EXTR: WWP, peripheral edema, bilateral lower extremity chronic ulcers. Wound vac in place over R knee w/ good seal.   VASC: R biphasic popliteal signal, R monophasic PT signal; L biphasic DP/PT signals    LABS:                        8.7    9.8   )-----------( 265      ( 21 Dec 2017 05:16 )             26.3     12-21    142  |  109<H>  |  16  ----------------------------<  138<H>  4.2   |  20<L>  |  1.93<H>    Ca    7.8<L>      21 Dec 2017 05:19  Phos  3.8     12-21  Mg     2.3     12-21    TPro  4.2<L>  /  Alb  2.2<L>  /  TBili  0.8  /  DBili  x   /  AST  40  /  ALT  61<H>  /  AlkPhos  51  12-19    PT/INR - ( 19 Dec 2017 15:04 )   PT: 13.5 sec;   INR: 1.21          PTT - ( 19 Dec 2017 15:04 )  PTT:30.8 sec

## 2017-12-21 NOTE — PROGRESS NOTE ADULT - ATTENDING COMMENTS
Patient seen and examined with house-staff during bedside rounds.  Resident note read, including vitals, physical findings, laboratory data, and radiological reports.   Revisions included below.  Direct personal management at bed side and extensive interpretation of the data.  Plan was outlined and discussed in details with the housestaff.  Decision making of high complexity  I discussed the condition  with vascular. That concern is the parameters for blood pressure for proper [place] perfusion and avoid any damage to the graft.  also the patient is three liter fluid positive concern of fluid overload. Oh will decreased the IV fluid to 75 mL in hour and increase BB

## 2017-12-22 LAB
ANION GAP SERPL CALC-SCNC: 14 MMOL/L — SIGNIFICANT CHANGE UP (ref 5–17)
BUN SERPL-MCNC: 19 MG/DL — SIGNIFICANT CHANGE UP (ref 7–23)
CALCIUM SERPL-MCNC: 8.7 MG/DL — SIGNIFICANT CHANGE UP (ref 8.4–10.5)
CHLORIDE SERPL-SCNC: 111 MMOL/L — HIGH (ref 96–108)
CO2 SERPL-SCNC: 25 MMOL/L — SIGNIFICANT CHANGE UP (ref 22–31)
CREAT SERPL-MCNC: 1.67 MG/DL — HIGH (ref 0.5–1.3)
GLUCOSE BLDC GLUCOMTR-MCNC: 103 MG/DL — HIGH (ref 70–99)
GLUCOSE BLDC GLUCOMTR-MCNC: 110 MG/DL — HIGH (ref 70–99)
GLUCOSE BLDC GLUCOMTR-MCNC: 122 MG/DL — HIGH (ref 70–99)
GLUCOSE BLDC GLUCOMTR-MCNC: 126 MG/DL — HIGH (ref 70–99)
GLUCOSE BLDC GLUCOMTR-MCNC: 141 MG/DL — HIGH (ref 70–99)
GLUCOSE SERPL-MCNC: 134 MG/DL — HIGH (ref 70–99)
HCT VFR BLD CALC: 30.2 % — LOW (ref 39–50)
HGB BLD-MCNC: 9.8 G/DL — LOW (ref 13–17)
MAGNESIUM SERPL-MCNC: 2.6 MG/DL — SIGNIFICANT CHANGE UP (ref 1.6–2.6)
MCHC RBC-ENTMCNC: 30.3 PG — SIGNIFICANT CHANGE UP (ref 27–34)
MCHC RBC-ENTMCNC: 32.5 G/DL — SIGNIFICANT CHANGE UP (ref 32–36)
MCV RBC AUTO: 93.5 FL — SIGNIFICANT CHANGE UP (ref 80–100)
PHOSPHATE SERPL-MCNC: 3.3 MG/DL — SIGNIFICANT CHANGE UP (ref 2.5–4.5)
PLATELET # BLD AUTO: 343 K/UL — SIGNIFICANT CHANGE UP (ref 150–400)
POTASSIUM SERPL-MCNC: 4.3 MMOL/L — SIGNIFICANT CHANGE UP (ref 3.5–5.3)
POTASSIUM SERPL-SCNC: 4.3 MMOL/L — SIGNIFICANT CHANGE UP (ref 3.5–5.3)
RBC # BLD: 3.23 M/UL — LOW (ref 4.2–5.8)
RBC # FLD: 14.8 % — SIGNIFICANT CHANGE UP (ref 10.3–16.9)
SODIUM SERPL-SCNC: 150 MMOL/L — HIGH (ref 135–145)
WBC # BLD: 11.3 K/UL — HIGH (ref 3.8–10.5)
WBC # FLD AUTO: 11.3 K/UL — HIGH (ref 3.8–10.5)

## 2017-12-22 PROCEDURE — 71010: CPT | Mod: 26,76

## 2017-12-22 PROCEDURE — 99233 SBSQ HOSP IP/OBS HIGH 50: CPT | Mod: GC

## 2017-12-22 RX ORDER — METOPROLOL TARTRATE 50 MG
7.5 TABLET ORAL EVERY 6 HOURS
Qty: 0 | Refills: 0 | Status: DISCONTINUED | OUTPATIENT
Start: 2017-12-22 | End: 2017-12-23

## 2017-12-22 RX ORDER — HYDROMORPHONE HYDROCHLORIDE 2 MG/ML
1 INJECTION INTRAMUSCULAR; INTRAVENOUS; SUBCUTANEOUS EVERY 4 HOURS
Qty: 0 | Refills: 0 | Status: DISCONTINUED | OUTPATIENT
Start: 2017-12-22 | End: 2017-12-29

## 2017-12-22 RX ORDER — CEFEPIME 1 G/1
INJECTION, POWDER, FOR SOLUTION INTRAMUSCULAR; INTRAVENOUS
Qty: 0 | Refills: 0 | Status: DISCONTINUED | OUTPATIENT
Start: 2017-12-22 | End: 2017-12-29

## 2017-12-22 RX ORDER — METOPROLOL TARTRATE 50 MG
7.5 TABLET ORAL EVERY 6 HOURS
Qty: 0 | Refills: 0 | Status: DISCONTINUED | OUTPATIENT
Start: 2017-12-22 | End: 2017-12-22

## 2017-12-22 RX ORDER — CEFEPIME 1 G/1
2000 INJECTION, POWDER, FOR SOLUTION INTRAMUSCULAR; INTRAVENOUS ONCE
Qty: 0 | Refills: 0 | Status: COMPLETED | OUTPATIENT
Start: 2017-12-22 | End: 2017-12-22

## 2017-12-22 RX ORDER — LABETALOL HCL 100 MG
10 TABLET ORAL ONCE
Qty: 0 | Refills: 0 | Status: COMPLETED | OUTPATIENT
Start: 2017-12-22 | End: 2017-12-22

## 2017-12-22 RX ORDER — HYDROMORPHONE HYDROCHLORIDE 2 MG/ML
1 INJECTION INTRAMUSCULAR; INTRAVENOUS; SUBCUTANEOUS DAILY
Qty: 0 | Refills: 0 | Status: DISCONTINUED | OUTPATIENT
Start: 2017-12-22 | End: 2017-12-29

## 2017-12-22 RX ORDER — SODIUM CHLORIDE 9 MG/ML
1000 INJECTION, SOLUTION INTRAVENOUS
Qty: 0 | Refills: 0 | Status: DISCONTINUED | OUTPATIENT
Start: 2017-12-22 | End: 2017-12-23

## 2017-12-22 RX ORDER — CEFEPIME 1 G/1
2000 INJECTION, POWDER, FOR SOLUTION INTRAMUSCULAR; INTRAVENOUS EVERY 8 HOURS
Qty: 0 | Refills: 0 | Status: DISCONTINUED | OUTPATIENT
Start: 2017-12-22 | End: 2017-12-29

## 2017-12-22 RX ORDER — PANTOPRAZOLE SODIUM 20 MG/1
40 TABLET, DELAYED RELEASE ORAL DAILY
Qty: 0 | Refills: 0 | Status: DISCONTINUED | OUTPATIENT
Start: 2017-12-22 | End: 2017-12-24

## 2017-12-22 RX ADMIN — Medication 5 MILLIGRAM(S): at 05:16

## 2017-12-22 RX ADMIN — HYDROMORPHONE HYDROCHLORIDE 1 MILLIGRAM(S): 2 INJECTION INTRAMUSCULAR; INTRAVENOUS; SUBCUTANEOUS at 20:56

## 2017-12-22 RX ADMIN — SODIUM CHLORIDE 75 MILLILITER(S): 9 INJECTION, SOLUTION INTRAVENOUS at 17:25

## 2017-12-22 RX ADMIN — CEFEPIME 100 MILLIGRAM(S): 1 INJECTION, POWDER, FOR SOLUTION INTRAMUSCULAR; INTRAVENOUS at 18:26

## 2017-12-22 RX ADMIN — HYDROMORPHONE HYDROCHLORIDE 1 MILLIGRAM(S): 2 INJECTION INTRAMUSCULAR; INTRAVENOUS; SUBCUTANEOUS at 16:19

## 2017-12-22 RX ADMIN — Medication 7.5 MILLIGRAM(S): at 11:07

## 2017-12-22 RX ADMIN — HYDROMORPHONE HYDROCHLORIDE 1 MILLIGRAM(S): 2 INJECTION INTRAMUSCULAR; INTRAVENOUS; SUBCUTANEOUS at 21:42

## 2017-12-22 RX ADMIN — Medication 10 MILLIGRAM(S): at 05:43

## 2017-12-22 RX ADMIN — HEPARIN SODIUM 7500 UNIT(S): 5000 INJECTION INTRAVENOUS; SUBCUTANEOUS at 05:17

## 2017-12-22 RX ADMIN — PANTOPRAZOLE SODIUM 40 MILLIGRAM(S): 20 TABLET, DELAYED RELEASE ORAL at 16:19

## 2017-12-22 RX ADMIN — SODIUM CHLORIDE 75 MILLILITER(S): 9 INJECTION, SOLUTION INTRAVENOUS at 09:09

## 2017-12-22 RX ADMIN — Medication 10 MILLIGRAM(S): at 07:21

## 2017-12-22 RX ADMIN — HYDROMORPHONE HYDROCHLORIDE 0.5 MILLIGRAM(S): 2 INJECTION INTRAMUSCULAR; INTRAVENOUS; SUBCUTANEOUS at 17:44

## 2017-12-22 RX ADMIN — Medication 1 APPLICATION(S): at 13:05

## 2017-12-22 RX ADMIN — CEFEPIME 100 MILLIGRAM(S): 1 INJECTION, POWDER, FOR SOLUTION INTRAMUSCULAR; INTRAVENOUS at 10:56

## 2017-12-22 RX ADMIN — HEPARIN SODIUM 7500 UNIT(S): 5000 INJECTION INTRAVENOUS; SUBCUTANEOUS at 14:29

## 2017-12-22 RX ADMIN — HYDROMORPHONE HYDROCHLORIDE 0.5 MILLIGRAM(S): 2 INJECTION INTRAMUSCULAR; INTRAVENOUS; SUBCUTANEOUS at 01:18

## 2017-12-22 RX ADMIN — HYDROMORPHONE HYDROCHLORIDE 0.5 MILLIGRAM(S): 2 INJECTION INTRAMUSCULAR; INTRAVENOUS; SUBCUTANEOUS at 18:30

## 2017-12-22 RX ADMIN — SODIUM CHLORIDE 75 MILLILITER(S): 9 INJECTION INTRAMUSCULAR; INTRAVENOUS; SUBCUTANEOUS at 07:23

## 2017-12-22 RX ADMIN — HYDROMORPHONE HYDROCHLORIDE 1 MILLIGRAM(S): 2 INJECTION INTRAMUSCULAR; INTRAVENOUS; SUBCUTANEOUS at 11:28

## 2017-12-22 RX ADMIN — HEPARIN SODIUM 7500 UNIT(S): 5000 INJECTION INTRAVENOUS; SUBCUTANEOUS at 21:44

## 2017-12-22 RX ADMIN — Medication 300 MILLIGRAM(S): at 11:28

## 2017-12-22 RX ADMIN — HYDROMORPHONE HYDROCHLORIDE 0.5 MILLIGRAM(S): 2 INJECTION INTRAMUSCULAR; INTRAVENOUS; SUBCUTANEOUS at 01:00

## 2017-12-22 RX ADMIN — HYDROMORPHONE HYDROCHLORIDE 1 MILLIGRAM(S): 2 INJECTION INTRAMUSCULAR; INTRAVENOUS; SUBCUTANEOUS at 12:00

## 2017-12-22 RX ADMIN — HYDROMORPHONE HYDROCHLORIDE 1 MILLIGRAM(S): 2 INJECTION INTRAMUSCULAR; INTRAVENOUS; SUBCUTANEOUS at 17:00

## 2017-12-22 RX ADMIN — Medication 7.5 MILLIGRAM(S): at 17:43

## 2017-12-22 RX ADMIN — CEFEPIME 100 MILLIGRAM(S): 1 INJECTION, POWDER, FOR SOLUTION INTRAMUSCULAR; INTRAVENOUS at 05:17

## 2017-12-22 NOTE — PROGRESS NOTE ADULT - ASSESSMENT
63 y/o male with RLE nonhealing ulcers x 3 s/p debridement and aortobifem bypass  - continue WTD dressing changes  -change vac to veraflow tomorrow  - consider pain mgmt consult  - ID consult 63 y/o male with RLE nonhealing ulcers x 3 s/p debridement and aortobifem bypass  - continue WTD dressing changes  -change vac to veraflow tomorrow  - consider pain mgmt consult  - ID consult/recs	  -better BP control

## 2017-12-22 NOTE — PROGRESS NOTE ADULT - SUBJECTIVE AND OBJECTIVE BOX
SUBJECTIVE:  No overnight events.     OBJECTIVE:   Vital Signs Last 24 Hrs  T(C): 35.8 (12-22-17 @ 05:22), Max: 37.4 (12-21-17 @ 17:55)  T(F): 96.5 (12-22-17 @ 05:22), Max: 99.3 (12-21-17 @ 17:55)  HR: 82 (12-22-17 @ 12:00) (70 - 104)  BP: 132/57 (12-22-17 @ 12:00) (132/57 - 171/64)  BP(mean): 85 (12-22-17 @ 12:00) (85 - 105)  RR: 20 (12-22-17 @ 12:00) (7 - 22)  SpO2: 97% (12-22-17 @ 12:00) (96% - 99%)  I&O's Detail    21 Dec 2017 07:01  -  22 Dec 2017 07:00  --------------------------------------------------------  IN:    IV PiggyBack: 100 mL    Packed Red Blood Cells: 500 mL    sodium chloride 0.9%: 300 mL    sodium chloride 0.9%: 1575 mL  Total IN: 2475 mL    OUT:    Drain: 100 mL    Indwelling Catheter - Urethral: 3350 mL    Nasoenteral Tube: 1200 mL  Total OUT: 4650 mL    Total NET: -2175 mL      22 Dec 2017 07:01  -  22 Dec 2017 12:29  --------------------------------------------------------  IN:    IV PiggyBack: 50 mL    lactated ringers.: 263 mL    sodium chloride 0.9%: 75 mL  Total IN: 388 mL    OUT:    Indwelling Catheter - Urethral: 620 mL  Total OUT: 620 mL    Total NET: -232 mL      PE:  NAD, AOx3  NG tube in place  RLE: wound vac in place on patella and posterior lower leg, WTD on medial knee    ** LABS **                             9.8    11.3  )-----------( 343      ( 22 Dec 2017 05:36 )             30.2     22 Dec 2017 05:37    150    |  111    |  19     ----------------------------<  134    4.3     |  25     |  1.67     Ca    8.7        22 Dec 2017 05:37  Phos  3.3       22 Dec 2017 05:37  Mg     2.6       22 Dec 2017 05:37          CAPILLARY BLOOD GLUCOSE      POCT Blood Glucose.: 141 mg/dL (22 Dec 2017 11:11)  POCT Blood Glucose.: 122 mg/dL (22 Dec 2017 05:10)  POCT Blood Glucose.: 103 mg/dL (22 Dec 2017 01:15)  POCT Blood Glucose.: 104 mg/dL (21 Dec 2017 16:19)

## 2017-12-22 NOTE — PROGRESS NOTE ADULT - SUBJECTIVE AND OBJECTIVE BOX
SICU DAILY PROGRESS NOTE      INTERVAL HPI / OVERNIGHT EVENTS:   12/22: NGT output 600cc/shift, 1200cc/24h. -F/-BM. MEHRAN drains removed. Downtrending Cr 1.67 from peak 1.93 post-op, (0.8 pre-op); increased cefepime frequency to q8h. Persistently hypertensive; increased to metorpolol 7.5q6h. A-line, RIJ & Solorio to be removed; TOV pending.   ON: BP high added labetalol 10 prn bp> 160 However BP came down nicely with dilaudid. Pt stoic and not stating he's in pain just refusing to be moved or touched.   12/21: 5mg q6 of Lopressor, decreased fluids to 75cc, cefepime dose adjsuted based on renal function. Transfused 1u PRBC for Hgb 8.7 as per vascular. Bolused 500cc NS in AM w/ UOP >100cc/h throughout the day. Pain Mgmt consulted.   O/N: -150/hr, no issues      MEDICATIONS:  ---NEURO-  aspirin Suppository 300 milliGRAM(s) Rectal daily  HYDROmorphone  Injectable 1 milliGRAM(s) IV Push every 4 hours PRN  HYDROmorphone  Injectable 0.5 milliGRAM(s) IV Push every 4 hours PRN  ---CV-  labetalol Injectable 10 milliGRAM(s) IV Push every 6 hours PRN  metoprolol    tartrate Injectable 7.5 milliGRAM(s) IV Push every 6 hours  ---PULM-  diphenhydrAMINE   Injectable 50 milliGRAM(s) IV Push once  ---GI/FEN-  lactated ringers. 1000 milliLiter(s) IV Continuous <Continuous>  ---ID-   cefepime  IVPB 2000 milliGRAM(s) IV Intermittent every 8 hours  ---ENDO-  insulin lispro (HumaLOG) corrective regimen sliding scale   SubCutaneous every 6 hours  ---HEME-  heparin  Injectable 7500 Unit(s) SubCutaneous every 8 hours  ---OTHER-  Dakins Solution - 1/4 Strength 1 Application(s) Topical daily      ANTIBIOTICS: cefepime  IVPB 2000 milliGRAM(s) IV Intermittent every 8 hours  PRESSORS: NO  CENTRAL LINE: [facility] (12/19-22)             Remove: YES  ARTERIAL LINE: L radial (12/19-22)      Remove: YES  URINARY CATHETER: YES (12/19-22)   Remove: YES      VOLUME STATUS:   I&O's Detail    21 Dec 2017 07:01  -  22 Dec 2017 07:00  --------------------------------------------------------  IN:    IV PiggyBack: 100 mL    Packed Red Blood Cells: 500 mL    sodium chloride 0.9%: 300 mL    sodium chloride 0.9%: 1575 mL  Total IN: 2475 mL    OUT:    Drain: 100 mL    Indwelling Catheter - Urethral: 3350 mL    Nasoenteral Tube: 1200 mL  Total OUT: 4650 mL    Total NET: -2175 mL      22 Dec 2017 07:01  -  22 Dec 2017 09:25  --------------------------------------------------------  IN:    lactated ringers.: 75 mL    sodium chloride 0.9%: 75 mL  Total IN: 150 mL    OUT:    Indwelling Catheter - Urethral: 240 mL  Total OUT: 240 mL    Total NET: -90 mL        ICU Vital Signs Last 24 Hrs  T(C): 35.8 (22 Dec 2017 05:22), Max: 37.6 (21 Dec 2017 10:06)  T(F): 96.5 (22 Dec 2017 05:22), Max: 99.7 (21 Dec 2017 10:06)  HR: 76 (22 Dec 2017 09:00) (70 - 110)  BP: 156/63 (22 Dec 2017 09:00) (156/63 - 188/77)  BP(mean): 93 (22 Dec 2017 09:00) (93 - 113)  ABP: 168/54 (22 Dec 2017 09:00) (128/54 - 192/68)  ABP(mean): 90 (22 Dec 2017 09:00) (78 - 118)  RR: 15 (22 Dec 2017 09:00) (7 - 36)  SpO2: 98% (22 Dec 2017 09:00) (96% - 99%)      CAPILLARY BLOOD GLUCOSE  122 mg/dL (22 Dec 2017 05:10)  103 mg/dL (22 Dec 2017 01:15)  104 mg/dL (21 Dec 2017 16:19)  131 mg/dL (21 Dec 2017 12:19)    PHYSICAL EXAM:  NEURO: A&Ox3, ANNE, NAD  CV: RRR, S1&S2  PULM: CTAB  ABD: Soft, ND, matias-incisional tenderness. Midline incision staples in place; no surrounding warmth or erythema. Bilateral groin incisions covered by Prevena vacs w/ good seal.   EXTR: WWP, peripheral edema, bilateral lower extremity chronic ulcers. Wound vac in place over R knee w/ good seal.   VASC: R biphasic popliteal signal, R monophasic PT signal; L palpable DP, L biphasic PT signals    LABS:                        9.8    11.3  )-----------( 343      ( 22 Dec 2017 05:36 )             30.2     150<H>  |  111<H>  |  19  ----------------------------<  134<H>  4.3   |  25  |  1.67<H>    Ca    8.7      22 Dec 2017 05:37  Phos  3.3     12-22  Mg     2.6     12-22

## 2017-12-22 NOTE — CHART NOTE - NSCHARTNOTEFT_GEN_A_CORE
NGT output remains high--600cc last night & 450cc today. Repeat AXR in PM showed tip of NGT at GE junction. W/ RN present, MD explained the purpose of the tube (ie, bowel decompression), its current position, and the need to advance the tube about 5cm to optimize its function. Emphasized that tube would not be removed and reinserted, just pushed forward a small amount. Patient expressed understanding and agreed. Tape securing tube was removed from nose and tube was advanced 5cm. Patient screamed and attempted to pull the tube out. Family member ran to bedside to lay across the patient on top of MD's hands. MD explained that the NGT is very important for his recovery and needs to remain in place. Asked family member to please allow the tube to be secured in its new position, and to please wait outside the room for a moment if being present was too upsetting. Family member yelled at MD re: 'wiggling the tube too much' and 'lacking compassion.' MD expressed understanding of their concerns. Repeat AXR ordered to confirm new position.

## 2017-12-22 NOTE — PROGRESS NOTE ADULT - ATTENDING COMMENTS
61 yo M with RLE nonhealing ulcers and PAD and extensive RLE cellulitis POD #7 s/p wound debridements and POD #3 s/p aortobifemoral bypass grafting.  -NPWT with veraflow tomorrow with Q2-3 day changes. Continue dakins dressings to medial necrotic wound.  -Care per vascular  -Will plan definitive reconstruction as an outpatient.

## 2017-12-22 NOTE — PROGRESS NOTE ADULT - ATTENDING COMMENTS
Patient seen and examined with house-staff during bedside rounds.  Resident note read, including vitals, physical findings, laboratory data, and radiological reports.   Revisions included below.  Direct personal management at bed side and extensive interpretation of the data.  Plan was outlined and discussed in details with the housestaff.  Decision making of high complexity  Continue on antibiotics. Wound Care. Negative fluid balance due to autodiuresis.  Cr improved.  Hb stable.  Increase lopressor.

## 2017-12-22 NOTE — PROGRESS NOTE ADULT - ASSESSMENT
62M w/ HTN & PAD c/b RLE non-healing ulcers, & chronic infrarenal aortic occulsion now s/p aorto-bifem bypass w/ Hemashielf graft 12/19.     Neuro: Dilaudid PRN, Pain Mgmt consult.  CV:  DE, metoprolol 7.5q6h, labetalol PRN for SBP >160. LVEF 70%; normal NST. Cards ([facility]) consult.   Pulm: NC, chest PT  GI/FEN: NPO, NGT to LIWS, NS@75, Protonix  : TOV; STEPHON- Downtrending Cr 1.67 from peak 1.93 post-op, (0.8 pre-op)  ID: OM: Cefepime (12/20--) // D/c: Vancomycin (12/13-19), Zosyn (12/13-20). ID service ([facility]) consult.   Endo: ISS  Heme: SQH,  DE, hold Plavix  PPx: No SCDs, SQH, Duplex 12/16- No DVT, Protonix.  Lines: PIVs, // D/c: L radial a-line (12/19-22), RIJ (12/19-22)  Drains: None // D/c: MEHRAN drains x 2 in bilateral marvin to continuous wall suction  Wound: Bilateral groin incisions w/ Prevena vacs. Midline incision w/ island dressing. R knee wound vac (to be changed by Plastics). BLE wounds w/ WTD Dakins dressing changes by RN daily.   Activity: OOBTC, PT consulted 12/21

## 2017-12-23 LAB
ANION GAP SERPL CALC-SCNC: 11 MMOL/L — SIGNIFICANT CHANGE UP (ref 5–17)
ANION GAP SERPL CALC-SCNC: 13 MMOL/L — SIGNIFICANT CHANGE UP (ref 5–17)
BUN SERPL-MCNC: 22 MG/DL — SIGNIFICANT CHANGE UP (ref 7–23)
BUN SERPL-MCNC: 24 MG/DL — HIGH (ref 7–23)
CALCIUM SERPL-MCNC: 8.8 MG/DL — SIGNIFICANT CHANGE UP (ref 8.4–10.5)
CALCIUM SERPL-MCNC: 9 MG/DL — SIGNIFICANT CHANGE UP (ref 8.4–10.5)
CHLORIDE SERPL-SCNC: 108 MMOL/L — SIGNIFICANT CHANGE UP (ref 96–108)
CHLORIDE SERPL-SCNC: 109 MMOL/L — HIGH (ref 96–108)
CK MB CFR SERPL CALC: 2.2 NG/ML — SIGNIFICANT CHANGE UP (ref 0–6.7)
CK SERPL-CCNC: 176 U/L — SIGNIFICANT CHANGE UP (ref 30–200)
CO2 SERPL-SCNC: 25 MMOL/L — SIGNIFICANT CHANGE UP (ref 22–31)
CO2 SERPL-SCNC: 28 MMOL/L — SIGNIFICANT CHANGE UP (ref 22–31)
CREAT SERPL-MCNC: 1.46 MG/DL — HIGH (ref 0.5–1.3)
CREAT SERPL-MCNC: 1.55 MG/DL — HIGH (ref 0.5–1.3)
GLUCOSE BLDC GLUCOMTR-MCNC: 109 MG/DL — HIGH (ref 70–99)
GLUCOSE BLDC GLUCOMTR-MCNC: 113 MG/DL — HIGH (ref 70–99)
GLUCOSE BLDC GLUCOMTR-MCNC: 120 MG/DL — HIGH (ref 70–99)
GLUCOSE BLDC GLUCOMTR-MCNC: 145 MG/DL — HIGH (ref 70–99)
GLUCOSE SERPL-MCNC: 125 MG/DL — HIGH (ref 70–99)
GLUCOSE SERPL-MCNC: 148 MG/DL — HIGH (ref 70–99)
HCT VFR BLD CALC: 35.7 % — LOW (ref 39–50)
HGB BLD-MCNC: 11.2 G/DL — LOW (ref 13–17)
MAGNESIUM SERPL-MCNC: 2.4 MG/DL — SIGNIFICANT CHANGE UP (ref 1.6–2.6)
MAGNESIUM SERPL-MCNC: 2.5 MG/DL — SIGNIFICANT CHANGE UP (ref 1.6–2.6)
MCHC RBC-ENTMCNC: 29.8 PG — SIGNIFICANT CHANGE UP (ref 27–34)
MCHC RBC-ENTMCNC: 31.4 G/DL — LOW (ref 32–36)
MCV RBC AUTO: 94.9 FL — SIGNIFICANT CHANGE UP (ref 80–100)
PHOSPHATE SERPL-MCNC: 2.5 MG/DL — SIGNIFICANT CHANGE UP (ref 2.5–4.5)
PHOSPHATE SERPL-MCNC: 2.6 MG/DL — SIGNIFICANT CHANGE UP (ref 2.5–4.5)
PLATELET # BLD AUTO: 489 K/UL — HIGH (ref 150–400)
POTASSIUM SERPL-MCNC: 4.1 MMOL/L — SIGNIFICANT CHANGE UP (ref 3.5–5.3)
POTASSIUM SERPL-MCNC: 4.5 MMOL/L — SIGNIFICANT CHANGE UP (ref 3.5–5.3)
POTASSIUM SERPL-SCNC: 4.1 MMOL/L — SIGNIFICANT CHANGE UP (ref 3.5–5.3)
POTASSIUM SERPL-SCNC: 4.5 MMOL/L — SIGNIFICANT CHANGE UP (ref 3.5–5.3)
RBC # BLD: 3.76 M/UL — LOW (ref 4.2–5.8)
RBC # FLD: 14.7 % — SIGNIFICANT CHANGE UP (ref 10.3–16.9)
SODIUM SERPL-SCNC: 146 MMOL/L — HIGH (ref 135–145)
SODIUM SERPL-SCNC: 148 MMOL/L — HIGH (ref 135–145)
TROPONIN T SERPL-MCNC: <0.01 NG/ML — SIGNIFICANT CHANGE UP (ref 0–0.01)
WBC # BLD: 11.4 K/UL — HIGH (ref 3.8–10.5)
WBC # FLD AUTO: 11.4 K/UL — HIGH (ref 3.8–10.5)

## 2017-12-23 PROCEDURE — 99233 SBSQ HOSP IP/OBS HIGH 50: CPT | Mod: GC

## 2017-12-23 RX ORDER — METOPROLOL TARTRATE 50 MG
5 TABLET ORAL ONCE
Qty: 0 | Refills: 0 | Status: COMPLETED | OUTPATIENT
Start: 2017-12-23 | End: 2017-12-23

## 2017-12-23 RX ORDER — SODIUM CHLORIDE 9 MG/ML
1000 INJECTION INTRAMUSCULAR; INTRAVENOUS; SUBCUTANEOUS
Qty: 0 | Refills: 0 | Status: DISCONTINUED | OUTPATIENT
Start: 2017-12-23 | End: 2017-12-24

## 2017-12-23 RX ORDER — SODIUM CHLORIDE 9 MG/ML
1000 INJECTION INTRAMUSCULAR; INTRAVENOUS; SUBCUTANEOUS ONCE
Qty: 0 | Refills: 0 | Status: DISCONTINUED | OUTPATIENT
Start: 2017-12-23 | End: 2017-12-23

## 2017-12-23 RX ORDER — METOPROLOL TARTRATE 50 MG
10 TABLET ORAL EVERY 6 HOURS
Qty: 0 | Refills: 0 | Status: DISCONTINUED | OUTPATIENT
Start: 2017-12-23 | End: 2017-12-24

## 2017-12-23 RX ORDER — SODIUM CHLORIDE 9 MG/ML
1000 INJECTION, SOLUTION INTRAVENOUS ONCE
Qty: 0 | Refills: 0 | Status: COMPLETED | OUTPATIENT
Start: 2017-12-23 | End: 2017-12-23

## 2017-12-23 RX ORDER — POTASSIUM PHOSPHATE, MONOBASIC POTASSIUM PHOSPHATE, DIBASIC 236; 224 MG/ML; MG/ML
15 INJECTION, SOLUTION INTRAVENOUS ONCE
Qty: 0 | Refills: 0 | Status: COMPLETED | OUTPATIENT
Start: 2017-12-23 | End: 2017-12-23

## 2017-12-23 RX ADMIN — Medication 7.5 MILLIGRAM(S): at 11:33

## 2017-12-23 RX ADMIN — Medication 5 MILLIGRAM(S): at 21:08

## 2017-12-23 RX ADMIN — HYDROMORPHONE HYDROCHLORIDE 1 MILLIGRAM(S): 2 INJECTION INTRAMUSCULAR; INTRAVENOUS; SUBCUTANEOUS at 09:12

## 2017-12-23 RX ADMIN — Medication 50 MILLIGRAM(S): at 23:20

## 2017-12-23 RX ADMIN — HYDROMORPHONE HYDROCHLORIDE 1 MILLIGRAM(S): 2 INJECTION INTRAMUSCULAR; INTRAVENOUS; SUBCUTANEOUS at 21:30

## 2017-12-23 RX ADMIN — HYDROMORPHONE HYDROCHLORIDE 1 MILLIGRAM(S): 2 INJECTION INTRAMUSCULAR; INTRAVENOUS; SUBCUTANEOUS at 10:04

## 2017-12-23 RX ADMIN — Medication 5 MILLIGRAM(S): at 20:30

## 2017-12-23 RX ADMIN — CEFEPIME 100 MILLIGRAM(S): 1 INJECTION, POWDER, FOR SOLUTION INTRAMUSCULAR; INTRAVENOUS at 09:05

## 2017-12-23 RX ADMIN — HYDROMORPHONE HYDROCHLORIDE 1 MILLIGRAM(S): 2 INJECTION INTRAMUSCULAR; INTRAVENOUS; SUBCUTANEOUS at 02:53

## 2017-12-23 RX ADMIN — CEFEPIME 100 MILLIGRAM(S): 1 INJECTION, POWDER, FOR SOLUTION INTRAMUSCULAR; INTRAVENOUS at 17:28

## 2017-12-23 RX ADMIN — HEPARIN SODIUM 7500 UNIT(S): 5000 INJECTION INTRAVENOUS; SUBCUTANEOUS at 22:43

## 2017-12-23 RX ADMIN — HEPARIN SODIUM 7500 UNIT(S): 5000 INJECTION INTRAVENOUS; SUBCUTANEOUS at 13:27

## 2017-12-23 RX ADMIN — POTASSIUM PHOSPHATE, MONOBASIC POTASSIUM PHOSPHATE, DIBASIC 62.5 MILLIMOLE(S): 236; 224 INJECTION, SOLUTION INTRAVENOUS at 23:20

## 2017-12-23 RX ADMIN — Medication 300 MILLIGRAM(S): at 11:46

## 2017-12-23 RX ADMIN — Medication 7.5 MILLIGRAM(S): at 17:28

## 2017-12-23 RX ADMIN — Medication 7.5 MILLIGRAM(S): at 06:59

## 2017-12-23 RX ADMIN — Medication 10 MILLIGRAM(S): at 01:08

## 2017-12-23 RX ADMIN — Medication 7.5 MILLIGRAM(S): at 00:02

## 2017-12-23 RX ADMIN — HEPARIN SODIUM 7500 UNIT(S): 5000 INJECTION INTRAVENOUS; SUBCUTANEOUS at 06:58

## 2017-12-23 RX ADMIN — HYDROMORPHONE HYDROCHLORIDE 1 MILLIGRAM(S): 2 INJECTION INTRAMUSCULAR; INTRAVENOUS; SUBCUTANEOUS at 04:00

## 2017-12-23 RX ADMIN — PANTOPRAZOLE SODIUM 40 MILLIGRAM(S): 20 TABLET, DELAYED RELEASE ORAL at 11:33

## 2017-12-23 RX ADMIN — SODIUM CHLORIDE 2000 MILLILITER(S): 9 INJECTION, SOLUTION INTRAVENOUS at 21:30

## 2017-12-23 RX ADMIN — HYDROMORPHONE HYDROCHLORIDE 1 MILLIGRAM(S): 2 INJECTION INTRAMUSCULAR; INTRAVENOUS; SUBCUTANEOUS at 16:29

## 2017-12-23 RX ADMIN — CEFEPIME 100 MILLIGRAM(S): 1 INJECTION, POWDER, FOR SOLUTION INTRAMUSCULAR; INTRAVENOUS at 02:01

## 2017-12-23 RX ADMIN — Medication 10 MILLIGRAM(S): at 10:40

## 2017-12-23 RX ADMIN — Medication 5 MILLIGRAM(S): at 20:46

## 2017-12-23 RX ADMIN — HYDROMORPHONE HYDROCHLORIDE 1 MILLIGRAM(S): 2 INJECTION INTRAMUSCULAR; INTRAVENOUS; SUBCUTANEOUS at 21:09

## 2017-12-23 NOTE — PROGRESS NOTE ADULT - ASSESSMENT
63y/o M w VAC dressing for non healing wounds on rt lower limb    plan:  continue VAC dressing w change every 3-5d  appreciate ace wrap on lower limb if vascular approve

## 2017-12-23 NOTE — PROGRESS NOTE ADULT - ATTENDING COMMENTS
63 yo M with RLE nonhealing ulcers and PAD and extensive RLE cellulitis POD #8 s/p wound debridements and POD #4 s/p aortobifemoral bypass grafting. Veraflow added to NPWT yesterday.  -NPWT with veraflow to RLE wounds with Q2-3 day changes.   -Care per vascular  -Will plan definitive reconstruction as an outpatient.

## 2017-12-23 NOTE — PROGRESS NOTE ADULT - ASSESSMENT
62M w/ HTN & PAD c/b RLE non-healing ulcers, & chronic infrarenal aortic occulsion now s/p aorto-bifem bypass w/ Hemashielf graft 12/19.     Neuro: Dilaudid PRN, Pain Mgmt consult.  CV:  PA, metoprolol 7.5q6h, labetalol PRN for SBP >160. LVEF 70%; normal NST. Cards ([facility]) consult.   Pulm: NC, chest PT  GI/FEN: NPO, NGT to LIWS, LR@75, Protonix  : Voids; STEPHON- Downtrending Cr 1.67 from peak 1.93 post-op, (0.8 pre-op)  ID: OM: Cefepime (12/20--) // D/c: Vancomycin (12/13-19), Zosyn (12/13-20). ID service ([facility]) consult.   Endo: ISS  Heme: SQH,  PA, hold Plavix  PPx: No SCDs, SQH, Duplex 12/16- No DVT, Protonix.  Lines: PIVs, // D/c: L radial a-line (12/19-22), RIJ (12/19-22)  Drains: None // D/c: MEHRAN drains x 2 in bilateral marvin to continuous wall suction  Wound: Bilateral groin incisions w/ Prevena vacs. Midline incision w/ island dressing. R knee wound vac (to be changed by Plastics). BLE wounds w/ WTD Dakins dressing changes by RN daily.   Activity: OOBTC, PT consulted 12/21

## 2017-12-23 NOTE — PROGRESS NOTE ADULT - SUBJECTIVE AND OBJECTIVE BOX
SUBJECTIVE:  Doing well.   No overnight events.     OBJECTIVE:     ** VITAL SIGNS / I&O's **    T(C): 37.1 (12-23-17 @ 10:07), Max: 37.7 (12-22-17 @ 22:03)  T(F): 98.7 (12-23-17 @ 10:07), Max: 99.9 (12-22-17 @ 22:03)  HR: 96 (12-23-17 @ 11:00) (72 - 102)  BP: 152/67 (12-23-17 @ 11:00) (132/57 - 175/72)  RR: 15 (12-23-17 @ 11:00) (10 - 27)  SpO2: 93% (12-23-17 @ 11:00) (89% - 97%)      22 Dec 2017 07:01  -  23 Dec 2017 07:00  --------------------------------------------------------  IN:    IV PiggyBack: 100 mL    lactated ringers.: 1726 mL    sodium chloride 0.9%: 75 mL  Total IN: 1901 mL    OUT:    Indwelling Catheter - Urethral: 1310 mL    Nasoenteral Tube: 750 mL    Voided: 700 mL  Total OUT: 2760 mL    Total NET: -859 mL      23 Dec 2017 07:01  -  23 Dec 2017 11:48  --------------------------------------------------------  IN:    IV PiggyBack: 50 mL    lactated ringers.: 150 mL    sodium chloride 0.225%.: 150 mL  Total IN: 350 mL    OUT:    Voided: 300 mL  Total OUT: 300 mL    Total NET: 50 mL          ** PHYSICAL EXAM **    -- CONSTITUTIONAL: AOx3. NAD.   -- EXTREMITIES: rt lower limb - VAC dressing closed, slight erythema on foot, leg edema pitting 3+  -- VASCULAR: no pulses on palpation throughout lower limb (POP, DP, TP, MO)    ** LABS **                 11.2   11.4   )----------(  489       ( 23 Dec 2017 06:35 )               35.7      148    |  109    |  22     ----------------------------<  148        ( 23 Dec 2017 06:35 )  4.5     |  28     |  1.55     Ca    9.0        ( 23 Dec 2017 06:35 )  Phos  2.6       ( 23 Dec 2017 06:35 )  Mg     2.5       ( 23 Dec 2017 06:35 )          CAPILLARY BLOOD GLUCOSE

## 2017-12-23 NOTE — PROGRESS NOTE ADULT - SUBJECTIVE AND OBJECTIVE BOX
S: Pt reports better pain control this morning with IV medications, no HA/SOB/CP.    Vitals: Vital Signs Last 24 Hrs  T(C): 36.4 (23 Dec 2017 06:18), Max: 37.7 (22 Dec 2017 22:03)  T(F): 97.6 (23 Dec 2017 06:18), Max: 99.9 (22 Dec 2017 22:03)  HR: 72 (23 Dec 2017 09:00) (72 - 102)  BP: 150/62 (23 Dec 2017 09:00) (132/57 - 175/72)  BP(mean): 78 (23 Dec 2017 09:00) (73 - 116)  RR: 16 (23 Dec 2017 09:00) (10 - 27)  SpO2: 95% (23 Dec 2017 09:00) (89% - 98%)            12-22 @ 07:01  -  12-23 @ 07:00  --------------------------------------------------------  IN: 1901 mL / OUT: 2760 mL / NET: -859 mL    12-23 @ 07:01  -  12-23 @ 09:28  --------------------------------------------------------  IN: 150 mL / OUT: 0 mL / NET: 150 mL            Labs:                         11.2   11.4  )-----------( 489      ( 23 Dec 2017 06:35 )             35.7   12-23    148<H>  |  109<H>  |  22  ----------------------------<  148<H>  4.5   |  28  |  1.55<H>    Ca    9.0      23 Dec 2017 06:35  Phos  2.6     12-23  Mg     2.5     12-23        Electrolytes repleated to goals as follows: Potassium 4, Phosphorus 3 and Magnesium 2 where appropriate and necessary    Exam:  Neuro: A&Ox3, NAD, grossly neuro intact  HEENT: PERRL < EOMI, MMM  Neck: Supple  CV: RRR, no MRGs  Pulm: CTAB, no wheezes, rales ronchi  Abd: BS (+), Soft, appropriately ttp around incision, midline incision with staples c/d/i, no surrounding erythema or ecchymosis, b/l groin wound vacs with good suction and seal intact, ND  : Solorio in place  Ext: No edema peripherally or centrally, b/l LE ulcers, wound vac with suction intact over R knee, good seal  Vasc: Extremities warm to palpation,  R biphasic popliteal signal, R monophasic PT signal; L palpable DP, L biphasic PT signals  MSK: no joint swelling  Skin: no rash  Psych: affect appropriate

## 2017-12-24 LAB
ANION GAP SERPL CALC-SCNC: 11 MMOL/L — SIGNIFICANT CHANGE UP (ref 5–17)
ANION GAP SERPL CALC-SCNC: 13 MMOL/L — SIGNIFICANT CHANGE UP (ref 5–17)
BUN SERPL-MCNC: 23 MG/DL — SIGNIFICANT CHANGE UP (ref 7–23)
BUN SERPL-MCNC: 24 MG/DL — HIGH (ref 7–23)
CALCIUM SERPL-MCNC: 8.4 MG/DL — SIGNIFICANT CHANGE UP (ref 8.4–10.5)
CALCIUM SERPL-MCNC: 8.6 MG/DL — SIGNIFICANT CHANGE UP (ref 8.4–10.5)
CHLORIDE SERPL-SCNC: 101 MMOL/L — SIGNIFICANT CHANGE UP (ref 96–108)
CHLORIDE SERPL-SCNC: 111 MMOL/L — HIGH (ref 96–108)
CO2 SERPL-SCNC: 26 MMOL/L — SIGNIFICANT CHANGE UP (ref 22–31)
CO2 SERPL-SCNC: 26 MMOL/L — SIGNIFICANT CHANGE UP (ref 22–31)
CREAT SERPL-MCNC: 1.32 MG/DL — HIGH (ref 0.5–1.3)
CREAT SERPL-MCNC: 1.39 MG/DL — HIGH (ref 0.5–1.3)
GLUCOSE BLDC GLUCOMTR-MCNC: 122 MG/DL — HIGH (ref 70–99)
GLUCOSE BLDC GLUCOMTR-MCNC: 124 MG/DL — HIGH (ref 70–99)
GLUCOSE BLDC GLUCOMTR-MCNC: 138 MG/DL — HIGH (ref 70–99)
GLUCOSE BLDC GLUCOMTR-MCNC: 93 MG/DL — SIGNIFICANT CHANGE UP (ref 70–99)
GLUCOSE SERPL-MCNC: 131 MG/DL — HIGH (ref 70–99)
GLUCOSE SERPL-MCNC: 171 MG/DL — HIGH (ref 70–99)
HCT VFR BLD CALC: 34.7 % — LOW (ref 39–50)
HGB BLD-MCNC: 10.8 G/DL — LOW (ref 13–17)
MAGNESIUM SERPL-MCNC: 2.4 MG/DL — SIGNIFICANT CHANGE UP (ref 1.6–2.6)
MCHC RBC-ENTMCNC: 30 PG — SIGNIFICANT CHANGE UP (ref 27–34)
MCHC RBC-ENTMCNC: 31.1 G/DL — LOW (ref 32–36)
MCV RBC AUTO: 96.4 FL — SIGNIFICANT CHANGE UP (ref 80–100)
PHOSPHATE SERPL-MCNC: 3 MG/DL — SIGNIFICANT CHANGE UP (ref 2.5–4.5)
PLATELET # BLD AUTO: 489 K/UL — HIGH (ref 150–400)
POTASSIUM SERPL-MCNC: 3.5 MMOL/L — SIGNIFICANT CHANGE UP (ref 3.5–5.3)
POTASSIUM SERPL-MCNC: 4.3 MMOL/L — SIGNIFICANT CHANGE UP (ref 3.5–5.3)
POTASSIUM SERPL-SCNC: 3.5 MMOL/L — SIGNIFICANT CHANGE UP (ref 3.5–5.3)
POTASSIUM SERPL-SCNC: 4.3 MMOL/L — SIGNIFICANT CHANGE UP (ref 3.5–5.3)
RBC # BLD: 3.6 M/UL — LOW (ref 4.2–5.8)
RBC # FLD: 14 % — SIGNIFICANT CHANGE UP (ref 10.3–16.9)
SODIUM SERPL-SCNC: 138 MMOL/L — SIGNIFICANT CHANGE UP (ref 135–145)
SODIUM SERPL-SCNC: 150 MMOL/L — HIGH (ref 135–145)
WBC # BLD: 11.1 K/UL — HIGH (ref 3.8–10.5)
WBC # FLD AUTO: 11.1 K/UL — HIGH (ref 3.8–10.5)

## 2017-12-24 PROCEDURE — 99233 SBSQ HOSP IP/OBS HIGH 50: CPT | Mod: GC

## 2017-12-24 PROCEDURE — 93970 EXTREMITY STUDY: CPT | Mod: 26

## 2017-12-24 RX ORDER — CLOPIDOGREL BISULFATE 75 MG/1
75 TABLET, FILM COATED ORAL DAILY
Qty: 0 | Refills: 0 | Status: DISCONTINUED | OUTPATIENT
Start: 2017-12-24 | End: 2017-12-29

## 2017-12-24 RX ORDER — ASPIRIN/CALCIUM CARB/MAGNESIUM 324 MG
325 TABLET ORAL DAILY
Qty: 0 | Refills: 0 | Status: DISCONTINUED | OUTPATIENT
Start: 2017-12-24 | End: 2017-12-24

## 2017-12-24 RX ORDER — ASPIRIN/CALCIUM CARB/MAGNESIUM 324 MG
81 TABLET ORAL DAILY
Qty: 0 | Refills: 0 | Status: DISCONTINUED | OUTPATIENT
Start: 2017-12-24 | End: 2017-12-29

## 2017-12-24 RX ORDER — PANTOPRAZOLE SODIUM 20 MG/1
40 TABLET, DELAYED RELEASE ORAL
Qty: 0 | Refills: 0 | Status: DISCONTINUED | OUTPATIENT
Start: 2017-12-24 | End: 2017-12-29

## 2017-12-24 RX ORDER — DIPHENHYDRAMINE HCL 50 MG
25 CAPSULE ORAL ONCE
Qty: 0 | Refills: 0 | Status: COMPLETED | OUTPATIENT
Start: 2017-12-24 | End: 2017-12-24

## 2017-12-24 RX ORDER — METOPROLOL TARTRATE 50 MG
7.5 TABLET ORAL EVERY 4 HOURS
Qty: 0 | Refills: 0 | Status: DISCONTINUED | OUTPATIENT
Start: 2017-12-24 | End: 2017-12-24

## 2017-12-24 RX ORDER — SODIUM CHLORIDE 9 MG/ML
1000 INJECTION, SOLUTION INTRAVENOUS
Qty: 0 | Refills: 0 | Status: DISCONTINUED | OUTPATIENT
Start: 2017-12-24 | End: 2017-12-25

## 2017-12-24 RX ORDER — METOPROLOL TARTRATE 50 MG
50 TABLET ORAL
Qty: 0 | Refills: 0 | Status: DISCONTINUED | OUTPATIENT
Start: 2017-12-24 | End: 2017-12-26

## 2017-12-24 RX ADMIN — CEFEPIME 100 MILLIGRAM(S): 1 INJECTION, POWDER, FOR SOLUTION INTRAMUSCULAR; INTRAVENOUS at 17:08

## 2017-12-24 RX ADMIN — HEPARIN SODIUM 7500 UNIT(S): 5000 INJECTION INTRAVENOUS; SUBCUTANEOUS at 05:42

## 2017-12-24 RX ADMIN — Medication 10 MILLIGRAM(S): at 05:12

## 2017-12-24 RX ADMIN — Medication 81 MILLIGRAM(S): at 11:41

## 2017-12-24 RX ADMIN — CLOPIDOGREL BISULFATE 75 MILLIGRAM(S): 75 TABLET, FILM COATED ORAL at 11:41

## 2017-12-24 RX ADMIN — HEPARIN SODIUM 7500 UNIT(S): 5000 INJECTION INTRAVENOUS; SUBCUTANEOUS at 22:10

## 2017-12-24 RX ADMIN — HYDROMORPHONE HYDROCHLORIDE 0.5 MILLIGRAM(S): 2 INJECTION INTRAMUSCULAR; INTRAVENOUS; SUBCUTANEOUS at 15:07

## 2017-12-24 RX ADMIN — PANTOPRAZOLE SODIUM 40 MILLIGRAM(S): 20 TABLET, DELAYED RELEASE ORAL at 11:42

## 2017-12-24 RX ADMIN — Medication 50 MILLIGRAM(S): at 22:10

## 2017-12-24 RX ADMIN — Medication 25 MILLIGRAM(S): at 22:22

## 2017-12-24 RX ADMIN — Medication 10 MILLIGRAM(S): at 00:36

## 2017-12-24 RX ADMIN — Medication 50 MILLIGRAM(S): at 09:39

## 2017-12-24 RX ADMIN — HEPARIN SODIUM 7500 UNIT(S): 5000 INJECTION INTRAVENOUS; SUBCUTANEOUS at 13:30

## 2017-12-24 RX ADMIN — CEFEPIME 100 MILLIGRAM(S): 1 INJECTION, POWDER, FOR SOLUTION INTRAMUSCULAR; INTRAVENOUS at 02:11

## 2017-12-24 RX ADMIN — Medication 10 MILLIGRAM(S): at 05:41

## 2017-12-24 RX ADMIN — HYDROMORPHONE HYDROCHLORIDE 0.5 MILLIGRAM(S): 2 INJECTION INTRAMUSCULAR; INTRAVENOUS; SUBCUTANEOUS at 15:56

## 2017-12-24 RX ADMIN — CEFEPIME 100 MILLIGRAM(S): 1 INJECTION, POWDER, FOR SOLUTION INTRAMUSCULAR; INTRAVENOUS at 09:39

## 2017-12-24 NOTE — PROGRESS NOTE ADULT - ATTENDING COMMENTS
Pt awake and alert. Passing gas and stool. NGT clamped per vascular. Check Le dopplers. continue care per Vascular team. PT. Pt awake and alert. Passing gas and stool. NGT clamped per vascular. Check Le dopplers. continue care per Vascular team. PT. Renal function improved. Hypernatremia may be secondary to conc defect. Replace free water.

## 2017-12-24 NOTE — PROGRESS NOTE ADULT - ATTENDING COMMENTS
61 yo M poor historian with PMHx of HTN and PVD admitted for RLE necrotic ulcers s/p debridement of RLE arterial ulcers on 12/15/17 and s/p aortofemoral bypass on 12/19/17.    -WBAT, ok to be OOB and ambulate with assistance.  -Elevate RLE, ACE wrap if ok with vascular to control edema.  -Continue Q3-5d NPWT changes  -Dispo per vascular surgery.

## 2017-12-24 NOTE — PROGRESS NOTE ADULT - SUBJECTIVE AND OBJECTIVE BOX
SUBJECTIVE:  Doing well.   No overnight events.     OBJECTIVE:     ** VITAL SIGNS / I&O's **    T(C): 36.9 (12-24-17 @ 08:59), Max: 37.4 (12-23-17 @ 14:03)  T(F): 98.5 (12-24-17 @ 08:59), Max: 99.3 (12-23-17 @ 14:03)  HR: 70 (12-24-17 @ 10:00) (60 - 142)  BP: 142/53 (12-24-17 @ 10:00) (119/55 - 173/72)  RR: 15 (12-24-17 @ 10:00) (11 - 24)  SpO2: 100% (12-24-17 @ 10:00) (91% - 100%)      23 Dec 2017 07:01  -  24 Dec 2017 07:00  --------------------------------------------------------  IN:    IV PiggyBack: 350 mL    Lactated Ringers IV Bolus: 1000 mL    lactated ringers.: 150 mL    sodium chloride 0.225%: 1350 mL  Total IN: 2850 mL    OUT:    Nasoenteral Tube: 200 mL    Voided: 1425 mL  Total OUT: 1625 mL    Total NET: 1225 mL      24 Dec 2017 07:01  -  24 Dec 2017 10:35  --------------------------------------------------------  IN:    dextrose 5%.: 200 mL    IV PiggyBack: 50 mL    sodium chloride 0.225%: 75 mL  Total IN: 325 mL    OUT:    Nasoenteral Tube: 350 mL    Voided: 300 mL  Total OUT: 650 mL    Total NET: -325 mL          ** PHYSICAL EXAM **    -- CONSTITUTIONAL: AOx3. NAD.   -- EXTREMITIES: VAC dressing in place X 4, no change in swelling since yesterday.  -- VASCULAR: no pulses palpated on left lower limb      ** LABS **                 10.8   11.1   )----------(  489       ( 24 Dec 2017 06:17 )               34.7      150    |  111    |  24     ----------------------------<  131        ( 24 Dec 2017 06:17 )  4.3     |  26     |  1.39     Ca    8.6        ( 24 Dec 2017 06:17 )  Phos  3.0       ( 24 Dec 2017 06:17 )  Mg     2.4       ( 24 Dec 2017 06:17 )          CAPILLARY BLOOD GLUCOSE    CARDIAC MARKERS ( 23 Dec 2017 22:21 )  x     / <0.01 ng/mL / 176 U/L / x     / 2.2 ng/mL

## 2017-12-24 NOTE — PROGRESS NOTE ADULT - SUBJECTIVE AND OBJECTIVE BOX
SUBJECTIVE: Pt seen and examined at bedside. +BM/+flatus.  24hr events: ON: Tachycardic to 140s, EKG with artifact, though seems to be AFlutter. Metoprolol 5mg IV x3 given, dilatiazem x1 given. Repeat BMP nl, trops nl, IVF bolus, given, IV dilaudid x1. HR down to 70s, NSR.  12/23: oob, pain better controlled, had 3 bms, NPO per vascular    Vital Signs Last 24 Hrs  T(C): 37.2 (24 Dec 2017 05:58), Max: 37.4 (23 Dec 2017 14:03)  T(F): 99 (24 Dec 2017 05:58), Max: 99.3 (23 Dec 2017 14:03)  HR: 78 (24 Dec 2017 08:00) (60 - 142)  BP: 154/78 (24 Dec 2017 08:00) (119/55 - 173/72)  BP(mean): 97 (24 Dec 2017 08:00) (78 - 123)  RR: 16 (24 Dec 2017 08:00) (11 - 24)  SpO2: 100% (24 Dec 2017 08:00) (91% - 100%)    PHYSICAL EXAM    Gen: NAD, Grossly neuro intact  CV: RRR  Pulm: CTAB  Abd: Soft, some distension, Appropriate TTP at midline incision, incision C/D/I. b/l groin wound VAC in place with good suction.  Ext: b/l LE ulcers noted, wound VAC over right medial knee with good suction. [facility]        LABS:                        10.8   11.1  )-----------( 489      ( 24 Dec 2017 06:17 )             34.7     12-24    150<H>  |  111<H>  |  24<H>  ----------------------------<  131<H>  4.3   |  26  |  1.39<H>    Ca    8.6      24 Dec 2017 06:17  Phos  3.0     12-24  Mg     2.4     12-24      ASSESSMENT AND PLAN    62M w/ HTN & PAD c/b RLE non-healing ulcers, & chronic infrarenal aortic occulsion now s/p aorto-bifem bypass w/ Hemashielf graft 12/19. Pt currently in sinus rhythm, however, was in and out of arrhythmia overnight. Will increase metoprolol to 7.5q4hrs. Electrolytes ok in AM.    Neuro: Dilaudid PRN, Pain Mgmt consult.  CV:  IN, metoprolol 7.5q4h labetalol PRN for SBP >160. LVEF 70%; normal NST. Cards ([facility]) consult.   Pulm: NC, chest PT  GI/FEN: NPO, NGT to LIWS, LR@75, Protonix  : Voids; STEPHON- Downtrending Cr 1.67 from peak 1.93 post-op, (0.8 pre-op)  ID: OM: Cefepime (12/20--) // D/c: Vancomycin (12/13-19), Zosyn (12/13-20). [facility].   Endo: ISS  Heme: SQH,  IN, hold Plavix  PPx: No SCDs, SQH, Duplex 12/16- No DVT, Protonix.  Lines: PIVs, // D/c: L radial a-line (12/19-22), RIJ (12/19-22)  Drains: None // D/c: MEHRAN drains x 2 in bilateral marvin to continuous wall suction  Wound: Bilateral groin incisions w/ Prevena vacs. Midline incision w/ island dressing. R knee wound vac (to be changed by Plastics). BLE wounds w/ WTD Dakins dressing changes by RN daily.   Activity: OOBTC, PT consulted 12/21 SUBJECTIVE: Pt seen and examined at bedside. +BM/+flatus.  24hr events: ON: Tachycardic to 140s, EKG with artifact, though seems to be AFlutter. Metoprolol 5mg IV x3 given, dilatiazem x1 given. Repeat BMP nl, trops nl, IVF bolus, given, IV dilaudid x1. HR down to 70s, NSR.  12/23: oob, pain better controlled, had 3 bms, NPO per vascular    Vital Signs Last 24 Hrs  T(C): 37.2 (24 Dec 2017 05:58), Max: 37.4 (23 Dec 2017 14:03)  T(F): 99 (24 Dec 2017 05:58), Max: 99.3 (23 Dec 2017 14:03)  HR: 78 (24 Dec 2017 08:00) (60 - 142)  BP: 154/78 (24 Dec 2017 08:00) (119/55 - 173/72)  BP(mean): 97 (24 Dec 2017 08:00) (78 - 123)  RR: 16 (24 Dec 2017 08:00) (11 - 24)  SpO2: 100% (24 Dec 2017 08:00) (91% - 100%)    PHYSICAL EXAM    Gen: NAD, Grossly neuro intact  CV: RRR  Pulm: CTAB  Abd: Soft, some distension, Appropriate TTP at midline incision, incision C/D/I. b/l groin wound VAC in place with good suction.  Ext: b/l LE ulcers noted, wound VAC over right medial knee with good suction. Right biphasic DP and PT signal. Left mono-biphasic DP and biphasic PT signal         LABS:                        10.8   11.1  )-----------( 489      ( 24 Dec 2017 06:17 )             34.7     12-24    150<H>  |  111<H>  |  24<H>  ----------------------------<  131<H>  4.3   |  26  |  1.39<H>    Ca    8.6      24 Dec 2017 06:17  Phos  3.0     12-24  Mg     2.4     12-24      ASSESSMENT AND PLAN    62M w/ HTN & PAD c/b RLE non-healing ulcers, & chronic infrarenal aortic occulsion now s/p aorto-bifem bypass w/ Hemashielf graft 12/19. Pt currently in sinus rhythm, however, was in and out of arrhythmia overnight. Will increase metoprolol to 7.5q4hrs. Electrolytes ok in AM.    Neuro: Dilaudid PRN, Pain Mgmt consult.  CV:  [place], metoprolol 7.5q4h labetalol PRN for SBP >160. LVEF 70%; normal NST. Cards ([facility]) consult.   Pulm: NC, chest PT  GI/FEN: NPO, NGT to LIWS, LR@75, Protonix  : Voids; STEPHON- Downtrending Cr 1.67 from peak 1.93 post-op, (0.8 pre-op)  ID: OM: Cefepime (12/20--) // D/c: Vancomycin (12/13-19), Zosyn (12/13-20). ID service ([facility]) consult.   Endo: ISS  Heme: SQH,  [place], hold Plavix  PPx: No SCDs, SQH, Duplex 12/16- No DVT, Protonix.  Lines: PIVs, // D/c: L radial a-line (12/19-22), RIJ (12/19-22)  Drains: None // D/c: MEHRAN drains x 2 in bilateral marvin to continuous wall suction  Wound: Bilateral groin incisions w/ Prevena vacs. Midline incision w/ island dressing. R knee wound vac (to be changed by Plastics). BLE wounds w/ WTD Dakins dressing changes by RN daily.   Activity: OOBTC, PT consulted 12/21

## 2017-12-25 LAB
ANION GAP SERPL CALC-SCNC: 12 MMOL/L — SIGNIFICANT CHANGE UP (ref 5–17)
BUN SERPL-MCNC: 22 MG/DL — SIGNIFICANT CHANGE UP (ref 7–23)
CALCIUM SERPL-MCNC: 8.4 MG/DL — SIGNIFICANT CHANGE UP (ref 8.4–10.5)
CHLORIDE SERPL-SCNC: 105 MMOL/L — SIGNIFICANT CHANGE UP (ref 96–108)
CO2 SERPL-SCNC: 27 MMOL/L — SIGNIFICANT CHANGE UP (ref 22–31)
CREAT SERPL-MCNC: 1.3 MG/DL — SIGNIFICANT CHANGE UP (ref 0.5–1.3)
CULTURE RESULTS: SIGNIFICANT CHANGE UP
CULTURE RESULTS: SIGNIFICANT CHANGE UP
GLUCOSE BLDC GLUCOMTR-MCNC: 118 MG/DL — HIGH (ref 70–99)
GLUCOSE BLDC GLUCOMTR-MCNC: 128 MG/DL — HIGH (ref 70–99)
GLUCOSE BLDC GLUCOMTR-MCNC: 136 MG/DL — HIGH (ref 70–99)
GLUCOSE BLDC GLUCOMTR-MCNC: 152 MG/DL — HIGH (ref 70–99)
GLUCOSE SERPL-MCNC: 161 MG/DL — HIGH (ref 70–99)
HCT VFR BLD CALC: 31.8 % — LOW (ref 39–50)
HGB BLD-MCNC: 10.2 G/DL — LOW (ref 13–17)
MAGNESIUM SERPL-MCNC: 2.2 MG/DL — SIGNIFICANT CHANGE UP (ref 1.6–2.6)
MCHC RBC-ENTMCNC: 30.5 PG — SIGNIFICANT CHANGE UP (ref 27–34)
MCHC RBC-ENTMCNC: 32.1 G/DL — SIGNIFICANT CHANGE UP (ref 32–36)
MCV RBC AUTO: 95.2 FL — SIGNIFICANT CHANGE UP (ref 80–100)
PHOSPHATE SERPL-MCNC: 2.3 MG/DL — LOW (ref 2.5–4.5)
PLATELET # BLD AUTO: 477 K/UL — HIGH (ref 150–400)
POTASSIUM SERPL-MCNC: 3.8 MMOL/L — SIGNIFICANT CHANGE UP (ref 3.5–5.3)
POTASSIUM SERPL-SCNC: 3.8 MMOL/L — SIGNIFICANT CHANGE UP (ref 3.5–5.3)
RBC # BLD: 3.34 M/UL — LOW (ref 4.2–5.8)
RBC # FLD: 13.5 % — SIGNIFICANT CHANGE UP (ref 10.3–16.9)
SODIUM SERPL-SCNC: 144 MMOL/L — SIGNIFICANT CHANGE UP (ref 135–145)
SPECIMEN SOURCE: SIGNIFICANT CHANGE UP
SPECIMEN SOURCE: SIGNIFICANT CHANGE UP
WBC # BLD: 10.3 K/UL — SIGNIFICANT CHANGE UP (ref 3.8–10.5)
WBC # FLD AUTO: 10.3 K/UL — SIGNIFICANT CHANGE UP (ref 3.8–10.5)

## 2017-12-25 RX ORDER — POTASSIUM PHOSPHATE, MONOBASIC POTASSIUM PHOSPHATE, DIBASIC 236; 224 MG/ML; MG/ML
15 INJECTION, SOLUTION INTRAVENOUS EVERY 6 HOURS
Qty: 0 | Refills: 0 | Status: COMPLETED | OUTPATIENT
Start: 2017-12-25 | End: 2017-12-25

## 2017-12-25 RX ORDER — METOPROLOL TARTRATE 50 MG
5 TABLET ORAL ONCE
Qty: 0 | Refills: 0 | Status: COMPLETED | OUTPATIENT
Start: 2017-12-25 | End: 2017-12-25

## 2017-12-25 RX ORDER — SODIUM,POTASSIUM PHOSPHATES 278-250MG
3 POWDER IN PACKET (EA) ORAL ONCE
Qty: 0 | Refills: 0 | Status: COMPLETED | OUTPATIENT
Start: 2017-12-25 | End: 2017-12-27

## 2017-12-25 RX ADMIN — CEFEPIME 100 MILLIGRAM(S): 1 INJECTION, POWDER, FOR SOLUTION INTRAMUSCULAR; INTRAVENOUS at 19:19

## 2017-12-25 RX ADMIN — Medication 81 MILLIGRAM(S): at 16:16

## 2017-12-25 RX ADMIN — Medication 2: at 05:24

## 2017-12-25 RX ADMIN — HEPARIN SODIUM 7500 UNIT(S): 5000 INJECTION INTRAVENOUS; SUBCUTANEOUS at 22:22

## 2017-12-25 RX ADMIN — CLOPIDOGREL BISULFATE 75 MILLIGRAM(S): 75 TABLET, FILM COATED ORAL at 16:16

## 2017-12-25 RX ADMIN — POTASSIUM PHOSPHATE, MONOBASIC POTASSIUM PHOSPHATE, DIBASIC 62.5 MILLIMOLE(S): 236; 224 INJECTION, SOLUTION INTRAVENOUS at 10:09

## 2017-12-25 RX ADMIN — HEPARIN SODIUM 7500 UNIT(S): 5000 INJECTION INTRAVENOUS; SUBCUTANEOUS at 16:15

## 2017-12-25 RX ADMIN — CEFEPIME 100 MILLIGRAM(S): 1 INJECTION, POWDER, FOR SOLUTION INTRAMUSCULAR; INTRAVENOUS at 09:07

## 2017-12-25 RX ADMIN — Medication 50 MILLIGRAM(S): at 19:19

## 2017-12-25 RX ADMIN — Medication 5 MILLIGRAM(S): at 23:26

## 2017-12-25 RX ADMIN — SODIUM CHLORIDE 100 MILLILITER(S): 9 INJECTION, SOLUTION INTRAVENOUS at 05:24

## 2017-12-25 RX ADMIN — POTASSIUM PHOSPHATE, MONOBASIC POTASSIUM PHOSPHATE, DIBASIC 62.5 MILLIMOLE(S): 236; 224 INJECTION, SOLUTION INTRAVENOUS at 22:22

## 2017-12-25 RX ADMIN — CEFEPIME 100 MILLIGRAM(S): 1 INJECTION, POWDER, FOR SOLUTION INTRAMUSCULAR; INTRAVENOUS at 01:12

## 2017-12-25 RX ADMIN — Medication 50 MILLIGRAM(S): at 05:24

## 2017-12-25 RX ADMIN — PANTOPRAZOLE SODIUM 40 MILLIGRAM(S): 20 TABLET, DELAYED RELEASE ORAL at 05:24

## 2017-12-25 RX ADMIN — HEPARIN SODIUM 7500 UNIT(S): 5000 INJECTION INTRAVENOUS; SUBCUTANEOUS at 05:24

## 2017-12-25 NOTE — PROGRESS NOTE ADULT - ATTENDING COMMENTS
63 yo M poor historian with PMHx of HTN and PVD admitted for RLE necrotic ulcers s/p debridement of RLE arterial ulcers on 12/15/17 and s/p aortofemoral bypass on 12/19/17.    -Veraflow to medial knee wound, NPWT with promogran silver to calf and knee wounds.  -Appreciate ID input for knee osteomyelitis management.  -Discussed long term vac therapy to maximize granulation and allow for future STSG.

## 2017-12-25 NOTE — PROGRESS NOTE ADULT - SUBJECTIVE AND OBJECTIVE BOX
S: Pt reports pain is well controlled, no HA/CP/SOB.    Vitals: Vital Signs Last 24 Hrs  T(C): 37.2 (25 Dec 2017 09:30), Max: 37.6 (24 Dec 2017 19:10)  T(F): 98.9 (25 Dec 2017 09:30), Max: 99.7 (24 Dec 2017 19:10)  HR: 70 (25 Dec 2017 09:00) (62 - 92)  BP: 157/67 (25 Dec 2017 09:00) (133/53 - 169/56)  BP(mean): 100 (25 Dec 2017 09:00) (80 - 114)  RR: 18 (25 Dec 2017 09:00) (15 - 21)  SpO2: 100% (25 Dec 2017 09:00) (98% - 100%)            12-24 @ 07:01  -  12-25 @ 07:00  --------------------------------------------------------  IN: 2475 mL / OUT: 1950 mL / NET: 525 mL    12-25 @ 07:01  -  12-25 @ 09:57  --------------------------------------------------------  IN: 150 mL / OUT: 0 mL / NET: 150 mL            Labs:                         10.2   10.3  )-----------( 477      ( 25 Dec 2017 05:38 )             31.8   12-25    144  |  105  |  22  ----------------------------<  161<H>  3.8   |  27  |  1.30    Ca    8.4      25 Dec 2017 05:38  Phos  2.3     12-25  Mg     2.2     12-25        Electrolytes repleated to goals as follows: Potassium 4, Phosphorus 3 and Magnesium 2 where appropriate and necessary    Neuro: A&Ox3, NAD, grossly neuro intact  HEENT: PERRL < EOMI, MMM  Neck: Supple  CV: RRR, no MRGs  Pulm: CTAB, no wheezes, rales ronchi  Abd: BS (+), Soft, appropriately ttp around incision, midline incision with staples c/d/i, no surrounding erythema or ecchymosis, b/l groin wound vacs with good suction and seal intact, ND  Ext: No edema peripherally or centrally, b/l LE ulcers, wound vac with suction intact over R knee, good seal  Vasc: Extremities warm to palpation,  R biphasic popliteal signal, R monophasic PT signal; L palpable DP, L biphasic PT signals  MSK: no joint swelling  Skin: no rash  Psych: affect appropriate

## 2017-12-25 NOTE — PROGRESS NOTE ADULT - ASSESSMENT
62M w/ HTN & PAD c/b RLE non-healing ulcers, & chronic infrarenal aortic occulsion now s/p aorto-bifem bypass w/ Hemashielf graft 12/19.     Neuro: Dilaudid PRN, Pain Mgmt consult.  CV:  [place], metoprolol 50q12, labetalol PRN for SBP >160. LVEF 70%; normal NST. Cards ([facility]) consult.   Pulm: NC, chest PT  GI/FEN: Sips/chips, LR@75, Protonix  : Voids; STEPHON- Downtrending Cr 1.67 from peak 1.93 post-op, (0.8 pre-op)  ID: OM: Cefepime (12/20--) // D/c: Vancomycin (12/13-19), Zosyn (12/13-20). ID service ([facility]) consult.   Endo: ISS  Heme: SQH,  [place], hold Plavix  PPx: No SCDs, SQH, Duplex 12/24 - Calf IM VT, monitor in 5 days for propagation, Protonix.  Lines: PIVs, // D/c: L radial a-line (12/19-22), RIJ (12/19-22)  Drains: None // D/c: MEHRAN drains x 2 in bilateral marvin to continuous wall suction  Wound: Bilateral groin incisions w/ Prevena vacs. Midline incision w/ island dressing. R knee wound vac (to be changed by Plastics). BLE wounds w/ WTD Dakins dressing changes by RN daily.   Activity: OOBTC, PT consulted 12/21

## 2017-12-25 NOTE — PROGRESS NOTE ADULT - SUBJECTIVE AND OBJECTIVE BOX
SUBJECTIVE:  Doing well.   No overnight events.     OBJECTIVE:     ** VITAL SIGNS / I&O's **    T(C): 36.4 (12-25-17 @ 05:33), Max: 37.6 (12-24-17 @ 19:10)  T(F): 97.6 (12-25-17 @ 05:33), Max: 99.7 (12-24-17 @ 19:10)  HR: 70 (12-25-17 @ 09:00) (62 - 92)  BP: 157/67 (12-25-17 @ 09:00) (133/53 - 169/56)  RR: 18 (12-25-17 @ 09:00) (15 - 21)  SpO2: 100% (12-25-17 @ 09:00) (98% - 100%)      24 Dec 2017 07:01  -  25 Dec 2017 07:00  --------------------------------------------------------  IN:    dextrose 5%.: 2300 mL    IV PiggyBack: 100 mL    sodium chloride 0.225%: 75 mL  Total IN: 2475 mL    OUT:    Nasoenteral Tube: 350 mL    Voided: 1600 mL  Total OUT: 1950 mL    Total NET: 525 mL      25 Dec 2017 07:01  -  25 Dec 2017 09:29  --------------------------------------------------------  IN:    dextrose 5%.: 100 mL    IV PiggyBack: 50 mL  Total IN: 150 mL    OUT:  Total OUT: 0 mL    Total NET: 150 mL          ** PHYSICAL EXAM **    -- CONSTITUTIONAL: AOx3. NAD.   -- CARDIOVASCULAR: Regular rate and rhythm. S1, S2.  -- RESPIRATORY: Bilateral breath sounds.   -- EXTREMITIES: rt leg pitting 4+, vac dressing working.    ** LABS **                 10.2   10.3   )----------(  477       ( 25 Dec 2017 05:38 )               31.8      144    |  105    |  22     ----------------------------<  161        ( 25 Dec 2017 05:38 )  3.8     |  27     |  1.30     Ca    8.4        ( 25 Dec 2017 05:38 )  Phos  2.3       ( 25 Dec 2017 05:38 )  Mg     2.2       ( 25 Dec 2017 05:38 )          CAPILLARY BLOOD GLUCOSE

## 2017-12-26 LAB
ANION GAP SERPL CALC-SCNC: 11 MMOL/L — SIGNIFICANT CHANGE UP (ref 5–17)
BUN SERPL-MCNC: 18 MG/DL — SIGNIFICANT CHANGE UP (ref 7–23)
CALCIUM SERPL-MCNC: 8.4 MG/DL — SIGNIFICANT CHANGE UP (ref 8.4–10.5)
CHLORIDE SERPL-SCNC: 102 MMOL/L — SIGNIFICANT CHANGE UP (ref 96–108)
CO2 SERPL-SCNC: 27 MMOL/L — SIGNIFICANT CHANGE UP (ref 22–31)
CREAT SERPL-MCNC: 1.18 MG/DL — SIGNIFICANT CHANGE UP (ref 0.5–1.3)
GLUCOSE BLDC GLUCOMTR-MCNC: 119 MG/DL — HIGH (ref 70–99)
GLUCOSE BLDC GLUCOMTR-MCNC: 146 MG/DL — HIGH (ref 70–99)
GLUCOSE BLDC GLUCOMTR-MCNC: 169 MG/DL — HIGH (ref 70–99)
GLUCOSE BLDC GLUCOMTR-MCNC: 209 MG/DL — HIGH (ref 70–99)
GLUCOSE SERPL-MCNC: 134 MG/DL — HIGH (ref 70–99)
HCT VFR BLD CALC: 33.9 % — LOW (ref 39–50)
HGB BLD-MCNC: 10.9 G/DL — LOW (ref 13–17)
MAGNESIUM SERPL-MCNC: 2.1 MG/DL — SIGNIFICANT CHANGE UP (ref 1.6–2.6)
MCHC RBC-ENTMCNC: 30 PG — SIGNIFICANT CHANGE UP (ref 27–34)
MCHC RBC-ENTMCNC: 32.2 G/DL — SIGNIFICANT CHANGE UP (ref 32–36)
MCV RBC AUTO: 93.4 FL — SIGNIFICANT CHANGE UP (ref 80–100)
PHOSPHATE SERPL-MCNC: 3.3 MG/DL — SIGNIFICANT CHANGE UP (ref 2.5–4.5)
PLATELET # BLD AUTO: 472 K/UL — HIGH (ref 150–400)
POTASSIUM SERPL-MCNC: 3.8 MMOL/L — SIGNIFICANT CHANGE UP (ref 3.5–5.3)
POTASSIUM SERPL-SCNC: 3.8 MMOL/L — SIGNIFICANT CHANGE UP (ref 3.5–5.3)
RBC # BLD: 3.63 M/UL — LOW (ref 4.2–5.8)
RBC # FLD: 13.6 % — SIGNIFICANT CHANGE UP (ref 10.3–16.9)
SODIUM SERPL-SCNC: 140 MMOL/L — SIGNIFICANT CHANGE UP (ref 135–145)
SURGICAL PATHOLOGY STUDY: SIGNIFICANT CHANGE UP
WBC # BLD: 10 K/UL — SIGNIFICANT CHANGE UP (ref 3.8–10.5)
WBC # FLD AUTO: 10 K/UL — SIGNIFICANT CHANGE UP (ref 3.8–10.5)

## 2017-12-26 PROCEDURE — 93010 ELECTROCARDIOGRAM REPORT: CPT

## 2017-12-26 PROCEDURE — 99232 SBSQ HOSP IP/OBS MODERATE 35: CPT

## 2017-12-26 RX ORDER — METOPROLOL TARTRATE 50 MG
5 TABLET ORAL ONCE
Qty: 0 | Refills: 0 | Status: COMPLETED | OUTPATIENT
Start: 2017-12-26 | End: 2017-12-26

## 2017-12-26 RX ORDER — SODIUM HYPOCHLORITE 0.125 %
1 SOLUTION, NON-ORAL MISCELLANEOUS ONCE
Qty: 0 | Refills: 0 | Status: DISCONTINUED | OUTPATIENT
Start: 2017-12-26 | End: 2017-12-29

## 2017-12-26 RX ORDER — DIPHENHYDRAMINE HCL 50 MG
25 CAPSULE ORAL ONCE
Qty: 0 | Refills: 0 | Status: COMPLETED | OUTPATIENT
Start: 2017-12-26 | End: 2017-12-26

## 2017-12-26 RX ORDER — POTASSIUM CHLORIDE 20 MEQ
10 PACKET (EA) ORAL
Qty: 0 | Refills: 0 | Status: COMPLETED | OUTPATIENT
Start: 2017-12-26 | End: 2017-12-26

## 2017-12-26 RX ORDER — SODIUM CHLORIDE 9 MG/ML
1000 INJECTION INTRAMUSCULAR; INTRAVENOUS; SUBCUTANEOUS
Qty: 0 | Refills: 0 | Status: DISCONTINUED | OUTPATIENT
Start: 2017-12-26 | End: 2017-12-27

## 2017-12-26 RX ORDER — METOPROLOL TARTRATE 50 MG
50 TABLET ORAL THREE TIMES A DAY
Qty: 0 | Refills: 0 | Status: DISCONTINUED | OUTPATIENT
Start: 2017-12-26 | End: 2017-12-26

## 2017-12-26 RX ORDER — BACITRACIN ZINC 500 UNIT/G
1 OINTMENT IN PACKET (EA) TOPICAL
Qty: 0 | Refills: 0 | Status: DISCONTINUED | OUTPATIENT
Start: 2017-12-26 | End: 2017-12-29

## 2017-12-26 RX ORDER — METOPROLOL TARTRATE 50 MG
50 TABLET ORAL
Qty: 0 | Refills: 0 | Status: DISCONTINUED | OUTPATIENT
Start: 2017-12-26 | End: 2017-12-29

## 2017-12-26 RX ADMIN — Medication 81 MILLIGRAM(S): at 12:27

## 2017-12-26 RX ADMIN — PANTOPRAZOLE SODIUM 40 MILLIGRAM(S): 20 TABLET, DELAYED RELEASE ORAL at 07:28

## 2017-12-26 RX ADMIN — Medication 100 MILLIEQUIVALENT(S): at 07:28

## 2017-12-26 RX ADMIN — Medication 50 MILLIGRAM(S): at 19:05

## 2017-12-26 RX ADMIN — HEPARIN SODIUM 7500 UNIT(S): 5000 INJECTION INTRAVENOUS; SUBCUTANEOUS at 22:29

## 2017-12-26 RX ADMIN — Medication: at 12:59

## 2017-12-26 RX ADMIN — Medication 110 MILLIGRAM(S): at 00:06

## 2017-12-26 RX ADMIN — HYDROMORPHONE HYDROCHLORIDE 1 MILLIGRAM(S): 2 INJECTION INTRAMUSCULAR; INTRAVENOUS; SUBCUTANEOUS at 11:13

## 2017-12-26 RX ADMIN — Medication 100 MILLIEQUIVALENT(S): at 12:26

## 2017-12-26 RX ADMIN — Medication 2: at 22:30

## 2017-12-26 RX ADMIN — HEPARIN SODIUM 7500 UNIT(S): 5000 INJECTION INTRAVENOUS; SUBCUTANEOUS at 17:10

## 2017-12-26 RX ADMIN — Medication 5 MILLIGRAM(S): at 12:57

## 2017-12-26 RX ADMIN — Medication 50 MILLIGRAM(S): at 12:27

## 2017-12-26 RX ADMIN — Medication 25 MILLIGRAM(S): at 23:21

## 2017-12-26 RX ADMIN — CEFEPIME 100 MILLIGRAM(S): 1 INJECTION, POWDER, FOR SOLUTION INTRAMUSCULAR; INTRAVENOUS at 19:09

## 2017-12-26 RX ADMIN — CEFEPIME 100 MILLIGRAM(S): 1 INJECTION, POWDER, FOR SOLUTION INTRAMUSCULAR; INTRAVENOUS at 04:10

## 2017-12-26 RX ADMIN — Medication 50 MILLIGRAM(S): at 07:27

## 2017-12-26 RX ADMIN — CLOPIDOGREL BISULFATE 75 MILLIGRAM(S): 75 TABLET, FILM COATED ORAL at 19:09

## 2017-12-26 RX ADMIN — Medication 1 APPLICATION(S): at 19:09

## 2017-12-26 RX ADMIN — SODIUM CHLORIDE 100 MILLILITER(S): 9 INJECTION INTRAMUSCULAR; INTRAVENOUS; SUBCUTANEOUS at 12:26

## 2017-12-26 RX ADMIN — HYDROMORPHONE HYDROCHLORIDE 1 MILLIGRAM(S): 2 INJECTION INTRAMUSCULAR; INTRAVENOUS; SUBCUTANEOUS at 11:30

## 2017-12-26 RX ADMIN — HEPARIN SODIUM 7500 UNIT(S): 5000 INJECTION INTRAVENOUS; SUBCUTANEOUS at 07:27

## 2017-12-26 RX ADMIN — Medication 5 MILLIGRAM(S): at 00:30

## 2017-12-26 NOTE — PROGRESS NOTE ADULT - SUBJECTIVE AND OBJECTIVE BOX
Chief Complaint/Reason for Consult: periop cv mgmt  INTERVAL HPI: events ntoed for tachy cardia and hypertension  	  MEDICATIONS:  labetalol Injectable 10 milliGRAM(s) IV Push every 6 hours PRN  metoprolol     tartrate 50 milliGRAM(s) Oral two times a day    cefepime  IVPB      cefepime  IVPB 2000 milliGRAM(s) IV Intermittent every 8 hours      HYDROmorphone  Injectable 0.5 milliGRAM(s) IV Push every 4 hours PRN  HYDROmorphone  Injectable 1 milliGRAM(s) IV Push daily  HYDROmorphone  Injectable 1 milliGRAM(s) IV Push every 4 hours PRN    bisacodyl Suppository 10 milliGRAM(s) Rectal once PRN  pantoprazole    Tablet 40 milliGRAM(s) Oral before breakfast    insulin lispro (HumaLOG) corrective regimen sliding scale   SubCutaneous every 6 hours    aspirin  chewable 81 milliGRAM(s) Oral daily  clopidogrel Tablet 75 milliGRAM(s) Oral daily  Dakins Solution - 1/4 Strength 1 Application(s) Topical daily  Dakins Solution - Full Strength 1 Application(s) Topical once  heparin  Injectable 7500 Unit(s) SubCutaneous every 8 hours  potassium acid phosphate/sodium acid phosphate tablet (K-PHOS No. 2) 3 Tablet(s) Oral once  potassium chloride  10 mEq/100 mL IVPB 10 milliEquivalent(s) IV Intermittent every 2 hours      REVIEW OF SYSTEMS:  [x] As per HPI  CONSTITUTIONAL: No fever, weight loss, or fatigue  RESPIRATORY: No cough, wheezing, chills or hemoptysis; No Shortness of Breath  CARDIOVASCULAR: No chest pain, palpitations, dizziness, or leg swelling  GASTROINTESTINAL: No abdominal or epigastric pain. No nausea, vomiting, or hematemesis; No diarrhea or constipation. No melena or hematochezia.  MUSCULOSKELETAL: No joint pain or swelling; No muscle, back, or extremity pain  [x] All others negative	  [ ] Unable to obtain    PHYSICAL EXAM:  T(C): 36.6 (12-26-17 @ 08:26), Max: 37.7 (12-25-17 @ 22:23)  HR: 68 (12-26-17 @ 06:16) (68 - 130)  BP: 166/74 (12-26-17 @ 06:16) (142/93 - 175/90)  RR: 18 (12-26-17 @ 06:16) (18 - 18)  SpO2: 100% (12-26-17 @ 00:45) (100% - 100%)  Wt(kg): --  I&O's Summary    25 Dec 2017 07:01  -  26 Dec 2017 07:00  --------------------------------------------------------  IN: 570 mL / OUT: 400 mL / NET: 170 mL          Appearance: Normal	  HEENT:   Normal oral mucosa  Cardiovascular: Normal S1 S2, No JVD, No murmurs, No edema  Respiratory: Lungs clear to auscultation	  Gastrointestinal:  Soft, Non-tender, + BS	  Extremities: Normal range of motion, No clubbing, cyanosis or edema  Vascular: Peripheral pulses palpable 2+ bilaterally    TELEMETRY: 	    ECG:   	  RADIOLOGY:   CXR:  CT:  US:    CARDIAC TESTING:  Echocardiogram:  Catheterization:  Stress Test:      LABS:	 	    CARDIAC MARKERS:                                  10.9   10.0  )-----------( 472      ( 26 Dec 2017 02:59 )             33.9     12-26    140  |  102  |  18  ----------------------------<  134<H>  3.8   |  27  |  1.18    Ca    8.4      26 Dec 2017 02:59  Phos  3.3     12-26  Mg     2.1     12-26      proBNP:   Lipid Profile:   HgA1c:   TSH:     ASSESSMENT/PLAN: 	    #SVT - recommend increaing BB to 50mg TID, EP consult [id number], and hydration as pt clinically dry on exam    #HTN - pain control, consider pain mgmt consult Hydralazine PRN SBP>180 sustained    #CV Prevention -   q3mo Fasting Lipid Profile, Goal LDL<100, statin as tolerated.  q6week TSH check  q3mo 25-OHD Vitamin D Level, Goal 50, supplement as tolerated

## 2017-12-26 NOTE — CONSULT NOTE ADULT - SUBJECTIVE AND OBJECTIVE BOX
CHIEF COMPLAINT: non healing ulcers    HISTORY OF PRESENT ILLNESS: 61 y/o M w/ HTN & PAD c/b RLE non-healing ulcers s/p debridement & chronic infrarenal aortic occulsion now s/p aorto-bifem bypass w/ Hemashielf graft 12/19. Called to evaluate for episodic ‘sinus tachycardia’ on telemetry, started on Lopressor, uptitrated to 50 mg TID.  Patient denies any h/o arrhythmia, denies awareness of rapid/irregular heart action during admission.  No acute SOB, CP, dizziness, presyncope or syncope.  Reports postural dizziness when working with PT earlier today.        PAST MEDICAL & SURGICAL HISTORY:  HTN (hypertension)  Cellulitis    PERTINENT DIAGNOSTIC TESTING:    < from: Echocardiogram (12.14.17 @ 10:06) >  EXAM:  ECHOCARDIOGRAM (CARDIOL)                          PROCEDURE DATE:  12/14/2017    Interpretation Summary  There is borderline concentric left ventricular hypertrophy.The left   ventricular wall motion is normal.The left ventricular ejection fraction   is   normal.The left ventricular ejection fraction is 70%.The right ventricle   is   normal in size and function.The left atrial size is normal.Right atrial   size   is normal.Mildly calciifed trileaflet aortic valve.No aortic   regurgitation   noted.Structurally normal mitral valve.No mitral regurgitation   noted.Structurally normal tricuspid valve.No tricuspid regurgitation   noted.There was insufficient TR detected from which to calculate   pulmonary   artery systolic pressure.  The pulmonic valve is not well   visualized.There is   no pericardial effusion.    Allergies  No Known Allergies  Intolerances    	  MEDICATIONS:  labetalol Injectable 10 milliGRAM(s) IV Push every 6 hours PRN  metoprolol     tartrate 50 milliGRAM(s) Oral three times a day  cefepime  IVPB      cefepime  IVPB 2000 milliGRAM(s) IV Intermittent every 8 hours  HYDROmorphone  Injectable 0.5 milliGRAM(s) IV Push every 4 hours PRN  HYDROmorphone  Injectable 1 milliGRAM(s) IV Push daily  HYDROmorphone  Injectable 1 milliGRAM(s) IV Push every 4 hours PRN  bisacodyl Suppository 10 milliGRAM(s) Rectal once PRN  pantoprazole    Tablet 40 milliGRAM(s) Oral before breakfast  insulin lispro (HumaLOG) corrective regimen sliding scale   SubCutaneous every 6 hours  aspirin  chewable 81 milliGRAM(s) Oral daily  BACItracin   Ointment 1 Application(s) Topical two times a day  clopidogrel Tablet 75 milliGRAM(s) Oral daily  Dakins Solution - 1/4 Strength 1 Application(s) Topical daily  Dakins Solution - Full Strength 1 Application(s) Topical once  heparin  Injectable 7500 Unit(s) SubCutaneous every 8 hours  potassium acid phosphate/sodium acid phosphate tablet (K-PHOS No. 2) 3 Tablet(s) Oral once  sodium chloride 0.9%. 1000 milliLiter(s) IV Continuous <Continuous>      FAMILY HISTORY:      SOCIAL HISTORY:    [x ] Non-smoker    REVIEW OF SYSTEMS:  [x ] All others negative	  [ ] Unable to obtain    PHYSICAL EXAM:  T(C): 36.2 (12-26-17 @ 14:52), Max: 37.7 (12-25-17 @ 22:23)  HR: 129 (12-26-17 @ 14:02) (68 - 137)  BP: 147/82 (12-26-17 @ 14:02) (142/81 - 175/90)  RR: 18 (12-26-17 @ 12:30) (18 - 18)  SpO2: 100% (12-26-17 @ 00:45) (100% - 100%)  Wt(kg): --  I&O's Summary    25 Dec 2017 07:01  -  26 Dec 2017 07:00  --------------------------------------------------------  IN: 570 mL / OUT: 400 mL / NET: 170 mL    26 Dec 2017 07:01  -  26 Dec 2017 16:32  --------------------------------------------------------  IN: 0 mL / OUT: 650 mL / NET: -650 mL        TELEMETRY: 	  SR with paroxysmal SVT - atrial tach vs atrial flutter up to 130s  ECG: AT vs AFL    A&Ox3 no FD  CVS s1s2 tachy, regular  Pulm CTA b/l  ABD softly distended + BS  Ext wound vac in place    	  LABS:	 	    CARDIAC MARKERS:                                  10.9   10.0  )-----------( 472      ( 26 Dec 2017 02:59 )             33.9     12-26    140  |  102  |  18  ----------------------------<  134<H>  3.8   |  27  |  1.18    Ca    8.4      26 Dec 2017 02:59  Phos  3.3     12-26  Mg     2.1     12-26      proBNP:   Lipid Profile:   HgA1c:   TSH:     ASSESSMENT/PLAN:  	61 y/o M w/ HTN & PAD c/b RLE non-healing ulcers s/p debridement & chronic infrarenal aortic occulsion now s/p aorto-bifem bypass w/ Hemashielf graft 12/19.  Now with paroxysmal SVT - atrial tachycardia versus atrial flutter on telemetry.  Agree with beta-blocker- would d/c Lopressor and start Toprol XL 50 mg BID for improved rate control.  May consider Adenosine tomorrow to better differentiate type of arrhythmia if tachycardia persists (to guide management for oral anticoagulation).  Will continue to follow with you. CHIEF COMPLAINT: non healing ulcers    HISTORY OF PRESENT ILLNESS: 61 y/o M w/ HTN & PAD c/b RLE non-healing ulcers s/p debridement & chronic infrarenal aortic occulsion now s/p aorto-bifem bypass w/ Hemashielf graft 12/19. Called to evaluate for episodic ‘sinus tachycardia’ on telemetry, started on Lopressor, uptitrated to 50 mg TID.  Patient denies any h/o arrhythmia, denies awareness of rapid/irregular heart action during admission.  No acute SOB, CP, dizziness, presyncope or syncope.  Reports postural dizziness when working with PT earlier today.       Lexiscan 12/14- normal LVSF w/ no regional WMA. EKG stress test 12/14- normal. Echo 12/17- LVEF 70% w/ no WMA.       PAST MEDICAL & SURGICAL HISTORY:  HTN (hypertension)  Cellulitis    PERTINENT DIAGNOSTIC TESTING:    < from: Echocardiogram (12.14.17 @ 10:06) >  EXAM:  ECHOCARDIOGRAM (CARDIOL)                          PROCEDURE DATE:  12/14/2017    Interpretation Summary  There is borderline concentric left ventricular hypertrophy.The left   ventricular wall motion is normal.The left ventricular ejection fraction   is   normal.The left ventricular ejection fraction is 70%.The right ventricle   is   normal in size and function.The left atrial size is normal.Right atrial   size   is normal.Mildly calciifed trileaflet aortic valve.No aortic   regurgitation   noted.Structurally normal mitral valve.No mitral regurgitation   noted.Structurally normal tricuspid valve.No tricuspid regurgitation   noted.There was insufficient TR detected from which to calculate   pulmonary   artery systolic pressure.  The pulmonic valve is not well   visualized.There is   no pericardial effusion.    Allergies  No Known Allergies  Intolerances    	  MEDICATIONS:  labetalol Injectable 10 milliGRAM(s) IV Push every 6 hours PRN  metoprolol     tartrate 50 milliGRAM(s) Oral three times a day  cefepime  IVPB      cefepime  IVPB 2000 milliGRAM(s) IV Intermittent every 8 hours  HYDROmorphone  Injectable 0.5 milliGRAM(s) IV Push every 4 hours PRN  HYDROmorphone  Injectable 1 milliGRAM(s) IV Push daily  HYDROmorphone  Injectable 1 milliGRAM(s) IV Push every 4 hours PRN  bisacodyl Suppository 10 milliGRAM(s) Rectal once PRN  pantoprazole    Tablet 40 milliGRAM(s) Oral before breakfast  insulin lispro (HumaLOG) corrective regimen sliding scale   SubCutaneous every 6 hours  aspirin  chewable 81 milliGRAM(s) Oral daily  BACItracin   Ointment 1 Application(s) Topical two times a day  clopidogrel Tablet 75 milliGRAM(s) Oral daily  Dakins Solution - 1/4 Strength 1 Application(s) Topical daily  Dakins Solution - Full Strength 1 Application(s) Topical once  heparin  Injectable 7500 Unit(s) SubCutaneous every 8 hours  potassium acid phosphate/sodium acid phosphate tablet (K-PHOS No. 2) 3 Tablet(s) Oral once  sodium chloride 0.9%. 1000 milliLiter(s) IV Continuous <Continuous>      FAMILY HISTORY:      SOCIAL HISTORY:    [x ] Non-smoker    REVIEW OF SYSTEMS:  [x ] All others negative	  [ ] Unable to obtain    PHYSICAL EXAM:  T(C): 36.2 (12-26-17 @ 14:52), Max: 37.7 (12-25-17 @ 22:23)  HR: 129 (12-26-17 @ 14:02) (68 - 137)  BP: 147/82 (12-26-17 @ 14:02) (142/81 - 175/90)  RR: 18 (12-26-17 @ 12:30) (18 - 18)  SpO2: 100% (12-26-17 @ 00:45) (100% - 100%)  Wt(kg): --  I&O's Summary    25 Dec 2017 07:01  -  26 Dec 2017 07:00  --------------------------------------------------------  IN: 570 mL / OUT: 400 mL / NET: 170 mL    26 Dec 2017 07:01  -  26 Dec 2017 16:32  --------------------------------------------------------  IN: 0 mL / OUT: 650 mL / NET: -650 mL        TELEMETRY: 	  SR with paroxysmal SVT - atrial tach vs atrial flutter up to 130s  ECG: AT vs AFL    A&Ox3 no FD  CVS s1s2 tachy, regular  Pulm CTA b/l  ABD softly distended + BS  Ext wound vac in place    	  LABS:	 	    CARDIAC MARKERS:                                  10.9   10.0  )-----------( 472      ( 26 Dec 2017 02:59 )             33.9     12-26    140  |  102  |  18  ----------------------------<  134<H>  3.8   |  27  |  1.18    Ca    8.4      26 Dec 2017 02:59  Phos  3.3     12-26  Mg     2.1     12-26      proBNP:   Lipid Profile:   HgA1c:   TSH:     ASSESSMENT/PLAN:  	61 y/o M w/ HTN & PAD c/b RLE non-healing ulcers s/p debridement & chronic infrarenal aortic occulsion now s/p aorto-bifem bypass w/ Hemashielf graft 12/19.  Now with paroxysmal SVT - atrial tachycardia versus atrial flutter on telemetry.  Agree with beta-blocker- would d/c Lopressor and start Toprol XL 50 mg BID for improved rate control.  May consider Adenosine tomorrow to better differentiate type of arrhythmia if tachycardia persists (to guide management for oral anticoagulation).  Will continue to follow with you.

## 2017-12-26 NOTE — CHART NOTE - NSCHARTNOTEFT_GEN_A_CORE
Admitting Diagnosis:   62M w/ HTN & PAD c/b RLE non-healing ulcers, & chronic infrarenal aortic occulsion now s/p aorto-bifem bypass w/ Hemashielf graft 12/19.      PAST MEDICAL & SURGICAL HISTORY:  HTN (hypertension)  Cellulitis      Current Nutrition Order:   Diet, Regular (12-26-17 @ 08:29)      PO Intake: Good (%) [   ]  Fair (50-75%) [  X ] Poor (<25%) [   ]    GI Issues: No N/V/C/D reported at this time. Last BM 12/26.     Pain: No pain endorsed at this time; on pain regimen     Skin Integrity: B/L LE ulcers, R. knee ulcer w/wound vac, sacrum stage I pressure ulcer     Labs:   12-26    140  |  102  |  18  ----------------------------<  134<H>  3.8   |  27  |  1.18    Ca    8.4      26 Dec 2017 02:59  Phos  3.3     12-26  Mg     2.1     12-26      CAPILLARY BLOOD GLUCOSE      POCT Blood Glucose.: 209 mg/dL (26 Dec 2017 10:42)  POCT Blood Glucose.: 146 mg/dL (26 Dec 2017 06:21)  POCT Blood Glucose.: 118 mg/dL (25 Dec 2017 21:22)  POCT Blood Glucose.: 128 mg/dL (25 Dec 2017 16:12)      Medications:  MEDICATIONS  (STANDING):  aspirin  chewable 81 milliGRAM(s) Oral daily  BACItracin   Ointment 1 Application(s) Topical two times a day  cefepime  IVPB      cefepime  IVPB 2000 milliGRAM(s) IV Intermittent every 8 hours  clopidogrel Tablet 75 milliGRAM(s) Oral daily  Dakins Solution - 1/4 Strength 1 Application(s) Topical daily  Dakins Solution - Full Strength 1 Application(s) Topical once  heparin  Injectable 7500 Unit(s) SubCutaneous every 8 hours  HYDROmorphone  Injectable 1 milliGRAM(s) IV Push daily  insulin lispro (HumaLOG) corrective regimen sliding scale   SubCutaneous every 6 hours  metoprolol     tartrate 50 milliGRAM(s) Oral three times a day  pantoprazole    Tablet 40 milliGRAM(s) Oral before breakfast  potassium acid phosphate/sodium acid phosphate tablet (K-PHOS No. 2) 3 Tablet(s) Oral once  sodium chloride 0.9%. 1000 milliLiter(s) (100 mL/Hr) IV Continuous <Continuous>    MEDICATIONS  (PRN):  bisacodyl Suppository 10 milliGRAM(s) Rectal once PRN Constipation  HYDROmorphone  Injectable 0.5 milliGRAM(s) IV Push every 4 hours PRN Moderate Pain (4 - 6)  HYDROmorphone  Injectable 1 milliGRAM(s) IV Push every 4 hours PRN Severe Pain (7 - 10) or 30 minutes prior to getting out of bed  labetalol Injectable 10 milliGRAM(s) IV Push every 6 hours PRN Systolic blood pressure > 160      Weight: 108.9kg  Daily     Daily     Weight Change: Wt stable; no new weights since admit    Estimated energy needs: Ideal body weight used for calculations as pt >120% of IBW. Protein needs increased 2/2 nonhealing ulcers & for post op needs.   Calories: 25-30 kcal/kg = 9725-7994 kcal/day  Protein: 1.2-1.4 g/kg = 107-125 g protein/day   Fluids: 30-35 mL/kg = 7257-2049 mL fluid/day    Subjective: 63 yo/male w/ HTN & PAD c/b RLE non-healing ulcers, & chronic infrarenal aortic occulsion now s/p aorto-bifem bypass w/ Hemashielf graft 12/19. S/P extubation 12/20. Transferred back to [facility]. Pt seen in room, awake, alert, family at bedside. Pt tolerating Regular diet, no difficulty chewing or swallowing. No N/V/C/D reported at this time. Wound vac in place, changed today by PA and pt had episode of tachycardia. Pt denies pain at this time. Na, K, Mg WNL.     Previous Nutrition Diagnosis: Increased kcals and protein needs RT increased needs for wound healing AEB 3 non-healing RLE ulcers    Active [ X  ]  Resolved [   ]    Goal: Pt to continue to meet 75% or greater PO intake per meal with +tolerance     Recommendations:  1. Recommend changing diet to DASH/TLC   2. Encourage PO intake    Education: N/A this visit, RD educated pt on prior visit.     Risk Level: High [ X  ] Moderate [   ] Low [   ].

## 2017-12-26 NOTE — PROGRESS NOTE ADULT - SUBJECTIVE AND OBJECTIVE BOX
24hr Events:  O/N: Continues to pass gas, tolerating CLD well, HR 130s, EKG performed revealed sinus tachy, given metoprolol 5mg IV x3 doses after which rhythm change NSR, Pt was completely asymptomatic the whole time.  12/25: started CLD, tolerating; DC fluids;       Assessment/Plan:  62M w/ HTN & PAD c/b RLE non-healing ulcers, & chronic infrarenal aortic occulsion now s/p aorto-bifem bypass w/ Hemashielf graft 12/19.     Neuro: Dilaudid PRN, Pain Mgmt consult.  CV:  SC, metoprolol 50q12, labetalol PRN for SBP >160. LVEF 70%; normal NST. Cards ([facility]) consult.   Pulm: NC, chest PT  GI/FEN: CLD, Protonix  : Voids; STEPHON- Downtrending Cr 1.67 from peak 1.93 post-op, (0.8 pre-op)  ID: OM: Cefepime (12/20--) // D/c: Vancomycin (12/13-19), Zosyn (12/13-20). ID service ([facility]) consult.   Endo: ISS  Heme: SQH,  SC, Plavix 75mg  PPx: No SCDs, SQH, Duplex 12/24 - Calf IM VT, monitor in 5 dDrains: None // D/c: MEHRAN drains x 2 in bilateral marvin to continuous wall suction  Wound: Bilateral groin incisions w/ Prevena vacs. Midline incision w/ island dressing. R knee wound vac (to be changed by Plastics). BLE wounds w/ WTD Dakins dressing changes by RN daily.   Activity: OOBTC, PT consulted 12/21 24hr Events:  O/N: Continues to pass gas, tolerating CLD well, HR 130s, EKG performed revealed sinus tachy, given metoprolol 5mg IV x3 doses after which rhythm change NSR, Pt was completely asymptomatic the whole time.  12/25: started CLD, tolerating; DC fluids;     cefepime  IVPB   cefepime  IVPB 2000  aspirin  chewable 81  cefepime  IVPB   cefepime  IVPB 2000  clopidogrel Tablet 75  diltiazem Injectable 27  heparin  Injectable 7500  labetalol Injectable 10 PRN  metoprolol     tartrate 50      Allergies    No Known Allergies    Intolerances        Vital Signs Last 24 Hrs  T(C): 35.8 (26 Dec 2017 05:00), Max: 37.7 (25 Dec 2017 22:23)  T(F): 96.5 (26 Dec 2017 05:00), Max: 99.8 (25 Dec 2017 22:23)  HR: 68 (26 Dec 2017 06:16) (62 - 130)  BP: 166/74 (26 Dec 2017 06:16) (134/68 - 175/90)  BP(mean): 79 (25 Dec 2017 10:00) (79 - 113)  RR: 18 (26 Dec 2017 06:16) (16 - 21)  SpO2: 100% (26 Dec 2017 00:45) (100% - 100%)  I&O's Summary    25 Dec 2017 07:01  -  26 Dec 2017 07:00  --------------------------------------------------------  IN: 570 mL / OUT: 400 mL / NET: 170 mL        Physical Exam:  General: alert and awake, NAD  Pulmonary:  Cardiovascular:  Abdominal: distended, soft, non tender, incision C/D/I, bilat groins with provena VAC intact  Extremities: RLE: wounds with VAC in place x 2, no surrounding erythema/drainage/warmth  Pulses:   Right:                                                                          Left:  FEM [ ]2+ [ ]1+ [ ]doppler                                             FEM [ ]2+ [ ]1+ [ ]doppler    POP [ ]2+ [ ]1+ [ ]doppler                                             POP [ ]2+ [ ]1+ [ ]doppler    DP [ ]2+ [ ]1+ [ ]doppler                                                DP [ ]2+ [ ]1+ [ ]doppler  PT[ ]2+ [ ]1+ [ ]doppler                                                  PT [ ]2+ [ ]1+ [ ]doppler      LABS:                        10.9   10.0  )-----------( 472      ( 26 Dec 2017 02:59 )             33.9     12-26    140  |  102  |  18  ----------------------------<  134<H>  3.8   |  27  |  1.18    Ca    8.4      26 Dec 2017 02:59  Phos  3.3     12-26  Mg     2.1     12-26        Assessment/Plan:  62M w/ HTN & PAD c/b RLE non-healing ulcers, & chronic infrarenal aortic occulsion now s/p aorto-bifem bypass w/ Hemashielf graft 12/19.     Pain control  Home meds  Clear liquid diet, might advance to reg  : Voids; STEPHON- Downtrending Cr 1.67 from peak 1.93 post-op, (0.8 pre-op)  OM: Cefepime (12/20--) ID service ([facility]) following  ISS  DVT ppx wSQH   NH, Plavix 75mg  Wound: Bilateral groin incisions w/ Prevena vacs. Midline incision w/ island dressing. R knee wound vac (to be changed by Plastics).   BLE wounds w/ WTD Dakins dressing changes by RN daily.   OOBTC w/PT 24hr Events:  O/N: Continues to pass gas, tolerating CLD well, HR 130s, EKG performed revealed sinus tachy, given metoprolol 5mg IV x3 doses after which rhythm change NSR, Pt was completely asymptomatic the whole time.  12/25: started CLD, tolerating; DC fluids;     cefepime  IVPB   cefepime  IVPB 2000  aspirin  chewable 81  cefepime  IVPB   cefepime  IVPB 2000  clopidogrel Tablet 75  diltiazem Injectable 27  heparin  Injectable 7500  labetalol Injectable 10 PRN  metoprolol     tartrate 50      Allergies    No Known Allergies    Intolerances        Vital Signs Last 24 Hrs  T(C): 35.8 (26 Dec 2017 05:00), Max: 37.7 (25 Dec 2017 22:23)  T(F): 96.5 (26 Dec 2017 05:00), Max: 99.8 (25 Dec 2017 22:23)  HR: 68 (26 Dec 2017 06:16) (62 - 130)  BP: 166/74 (26 Dec 2017 06:16) (134/68 - 175/90)  BP(mean): 79 (25 Dec 2017 10:00) (79 - 113)  RR: 18 (26 Dec 2017 06:16) (16 - 21)  SpO2: 100% (26 Dec 2017 00:45) (100% - 100%)  I&O's Summary    25 Dec 2017 07:01  -  26 Dec 2017 07:00  --------------------------------------------------------  IN: 570 mL / OUT: 400 mL / NET: 170 mL        Physical Exam:  General: alert and awake, NAD  Pulmonary:  Cardiovascular:  Abdominal: distended, soft, non tender, incision C/D/I, bilat groins with provena VAC intact  Extremities: RLE: wounds with VAC in place x 2, no surrounding erythema/drainage/warmth  Pulses:   Right:                                                                          Left:  FEM [ ]2+ [ ]1+ [ ]doppler                                             FEM [ ]2+ [ ]1+ [ ]doppler    POP [ ]2+ [ ]1+ [ ]doppler                                             POP [ ]2+ [ ]1+ [ ]doppler    DP [ ]2+ [ ]1+ [ ]doppler                                                DP [ ]2+ [ ]1+ [ ]doppler  PT[ ]2+ [ ]1+ [ ]doppler                                                  PT [ ]2+ [ ]1+ [ ]doppler      LABS:                        10.9   10.0  )-----------( 472      ( 26 Dec 2017 02:59 )             33.9     12-26    140  |  102  |  18  ----------------------------<  134<H>  3.8   |  27  |  1.18    Ca    8.4      26 Dec 2017 02:59  Phos  3.3     12-26  Mg     2.1     12-26        Assessment/Plan:  62M w/ HTN & PAD c/b RLE non-healing ulcers, & chronic infrarenal aortic occulsion now s/p aorto-bifem bypass w/ Hemashielf graft 12/19.     Pain control  Home meds  Clear liquid diet, might advance to reg  OM: Cefepime (12/20--) ID service ([facility]) following  ISS  DVT ppx wSQH  ASA 81mg po, Plavix 75mg  Wound: Bilateral groin incisions w/ Prevena vacs. Midline incision w/ island dressing. R knee wound vac (to be changed by Plastics).   BLE wounds w/ WTD Dakins dressing changes by RN daily.   OOBTC w/PT

## 2017-12-26 NOTE — PROGRESS NOTE ADULT - ASSESSMENT
61 y/o male s/p RLE nonhealing ulcers x 3 s/p debridement s/p Aortobifem bypass (12/19)  - continue vac therapy with veriflow  -keep RLE elevated

## 2017-12-26 NOTE — PROGRESS NOTE ADULT - ATTENDING COMMENTS
63 yo M poor historian with PMHx of HTN and PVD admitted for RLE necrotic ulcers s/p debridement of RLE arterial ulcers on 12/15/17 and s/p aortofemoral bypass on 12/19/17.    -Veraflow to medial knee wound, NPWT with promogran silver to calf and knee wounds.  -Appreciate ID input for knee osteomyelitis management.  -Plan for interval reconstruction as outpt once infection under control and stable from vascular perspective.  -WBAT, ok to be OOB and ambulate with assistance.  -Elevate RLE, ACE wrap if ok with vascular to control edema.

## 2017-12-26 NOTE — PROGRESS NOTE ADULT - SUBJECTIVE AND OBJECTIVE BOX
SUBJECTIVE:  Doing well.   No overnight events.     OBJECTIVE:     ** VITAL SIGNS / I&O's **    Vital Signs Last 24 Hrs  T(C): 36.6 (26 Dec 2017 08:26), Max: 37.7 (25 Dec 2017 22:23)  T(F): 97.8 (26 Dec 2017 08:26), Max: 99.8 (25 Dec 2017 22:23)  HR: 68 (26 Dec 2017 06:16) (68 - 130)  BP: 166/74 (26 Dec 2017 06:16) (134/68 - 175/90)  BP(mean): 79 (25 Dec 2017 10:00) (79 - 100)  RR: 18 (26 Dec 2017 06:16) (18 - 21)  SpO2: 100% (26 Dec 2017 00:45) (100% - 100%)      25 Dec 2017 07:01  -  26 Dec 2017 07:00  --------------------------------------------------------  IN:    dextrose 5%.: 100 mL    IV PiggyBack: 50 mL    Oral Fluid: 420 mL  Total IN: 570 mL    OUT:    Voided: 400 mL  Total OUT: 400 mL    Total NET: 170 mL          ** PHYSICAL EXAM **    -- CONSTITUTIONAL: Alert, Awake. NAD.   -- RESPIRATORY: unlabored breathing, no respiratory distress  -- RLE: 3 wounds with vac in place holding suction, no surrounding erythema, +edema      ** LABS **                          10.9   10.0  )-----------( 472      ( 26 Dec 2017 02:59 )             33.9     26 Dec 2017 02:59    140    |  102    |  18     ----------------------------<  134    3.8     |  27     |  1.18     Ca    8.4        26 Dec 2017 02:59  Phos  3.3       26 Dec 2017 02:59  Mg     2.1       26 Dec 2017 02:59        CAPILLARY BLOOD GLUCOSE      POCT Blood Glucose.: 146 mg/dL (26 Dec 2017 06:21)  POCT Blood Glucose.: 118 mg/dL (25 Dec 2017 21:22)  POCT Blood Glucose.: 128 mg/dL (25 Dec 2017 16:12)  POCT Blood Glucose.: 136 mg/dL (25 Dec 2017 11:56)

## 2017-12-27 PROBLEM — Z00.00 ENCOUNTER FOR PREVENTIVE HEALTH EXAMINATION: Status: ACTIVE | Noted: 2017-12-27

## 2017-12-27 PROBLEM — L03.90 CELLULITIS, UNSPECIFIED: Chronic | Status: ACTIVE | Noted: 2017-12-13

## 2017-12-27 LAB
GLUCOSE BLDC GLUCOMTR-MCNC: 125 MG/DL — HIGH (ref 70–99)
GLUCOSE BLDC GLUCOMTR-MCNC: 133 MG/DL — HIGH (ref 70–99)
GLUCOSE BLDC GLUCOMTR-MCNC: 147 MG/DL — HIGH (ref 70–99)
GLUCOSE BLDC GLUCOMTR-MCNC: 155 MG/DL — HIGH (ref 70–99)

## 2017-12-27 PROCEDURE — 36569 INSJ PICC 5 YR+ W/O IMAGING: CPT

## 2017-12-27 PROCEDURE — 76937 US GUIDE VASCULAR ACCESS: CPT | Mod: 26

## 2017-12-27 PROCEDURE — 99232 SBSQ HOSP IP/OBS MODERATE 35: CPT | Mod: 25

## 2017-12-27 RX ORDER — SODIUM CHLORIDE 9 MG/ML
20 INJECTION INTRAMUSCULAR; INTRAVENOUS; SUBCUTANEOUS ONCE
Qty: 0 | Refills: 0 | Status: DISCONTINUED | OUTPATIENT
Start: 2017-12-27 | End: 2017-12-29

## 2017-12-27 RX ORDER — AMLODIPINE BESYLATE 2.5 MG/1
5 TABLET ORAL EVERY 24 HOURS
Qty: 0 | Refills: 0 | Status: DISCONTINUED | OUTPATIENT
Start: 2017-12-27 | End: 2017-12-29

## 2017-12-27 RX ORDER — DIPHENHYDRAMINE HCL 50 MG
25 CAPSULE ORAL ONCE
Qty: 0 | Refills: 0 | Status: COMPLETED | OUTPATIENT
Start: 2017-12-27 | End: 2017-12-27

## 2017-12-27 RX ORDER — SODIUM CHLORIDE 9 MG/ML
10 INJECTION INTRAMUSCULAR; INTRAVENOUS; SUBCUTANEOUS
Qty: 0 | Refills: 0 | Status: DISCONTINUED | OUTPATIENT
Start: 2017-12-27 | End: 2017-12-29

## 2017-12-27 RX ORDER — SODIUM CHLORIDE 9 MG/ML
10 INJECTION INTRAMUSCULAR; INTRAVENOUS; SUBCUTANEOUS EVERY 12 HOURS
Qty: 0 | Refills: 0 | Status: DISCONTINUED | OUTPATIENT
Start: 2017-12-27 | End: 2017-12-29

## 2017-12-27 RX ORDER — ADENOSINE 3 MG/ML
6 INJECTION INTRAVENOUS ONCE
Qty: 0 | Refills: 0 | Status: COMPLETED | OUTPATIENT
Start: 2017-12-27 | End: 2017-12-27

## 2017-12-27 RX ORDER — AMIODARONE HYDROCHLORIDE 400 MG/1
400 TABLET ORAL
Qty: 0 | Refills: 0 | Status: DISCONTINUED | OUTPATIENT
Start: 2017-12-27 | End: 2017-12-29

## 2017-12-27 RX ADMIN — Medication 1 APPLICATION(S): at 05:33

## 2017-12-27 RX ADMIN — Medication 50 MILLIGRAM(S): at 05:32

## 2017-12-27 RX ADMIN — AMIODARONE HYDROCHLORIDE 400 MILLIGRAM(S): 400 TABLET ORAL at 17:38

## 2017-12-27 RX ADMIN — CEFEPIME 100 MILLIGRAM(S): 1 INJECTION, POWDER, FOR SOLUTION INTRAMUSCULAR; INTRAVENOUS at 03:25

## 2017-12-27 RX ADMIN — Medication 10 MILLIGRAM(S): at 13:15

## 2017-12-27 RX ADMIN — HYDROMORPHONE HYDROCHLORIDE 1 MILLIGRAM(S): 2 INJECTION INTRAMUSCULAR; INTRAVENOUS; SUBCUTANEOUS at 20:50

## 2017-12-27 RX ADMIN — Medication 10 MILLIGRAM(S): at 07:46

## 2017-12-27 RX ADMIN — PANTOPRAZOLE SODIUM 40 MILLIGRAM(S): 20 TABLET, DELAYED RELEASE ORAL at 05:33

## 2017-12-27 RX ADMIN — Medication 2: at 11:15

## 2017-12-27 RX ADMIN — Medication 50 MILLIGRAM(S): at 17:38

## 2017-12-27 RX ADMIN — HEPARIN SODIUM 7500 UNIT(S): 5000 INJECTION INTRAVENOUS; SUBCUTANEOUS at 05:33

## 2017-12-27 RX ADMIN — HEPARIN SODIUM 7500 UNIT(S): 5000 INJECTION INTRAVENOUS; SUBCUTANEOUS at 13:13

## 2017-12-27 RX ADMIN — CLOPIDOGREL BISULFATE 75 MILLIGRAM(S): 75 TABLET, FILM COATED ORAL at 11:15

## 2017-12-27 RX ADMIN — Medication 1 APPLICATION(S): at 17:38

## 2017-12-27 RX ADMIN — AMLODIPINE BESYLATE 5 MILLIGRAM(S): 2.5 TABLET ORAL at 11:18

## 2017-12-27 RX ADMIN — HEPARIN SODIUM 7500 UNIT(S): 5000 INJECTION INTRAVENOUS; SUBCUTANEOUS at 21:25

## 2017-12-27 RX ADMIN — CEFEPIME 100 MILLIGRAM(S): 1 INJECTION, POWDER, FOR SOLUTION INTRAMUSCULAR; INTRAVENOUS at 17:38

## 2017-12-27 RX ADMIN — Medication 3 TABLET(S): at 13:13

## 2017-12-27 RX ADMIN — Medication 25 MILLIGRAM(S): at 22:34

## 2017-12-27 RX ADMIN — Medication 81 MILLIGRAM(S): at 11:15

## 2017-12-27 RX ADMIN — CEFEPIME 100 MILLIGRAM(S): 1 INJECTION, POWDER, FOR SOLUTION INTRAMUSCULAR; INTRAVENOUS at 10:11

## 2017-12-27 RX ADMIN — Medication 10 MILLIGRAM(S): at 00:56

## 2017-12-27 RX ADMIN — ADENOSINE 6 MILLIGRAM(S): 3 INJECTION INTRAVENOUS at 14:44

## 2017-12-27 RX ADMIN — HYDROMORPHONE HYDROCHLORIDE 1 MILLIGRAM(S): 2 INJECTION INTRAMUSCULAR; INTRAVENOUS; SUBCUTANEOUS at 20:27

## 2017-12-27 NOTE — CHART NOTE - NSCHARTNOTEFT_GEN_A_CORE
Patient had an episode of SVT at about 3 pm today. Patient was given Adenosine 6 mg IVP and converted to sinus rhythm.  Please start Amiodarone 400 mg BID, while patient is in the hospital, and decrease dose to 200 mg daily when patient is discharged.

## 2017-12-27 NOTE — PROCEDURE NOTE - NSPROCDETAILS_GEN_ALL_CORE
sterile dressing applied/sterile technique, catheter placed/ultrasound assessment/supine position/ultrasound guidance/location identified, draped/prepped, sterile technique used

## 2017-12-27 NOTE — PROGRESS NOTE ADULT - ASSESSMENT
63 y/o male s/p RLE nonhealing ulcers x 3 s/p debridement s/p Aortobifem bypass (12/19)  - continue vac therapy with veriflow  - keep RLE elevated

## 2017-12-27 NOTE — PROGRESS NOTE ADULT - SUBJECTIVE AND OBJECTIVE BOX
Chief Complaint/Reason for Consult: HTN  INTERVAL HPI: SVT EP recs appreciated changed BB, bop not at goal, no palp no syncope  	  MEDICATIONS:  labetalol Injectable 10 milliGRAM(s) IV Push every 6 hours PRN  metoprolol succinate ER 50 milliGRAM(s) Oral two times a day    cefepime  IVPB      cefepime  IVPB 2000 milliGRAM(s) IV Intermittent every 8 hours      HYDROmorphone  Injectable 0.5 milliGRAM(s) IV Push every 4 hours PRN  HYDROmorphone  Injectable 1 milliGRAM(s) IV Push daily  HYDROmorphone  Injectable 1 milliGRAM(s) IV Push every 4 hours PRN    bisacodyl Suppository 10 milliGRAM(s) Rectal once PRN  pantoprazole    Tablet 40 milliGRAM(s) Oral before breakfast    insulin lispro (HumaLOG) corrective regimen sliding scale   SubCutaneous every 6 hours    aspirin  chewable 81 milliGRAM(s) Oral daily  BACItracin   Ointment 1 Application(s) Topical two times a day  clopidogrel Tablet 75 milliGRAM(s) Oral daily  Dakins Solution - 1/4 Strength 1 Application(s) Topical daily  Dakins Solution - Full Strength 1 Application(s) Topical once  heparin  Injectable 7500 Unit(s) SubCutaneous every 8 hours  potassium acid phosphate/sodium acid phosphate tablet (K-PHOS No. 2) 3 Tablet(s) Oral once      REVIEW OF SYSTEMS:  [x] As per HPI  CONSTITUTIONAL: No fever, weight loss, or fatigue  RESPIRATORY: No cough, wheezing, chills or hemoptysis; No Shortness of Breath  CARDIOVASCULAR: No chest pain, palpitations, dizziness, or leg swelling  GASTROINTESTINAL: No abdominal or epigastric pain. No nausea, vomiting, or hematemesis; No diarrhea or constipation. No melena or hematochezia.  MUSCULOSKELETAL: No joint pain or swelling; No muscle, back, or extremity pain  [x] All others negative	  [ ] Unable to obtain    PHYSICAL EXAM:  T(C): 36.4 (12-27-17 @ 09:14), Max: 37.1 (12-26-17 @ 22:00)  HR: 70 (12-27-17 @ 09:35) (68 - 137)  BP: 158/74 (12-27-17 @ 09:35) (142/81 - 183/82)  RR: 16 (12-27-17 @ 09:35) (16 - 18)  SpO2: 97% (12-27-17 @ 09:35) (96% - 97%)  Wt(kg): --  I&O's Summary    26 Dec 2017 07:01  -  27 Dec 2017 07:00  --------------------------------------------------------  IN: 450 mL / OUT: 1940 mL / NET: -1490 mL    27 Dec 2017 07:01  -  27 Dec 2017 09:50  --------------------------------------------------------  IN: 120 mL / OUT: 500 mL / NET: -380 mL          Appearance: Normal	  HEENT:   Normal oral mucosa  Cardiovascular: Normal S1 S2, No JVD, No murmurs, No edema  Respiratory: Lungs clear to auscultation	  Gastrointestinal:  Soft, Non-tender, + BS	  Extremities: Normal range of motion, No clubbing, cyanosis or edema  Vascular: Peripheral pulses palpable 2+ bilaterally    TELEMETRY: 	    ECG:   	  RADIOLOGY:   CXR:  CT:  US:    CARDIAC TESTING:  Echocardiogram:  Catheterization:  Stress Test:      LABS:	 	    CARDIAC MARKERS:                                  10.9   10.0  )-----------( 472      ( 26 Dec 2017 02:59 )             33.9     12-26    140  |  102  |  18  ----------------------------<  134<H>  3.8   |  27  |  1.18    Ca    8.4      26 Dec 2017 02:59  Phos  3.3     12-26  Mg     2.1     12-26      proBNP:   Lipid Profile:   HgA1c:   TSH:     ASSESSMENT/PLAN: 	    # SVT - EP input aprpeciated BB chagned to long acting.    #HTN - not at goal, add noravsc 5mg po qdaily    #CV Prevention -   q3mo Fasting Lipid Profile, Goal LDL<100, statin as tolerated.  q6week TSH check  q3mo 25-OHD Vitamin D Level, Goal 50, supplement as tolerated

## 2017-12-27 NOTE — PROGRESS NOTE ADULT - ATTENDING COMMENTS
63 yo M poor historian with PMHx of HTN and PVD admitted for RLE necrotic ulcers s/p debridement of RLE arterial ulcers on 12/15/17 and s/p aortofemoral bypass on 12/19/17. Has has been doing well, discharge planning to rehab.  -Will change NPWT prior to discharge. Consider silvadene to medial knee wound since veraflow canot be done as outpt.  -Plan for interval reconstruction as outpt once infection under control and stable from vascular perspective.  -WBAT, ok to be OOB and ambulate with assistance.  -Dispo planning to rehab

## 2017-12-27 NOTE — PROGRESS NOTE ADULT - SUBJECTIVE AND OBJECTIVE BOX
SUBJECTIVE:  Doing well.   No overnight events.   VAC changed yesterday.    OBJECTIVE:     ** VITAL SIGNS / I&O's **    Vital Signs Last 24 Hrs  T(C): 36.7 (27 Dec 2017 05:34), Max: 37.1 (26 Dec 2017 22:00)  T(F): 98.1 (27 Dec 2017 05:34), Max: 98.8 (26 Dec 2017 22:00)  HR: 74 (27 Dec 2017 07:35) (68 - 137)  BP: 182/82 (27 Dec 2017 07:35) (142/81 - 183/82)  BP(mean): --  RR: 18 (27 Dec 2017 07:35) (16 - 18)  SpO2: 96% (27 Dec 2017 07:35) (96% - 97%)      26 Dec 2017 07:01  -  27 Dec 2017 07:00  --------------------------------------------------------  IN:    IV PiggyBack: 50 mL    sodium chloride 0.9%: 400 mL  Total IN: 450 mL    OUT:    VAC (Vacuum Assisted Closure) System: 15 mL    Voided: 1925 mL  Total OUT: 1940 mL    Total NET: -1490 mL          ** PHYSICAL EXAM **    -- CONSTITUTIONAL: Alert, Awake. NAD.   -- RESPIRATORY: unlabored breathing, no respiratory distress  -- RLE: 3 wounds with vac in place holding suction, no surrounding erythema, +edema      ** LABS **                          10.9   10.0  )-----------( 472      ( 26 Dec 2017 02:59 )             33.9     26 Dec 2017 02:59    140    |  102    |  18     ----------------------------<  134    3.8     |  27     |  1.18     Ca    8.4        26 Dec 2017 02:59  Phos  3.3       26 Dec 2017 02:59  Mg     2.1       26 Dec 2017 02:59        CAPILLARY BLOOD GLUCOSE      POCT Blood Glucose.: 133 mg/dL (27 Dec 2017 06:19)  POCT Blood Glucose.: 169 mg/dL (26 Dec 2017 21:33)  POCT Blood Glucose.: 119 mg/dL (26 Dec 2017 16:33)  POCT Blood Glucose.: 209 mg/dL (26 Dec 2017 10:42)

## 2017-12-27 NOTE — CONSULT NOTE ADULT - SUBJECTIVE AND OBJECTIVE BOX
Vascular Access Service Consult Note    [facility]:             Diagnosis:    Indications for Vascular Access (Check all that apply)  [ x ]  Antibiotic Therapy       Antibiotic Prescribed:   cefepime x4 weeks           [  ]  IV Hydration  [  ]  Total Parenteral Nutrition  [  ]  Chemotherapy  [  ]  Difficult Venous Access  [  ]  CVP monitoring  [  ]  Medications with high potential for tissue necrosis on extravasation  [  ]  Other    Screening (Check all that apply)  Previous Radiation to chest  [  ] Yes      [ x ]  No  Breast Cancer                          [  ] Left     [  ]  Right    [ x ]  No  Lymph Node Dissection         [  ] Left     [  ]  Right    [ x ]  No  Pacemaker or ICD                   [  ] Left     [  ]  Right    [ x ]  No  Upper Extremity DVT             [  ] Left     [  ]  Right    [  x]  No  Chronic Kidney Disease         [  ]  Yes     [ x ]  No  Hemodialysis                           [  ]  Yes     [ x ]  No  AV Fistula/ Graft                     [  ]  Left    [  ]  Right    [ x ]  No  Temp>101F in past 24 H       [  ]  Yes     [x  ]  No  H/O PICC/Midline                   [  ]  Yes     [ x ]  No    Lab data:                        10.9   10.0  )-----------( 472      ( 26 Dec 2017 02:59 )             33.9     12-26    140  |  102  |  18  ----------------------------<  134<H>  3.8   |  27  |  1.18    Ca    8.4      26 Dec 2017 02:59  Phos  3.3     12-26  Mg     2.1     12-26                I have reviewed the chart, interviewed and examined the patient and determined that this patient:  [  ] Is a candidate for a PICC line  [ x ] Is a candidate for a Midline  [  ] Is not a candidate for vascular access device (reason)    Lumens:    [ x ] Single  [  ] Double

## 2017-12-27 NOTE — PROGRESS NOTE ADULT - SUBJECTIVE AND OBJECTIVE BOX
24hr Events:  O/N: Pt refused to be taken for PICC line, but ok to get PICC line today after explaining to him why he needs it, Fluid held at midnight, VSS  12/26: advanced to reg diet, supp 1xBM, cards rec increasing lopressor 50mg to TID and hydration NS@100cc/hr, workedwith PT, VAC changed by plastics, EP rec Toprol Xl 50mg BID instead of lopressor - switched, pendingPICC line      Assessment/Plan;  62M w/ HTN & PAD c/b RLE non-healing ulcers, & chronic infrarenal aortic occulsion now s/p aorto-bifem bypass w/ Hemashielf graft 12/19.     Pain control  Home meds  Regular diet  OM: Cefepime (12/20--) ID service ([facility]) following  ISS  DVT ppx wSQH  ASA 81mg po, Plavix 75mg  Wound: Bilateral groin incisions w/ Prevena vacs. Midline incision w/ island dressing. R knee wound vac (to be changed by Plastics).   BLE wounds w/ WTD Dakins dressing changes by RN daily.   OOBTC w/PT 24hr Events:  O/N: Pt refused to be taken for PICC line, but ok to get PICC line today after explaining to him why he needs it, Fluid held at midnight, VSS  12/26: advanced to reg diet, supp 1xBM, cards rec increasing lopressor 50mg to TID and hydration NS@100cc/hr, workedwith PT, VAC changed by plastics, EP rec Toprol Xl 50mg BID instead of lopressor - switched, pendingPICC line    cefepime  IVPB   cefepime  IVPB 2000  aspirin  chewable 81  cefepime  IVPB   cefepime  IVPB 2000  clopidogrel Tablet 75  heparin  Injectable 7500  labetalol Injectable 10 PRN  metoprolol succinate ER 50      Allergies    No Known Allergies    Intolerances        Vital Signs Last 24 Hrs  T(C): 36.7 (27 Dec 2017 05:34), Max: 37.1 (26 Dec 2017 22:00)  T(F): 98.1 (27 Dec 2017 05:34), Max: 98.8 (26 Dec 2017 22:00)  HR: 74 (27 Dec 2017 07:35) (68 - 137)  BP: 182/82 (27 Dec 2017 07:35) (142/81 - 183/82)  BP(mean): --  RR: 18 (27 Dec 2017 07:35) (16 - 18)  SpO2: 96% (27 Dec 2017 07:35) (96% - 97%)  I&O's Summary    26 Dec 2017 07:01  -  27 Dec 2017 07:00  --------------------------------------------------------  IN: 450 mL / OUT: 1940 mL / NET: -1490 mL        Physical Exam:  General: AAOx3, NAD  Pulmonary:  Cardiovascular:  Abdominal: incision C/D/I, soft, midly distended, bilat provena VACs intact, non tender  Extremities: RLE VAC dressing  intact x 3, no surrounding erythema/edema  Pulses:   Right:                                                                          Left:  FEM [ ]2+ [ ]1+ [ ]doppler                                             FEM [ ]2+ [ ]1+ [ ]doppler    POP [ ]2+ [ ]1+ [ ]doppler                                             POP [ ]2+ [ ]1+ [ ]doppler    DP [ ]2+ [ ]1+ [ ]doppler                                                DP [ ]2+ [ ]1+ [ ]doppler  PT[ ]2+ [ ]1+ [ ]doppler                                                  PT [ ]2+ [ ]1+ [ ]doppler      LABS:                        10.9   10.0  )-----------( 472      ( 26 Dec 2017 02:59 )             33.9     12-26    140  |  102  |  18  ----------------------------<  134<H>  3.8   |  27  |  1.18    Ca    8.4      26 Dec 2017 02:59  Phos  3.3     12-26  Mg     2.1     12-26        Assessment/Plan;  62M w/ HTN & PAD c/b RLE non-healing ulcers, & chronic infrarenal aortic occulsion now s/p aorto-bifem bypass w/ Hemashielf graft 12/19.     Pain control  Home meds  Regular diet  OM: Cefepime (12/20--) ID service ([facility]) following  ISS  DVT ppx wSQH  ASA 81mg po, Plavix 75mg  Wound: Bilateral groin incisions w/ Prevena vacs. Midline incision w/ island dressing. R knee wound vac (to be changed by Plastics).   BLE wounds w/ WTD Dakins dressing changes by RN daily.   OOBTC w/PT  PICC line today

## 2017-12-27 NOTE — PROGRESS NOTE ADULT - SUBJECTIVE AND OBJECTIVE BOX
EPS Progress Note    S: in bed, NAD, no c/o of palpitations today.     MEDICATIONS  (STANDING):  aspirin  chewable 81 milliGRAM(s) Oral daily  BACItracin   Ointment 1 Application(s) Topical two times a day  cefepime  IVPB      cefepime  IVPB 2000 milliGRAM(s) IV Intermittent every 8 hours  clopidogrel Tablet 75 milliGRAM(s) Oral daily  Dakins Solution - 1/4 Strength 1 Application(s) Topical daily  Dakins Solution - Full Strength 1 Application(s) Topical once  heparin  Injectable 7500 Unit(s) SubCutaneous every 8 hours  HYDROmorphone  Injectable 1 milliGRAM(s) IV Push daily  insulin lispro (HumaLOG) corrective regimen sliding scale   SubCutaneous every 6 hours  metoprolol succinate ER 50 milliGRAM(s) Oral two times a day  pantoprazole    Tablet 40 milliGRAM(s) Oral before breakfast  potassium acid phosphate/sodium acid phosphate tablet (K-PHOS No. 2) 3 Tablet(s) Oral once  sodium chloride 0.9% lock flush 20 milliLiter(s) IV Push once      Telemetry: SR               Chest:  CTA B/L         Cardiac:  RRR      s1/s2       Abdomen:  +BS      Soft      Non-Tender      Non-Distended   Extremities: + edema , + drains to R LE   Labs:                                                               10.9   10.0  )-----------( 472      ( 26 Dec 2017 02:59 )             33.9     12-26    140  |  102  |  18  ----------------------------<  134<H>  3.8   |  27  |  1.18    Ca    8.4      26 Dec 2017 02:59  Phos  3.3     12-26  Mg     2.1     12-26        Assessment/Plan:  63 y/o M w/ HTN & PAD c/b RLE non-healing ulcers s/p debridement & chronic infrarenal aortic occulsion now s/p aorto-bifem bypass w/ Hemashielf graft 12/19.  Now with paroxysmal SVT - atrial tachycardia versus atrial flutter on telemetry. Patient maintaining sinus rhythm.   Continue Toprol XL 50 mg BID for  rate control, no EP interventions, patient to follow up with Dr. Mcclure 2/5/18 @ 11:50 am

## 2017-12-27 NOTE — PROGRESS NOTE ADULT - NSHPATTENDINGPLANDISCUSS_GEN_ALL_CORE
Chief Resident, Karina Montoya M.D.
Ciro Sharma M.D. (resident)
Fellow, Alexa Albright M.D.
Fellow, Nolan Gar M.D.
Fellow, Nolan Gar M.D.
Nolan Gar MD (fellow)
chief resident, Karina Hopkins MD
Ciro Sharma MD (senior resident)
Fellow, Giovanni Montesinos M.D.
as above
as above and vascular
aas above

## 2017-12-28 ENCOUNTER — TRANSCRIPTION ENCOUNTER (OUTPATIENT)
Age: 62
End: 2017-12-28

## 2017-12-28 LAB
APTT BLD: 34.6 SEC — SIGNIFICANT CHANGE UP (ref 27.5–37.4)
GLUCOSE BLDC GLUCOMTR-MCNC: 127 MG/DL — HIGH (ref 70–99)
GLUCOSE BLDC GLUCOMTR-MCNC: 134 MG/DL — HIGH (ref 70–99)
GLUCOSE BLDC GLUCOMTR-MCNC: 150 MG/DL — HIGH (ref 70–99)
GLUCOSE BLDC GLUCOMTR-MCNC: 186 MG/DL — HIGH (ref 70–99)
INR BLD: 1.15 — SIGNIFICANT CHANGE UP (ref 0.88–1.16)
PROTHROM AB SERPL-ACNC: 12.8 SEC — HIGH (ref 9.8–12.7)

## 2017-12-28 PROCEDURE — 93971 EXTREMITY STUDY: CPT | Mod: 26,RT

## 2017-12-28 RX ORDER — DIPHENHYDRAMINE HCL 50 MG
25 CAPSULE ORAL ONCE
Qty: 0 | Refills: 0 | Status: COMPLETED | OUTPATIENT
Start: 2017-12-28 | End: 2017-12-28

## 2017-12-28 RX ORDER — PANTOPRAZOLE SODIUM 20 MG/1
1 TABLET, DELAYED RELEASE ORAL
Qty: 0 | Refills: 0 | COMMUNITY
Start: 2017-12-28

## 2017-12-28 RX ORDER — CEFEPIME 1 G/1
2 INJECTION, POWDER, FOR SOLUTION INTRAMUSCULAR; INTRAVENOUS
Qty: 0 | Refills: 0 | COMMUNITY
Start: 2017-12-28 | End: 2018-12-25

## 2017-12-28 RX ORDER — METOPROLOL TARTRATE 50 MG
1 TABLET ORAL
Qty: 0 | Refills: 0 | COMMUNITY
Start: 2017-12-28

## 2017-12-28 RX ADMIN — CEFEPIME 100 MILLIGRAM(S): 1 INJECTION, POWDER, FOR SOLUTION INTRAMUSCULAR; INTRAVENOUS at 18:02

## 2017-12-28 RX ADMIN — PANTOPRAZOLE SODIUM 40 MILLIGRAM(S): 20 TABLET, DELAYED RELEASE ORAL at 05:55

## 2017-12-28 RX ADMIN — AMLODIPINE BESYLATE 5 MILLIGRAM(S): 2.5 TABLET ORAL at 13:13

## 2017-12-28 RX ADMIN — Medication 1 APPLICATION(S): at 18:02

## 2017-12-28 RX ADMIN — Medication 50 MILLIGRAM(S): at 17:35

## 2017-12-28 RX ADMIN — Medication 1 APPLICATION(S): at 10:26

## 2017-12-28 RX ADMIN — AMIODARONE HYDROCHLORIDE 400 MILLIGRAM(S): 400 TABLET ORAL at 05:54

## 2017-12-28 RX ADMIN — Medication 1 APPLICATION(S): at 05:55

## 2017-12-28 RX ADMIN — HEPARIN SODIUM 7500 UNIT(S): 5000 INJECTION INTRAVENOUS; SUBCUTANEOUS at 05:55

## 2017-12-28 RX ADMIN — Medication 50 MILLIGRAM(S): at 05:54

## 2017-12-28 RX ADMIN — HYDROMORPHONE HYDROCHLORIDE 1 MILLIGRAM(S): 2 INJECTION INTRAMUSCULAR; INTRAVENOUS; SUBCUTANEOUS at 15:03

## 2017-12-28 RX ADMIN — CEFEPIME 100 MILLIGRAM(S): 1 INJECTION, POWDER, FOR SOLUTION INTRAMUSCULAR; INTRAVENOUS at 01:13

## 2017-12-28 RX ADMIN — CLOPIDOGREL BISULFATE 75 MILLIGRAM(S): 75 TABLET, FILM COATED ORAL at 13:13

## 2017-12-28 RX ADMIN — CEFEPIME 100 MILLIGRAM(S): 1 INJECTION, POWDER, FOR SOLUTION INTRAMUSCULAR; INTRAVENOUS at 10:25

## 2017-12-28 RX ADMIN — HYDROMORPHONE HYDROCHLORIDE 1 MILLIGRAM(S): 2 INJECTION INTRAMUSCULAR; INTRAVENOUS; SUBCUTANEOUS at 15:17

## 2017-12-28 RX ADMIN — Medication 81 MILLIGRAM(S): at 13:13

## 2017-12-28 RX ADMIN — Medication 2: at 22:01

## 2017-12-28 RX ADMIN — HEPARIN SODIUM 7500 UNIT(S): 5000 INJECTION INTRAVENOUS; SUBCUTANEOUS at 13:13

## 2017-12-28 RX ADMIN — Medication 25 MILLIGRAM(S): at 22:56

## 2017-12-28 RX ADMIN — AMIODARONE HYDROCHLORIDE 400 MILLIGRAM(S): 400 TABLET ORAL at 17:35

## 2017-12-28 NOTE — DISCHARGE NOTE ADULT - ADDITIONAL INSTRUCTIONS
Follow up with Dr. Gonzalez in 1-2 weeks in office at [phone].   Follow up with Dr. Kam  Follow up with Dr. Bennett and/or Kami Follow up with Dr. Gonzalez in 1-2 weeks in office at [phone]  Follow up with Dr. Mcclure on January 5, 2018 @11:50am (office information below)  Follow up with Dr. Kam (office information below)  Follow up with Dr. Bennett

## 2017-12-28 NOTE — DISCHARGE NOTE ADULT - MEDICATION SUMMARY - MEDICATIONS TO TAKE
I will START or STAY ON the medications listed below when I get home from the hospital:    aspirin  -- Indication: For Circulation    Pacerone 200 mg oral tablet  -- 1 tab(s) by mouth 2 times a day   -- Indication: For Arrhythmia    Plavix  -- Indication: For Circulation    metoprolol succinate 50 mg oral tablet, extended release  -- 1 tab(s) by mouth 2 times a day  -- Indication: For HTN (hypertension)    amLODIPine  -- Indication: For HTN (hypertension)    cefepime  -- 2 gram(s) intravenously every 8 hours  -- Indication: For Infection    pantoprazole 40 mg oral delayed release tablet  -- 1 tab(s) by mouth once a day (before a meal)  -- Indication: For GERD

## 2017-12-28 NOTE — DISCHARGE NOTE ADULT - HOSPITAL COURSE
62M poor history w/ HTN who presented to [facility] ED 12/13/17 w/ non-healing RLE ulcers & cellulitis. Pt is followed by wound care clinic in [place]. Referred to [facility] for eval but missed appt thus presented to ED. R medial knee ulcer developed 1yr ago after a fall. R posterior ankle ulcer developed 1mo ago w/o trauma. Was on ASA, Plavix, & Norvasc until 1wk prior to admission; now only taking Bumex & K+ meds. Pt was admitted to Vascular Surgery, started on vancomycin/Zosyn, & worked up for PAD. CTA 12/13 showed occluded abd aorta below renal arteries; occluded R RAKESH, IIA, EIA, & CFA w/ distal reconstitution; & occluded L RAKESH, L IIA, & L EIA w/ distal reconstitution. Bilateral lower extremities has 3-vessel runoff. Lexiscan 12/14- normal LVSF w/ no regional WMA. EKG stress test 12/14- normal. Echo 12/17- LVEF 70% w/ no WMA. On 12/15, Dr. Olsen (Plastics) debrided RLE wound to remove infectious source prior to PTFE aorto-bifem bypass. On 12/15/17 patient was taken to OR by plastic surgery for RLE ulcer debridement of patella ulcer (bone biopsy sent), medial upper leg ulcer and posterior achilles ulcer (no tenden exposed). Post operative course unremarkable, pain well controlled and patient is continued on wide spectrum ABX.  On 12/19/17 patient was taken to the OR by Vascular for aorto-bifemoral bypass w/ [id number] Hemashield graft. Suprarenal clamp time 8min. Palpable pulse distal to anastomosis at end of case. Post operative course unremarkable, patient remained in SICU until bowel function returned, then hig NGT was removed, he was stepped down and his diet advanced which he tolerated well. Plastic surgery continued to follow patient is change his wound VAC 3xweek. It was also found on cultures and bone biopsy that patient has osteomyelitis for which he had a PICC placed and will resume appropriate ABX (Cefepime) for 6 weeks total, until 1/25/17.  Abdominal incision is healing well and patient is having good bowel function with +flatus/BM. He was evaluated by physical therapy and it was recommended that he goes to Sub Acute Rehab for further PT and improvement of balance/strength and stable ambulation. Today patient is feeling well, all the VS and blood work is within normal limits, the pain is well controlled on oral pain medication, tolerating diet without nausea, and ambulating with some assistance - patient is stable and ready for discharge today.

## 2017-12-28 NOTE — DISCHARGE NOTE ADULT - CARE PROVIDER_API CALL
Eric Gonzalez), Surgery  [address] [address]  Phone: [phone]  Fax: [phone]    Robyn Kam (MD), Infectious Disease; Internal Medicine  [address]  [address] [place] [address]  Phone: [phone]  Fax: [phone]    Gallo Bennett), Internal Medicine  [address] [id number]  Phone: [phone]  Fax: [phone]    Tomas Mcclure), Cardiac Electrophysiology; Cardiovascular Disease; Internal Medicine  [address] [address]  Phone: [phone]  Fax: [phone]

## 2017-12-28 NOTE — DISCHARGE NOTE ADULT - CARE PLAN
Principal Discharge DX:	Nonhealing ulcer of right lower leg with fat layer exposed  Goal:	Wound healing, recovery from open abdominal surgery  Instructions for follow-up, activity and diet:	Follow up with Dr. Gonzalez in 1-2 weeks. Call the office at  to schedule your appointment. Also follow up with Dr. Olsen from plastic surgery.  You may shower; soap and water over incision sites.  Right lower leg: VAC x2 on anterior patella and posterior ankle (may use 2 VACs or use Y connector)  V.A.C. wound care. Change 3 times per week at 125 mmHg.  IF VAC malfunctions, then dampen gauze with saline solution. Pack into wound. Cover with dry gauze and Kerlix wrap daily.   Ambulate as tolerated, but no heavy lifting (>10lbs) or strenuous exercise. NO weight bearing restrictions. You may resume regular diet. You should be urinating at least 3-4x per day.   Call the office if you experience increasing pain, abdominal pain, nausea, vomiting, right leg wound redness/duscharge, leg numbness/tingling/weakness, fever or temperature >101.4F.    Please resume Cefepime 2g q8h via PICC line until 1/25/17 Principal Discharge DX:	Nonhealing ulcer of right lower leg with fat layer exposed  Goal:	Wound healing, recovery from open abdominal surgery  Instructions for follow-up, activity and diet:	Follow up with Dr. Gonzalez in 1-2 weeks. Call the office at  to schedule your appointment. Also follow up with Dr. Olsen from plastic surgery.    Wound care: You may shower; soap and water over incision sites.  Right lower leg: VAC x2 on anterior patella and posterior ankle (may use 2 VACs or use Y connector)  V.A.C. wound care. Change 3 times per week at 125 mmHg.  IF VAC malfunctions, then dampen gauze with saline solution. Pack into wound. Cover with dry gauze and Kerlix wrap daily.   Inferior anterior patella wound: please apply silvadene daily and cover with dry kerlix.    Ambulate as tolerated, but no heavy lifting (>10lbs) or strenuous exercise. NO weight bearing restrictions. You may resume regular diet. You should be urinating at least 3-4x per day.   Call the office if you experience increasing pain, abdominal pain, nausea, vomiting, right leg wound redness/duscharge, leg numbness/tingling/weakness, fever or temperature >101.4F.    Please resume Cefepime 2g q8h via PICC line until 1/25/17 for the osteomyelitis found in your patella wound. Follow up with Dr. Kam, please call he office to schedule your appointment (see contact information below)    Cardiology/EP: you were started on 2 new medication for elevated BP and fast HR: Toprol XL 50mg twice a day, Norvasc 10mg once a day and Amiodarone 200mg twice a day. Please continue these and follow up with your primary care doctor as well as the cardiologist and EP doctor, (see contact information below). Principal Discharge DX:	Nonhealing ulcer of right lower leg with fat layer exposed  Goal:	Wound healing, recovery from open abdominal surgery  Instructions for follow-up, activity and diet:	Follow up with Dr. Gonzalez in 1-2 weeks. Call the office at  to schedule your appointment. Also follow up with Dr. Olsen from plastic surgery.    Wound care: You may shower; soap and water over incision sites.  Right lower leg: VAC x2 on anterior patella and posterior ankle (may use 2 VACs or use Y connector)  V.A.C. wound care. Change 3 times per week at 125 mmHg.  IF VAC malfunctions, then dampen gauze with saline solution. Pack into wound. Cover with dry gauze and Kerlix wrap daily.   Inferior anterior patella wound: please apply collagenase daily and cover with dry kerlix.  May apply Silvadene to superficial skin break over lateral legs    Ambulate as tolerated, but no heavy lifting (>10lbs) or strenuous exercise. NO weight bearing restrictions. You may resume regular diet. You should be urinating at least 3-4x per day.   Call the office if you experience increasing pain, abdominal pain, nausea, vomiting, right leg wound redness/discharge, leg numbness/tingling/weakness, fever or temperature >101.4F.    Please resume Cefepime 2g q8h via PICC line until 1/25/17 for the osteomyelitis found in your patella wound. Follow up with Dr. Kam, please call he office to schedule your appointment (see contact information below)    Please follow up with Dr. Mcclure on January 5, 2018 @11:50am  Cardiology/EP: you were started on 2 new medication for elevated BP and fast HR: Toprol XL 50mg twice a day, Norvasc 10mg once a day and Amiodarone 200mg twice a day. Please continue these and follow up with your primary care doctor as well as the cardiologist and EP doctor, (see contact information below).

## 2017-12-28 NOTE — DISCHARGE NOTE ADULT - PLAN OF CARE
Wound healing, recovery from open abdominal surgery Follow up with Dr. Gonzalez in 1-2 weeks. Call the office at  to schedule your appointment. Also follow up with Dr. Olsen from plastic surgery.  You may shower; soap and water over incision sites.  Right lower leg: VAC x2 on anterior patella and posterior ankle (may use 2 VACs or use Y connector)  V.A.C. wound care. Change 3 times per week at 125 mmHg.  IF VAC malfunctions, then dampen gauze with saline solution. Pack into wound. Cover with dry gauze and Kerlix wrap daily.   Ambulate as tolerated, but no heavy lifting (>10lbs) or strenuous exercise. NO weight bearing restrictions. You may resume regular diet. You should be urinating at least 3-4x per day.   Call the office if you experience increasing pain, abdominal pain, nausea, vomiting, right leg wound redness/duscharge, leg numbness/tingling/weakness, fever or temperature >101.4F.    Please resume Cefepime 2g q8h via PICC line until 1/25/17 Follow up with Dr. Gonzalez in 1-2 weeks. Call the office at  to schedule your appointment. Also follow up with Dr. Olsen from plastic surgery.    Wound care: You may shower; soap and water over incision sites.  Right lower leg: VAC x2 on anterior patella and posterior ankle (may use 2 VACs or use Y connector)  V.A.C. wound care. Change 3 times per week at 125 mmHg.  IF VAC malfunctions, then dampen gauze with saline solution. Pack into wound. Cover with dry gauze and Kerlix wrap daily.   Inferior anterior patella wound: please apply silvadene daily and cover with dry kerlix.    Ambulate as tolerated, but no heavy lifting (>10lbs) or strenuous exercise. NO weight bearing restrictions. You may resume regular diet. You should be urinating at least 3-4x per day.   Call the office if you experience increasing pain, abdominal pain, nausea, vomiting, right leg wound redness/duscharge, leg numbness/tingling/weakness, fever or temperature >101.4F.    Please resume Cefepime 2g q8h via PICC line until 1/25/17 for the osteomyelitis found in your patella wound. Follow up with Dr. Kam, please call he office to schedule your appointment (see contact information below)    Cardiology/EP: you were started on 2 new medication for elevated BP and fast HR: Toprol XL 50mg twice a day, Norvasc 10mg once a day and Amiodarone 200mg twice a day. Please continue these and follow up with your primary care doctor as well as the cardiologist and EP doctor, (see contact information below). Follow up with Dr. Gonzalez in 1-2 weeks. Call the office at  to schedule your appointment. Also follow up with Dr. Olsen from plastic surgery.    Wound care: You may shower; soap and water over incision sites.  Right lower leg: VAC x2 on anterior patella and posterior ankle (may use 2 VACs or use Y connector)  V.A.C. wound care. Change 3 times per week at 125 mmHg.  IF VAC malfunctions, then dampen gauze with saline solution. Pack into wound. Cover with dry gauze and Kerlix wrap daily.   Inferior anterior patella wound: please apply collagenase daily and cover with dry kerlix.  May apply Silvadene to superficial skin break over lateral legs    Ambulate as tolerated, but no heavy lifting (>10lbs) or strenuous exercise. NO weight bearing restrictions. You may resume regular diet. You should be urinating at least 3-4x per day.   Call the office if you experience increasing pain, abdominal pain, nausea, vomiting, right leg wound redness/discharge, leg numbness/tingling/weakness, fever or temperature >101.4F.    Please resume Cefepime 2g q8h via PICC line until 1/25/17 for the osteomyelitis found in your patella wound. Follow up with Dr. Kam, please call he office to schedule your appointment (see contact information below)    Please follow up with Dr. Mcclure on January 5, 2018 @11:50am  Cardiology/EP: you were started on 2 new medication for elevated BP and fast HR: Toprol XL 50mg twice a day, Norvasc 10mg once a day and Amiodarone 200mg twice a day. Please continue these and follow up with your primary care doctor as well as the cardiologist and EP doctor, (see contact information below).

## 2017-12-28 NOTE — PROGRESS NOTE ADULT - SUBJECTIVE AND OBJECTIVE BOX
o/n: HERMES  12/27 Not accepeted to [facility]. Added Norvasc per Phylicia. PICC  line placed. EP pushed adinosine converted. Starting Amioderone 400 BID (200BID after discharge). plan for ablation once wounds ore healed.       62M w/ HTN & PAD c/b RLE non-healing ulcers, & chronic infrarenal aortic occulsion now s/p aorto-bifem bypass w/ Hemashielf graft 12/19.     Pain control  Home meds  Regular diet  OM: Cefepime (12/20--) ID service (Negin) following  ISS  DVT ppx wSQH  ASA 81mg po, Plavix 75mg  Wound: Bilateral groin incisions w/ Prevena vacs. Midline incision w/ island dressing. R knee wound vac (to be changed by Plastics).   BLE wounds w/ WTD Dakins dressing changes by RN daily.   OOBTC w/PT  awaiting ANTHONY o/n: HERMES  12/27 Not accepeted to [facility]. Added Norvasc per [facility]. PICC  line placed. EP pushed adinosine converted. Starting Amioderone 400 BID (200BID after discharge). plan for ablation once wounds ore healed.       cefepime  IVPB   cefepime  IVPB 2000  amiodarone    Tablet 400  amLODIPine   Tablet 5  aspirin  chewable 81  cefepime  IVPB   cefepime  IVPB 2000  clopidogrel Tablet 75  heparin  Injectable 7500  labetalol Injectable 10 PRN  metoprolol succinate ER 50      Allergies    No Known Allergies    Intolerances        Vital Signs Last 24 Hrs  T(C): 36.9 (28 Dec 2017 05:56), Max: 37.4 (27 Dec 2017 21:30)  T(F): 98.4 (28 Dec 2017 05:56), Max: 99.3 (27 Dec 2017 21:30)  HR: 67 (28 Dec 2017 05:48) (66 - 77)  BP: 159/74 (28 Dec 2017 05:48) (151/70 - 172/78)  BP(mean): --  RR: 16 (28 Dec 2017 05:48) (16 - 18)  SpO2: 96% (28 Dec 2017 05:48) (94% - 97%)  I&O's Summary    27 Dec 2017 07:01  -  28 Dec 2017 07:00  --------------------------------------------------------  IN: 350 mL / OUT: 2325 mL / NET: -1975 mL        General: AAOx3, NAD  Pulmonary:  Cardiovascular:  Abdominal: incision C/D/I, soft, midly distended, bilat provena VACs intact, non tender  Extremities: RLE VAC dressing  intact x 3, no surrounding erythema/edema        62M w/ HTN & PAD c/b RLE non-healing ulcers, & chronic infrarenal aortic occulsion now s/p aorto-bifem bypass w/ Hemashielf graft 12/19.     Pain control  Home meds  Regular diet  OM: Cefepime (12/20--) ID service (Negin) following  ISS  DVT ppx wSQH  ASA 81mg po, Plavix 75mg  Wound: Bilateral groin incisions w/ Prevena vacs. Midline incision w/ island dressing. R knee wound vac (to be changed by Plastics).   BLE wounds w/ WTD Dakins dressing changes by RN daily.   OOBTC w/PT  PICC   Repeat duplex today  awaiting ANTHONY

## 2017-12-28 NOTE — PROGRESS NOTE ADULT - SUBJECTIVE AND OBJECTIVE BOX
SUBJECTIVE:  Doing well.   No overnight events.     OBJECTIVE:     ** VITAL SIGNS / I&O's **    Vital Signs Last 24 Hrs  T(C): 36.2 (28 Dec 2017 08:40), Max: 37.4 (27 Dec 2017 21:30)  T(F): 97.1 (28 Dec 2017 08:40), Max: 99.3 (27 Dec 2017 21:30)  HR: 71 (28 Dec 2017 14:50) (66 - 84)  BP: 154/70 (28 Dec 2017 14:50) (151/70 - 168/72)  BP(mean): --  RR: 16 (28 Dec 2017 14:50) (16 - 18)  SpO2: 97% (28 Dec 2017 09:40) (94% - 97%)      27 Dec 2017 07:01  -  28 Dec 2017 07:00  --------------------------------------------------------  IN:    IV PiggyBack: 50 mL    Oral Fluid: 300 mL  Total IN: 350 mL    OUT:    Voided: 2325 mL  Total OUT: 2325 mL    Total NET: -1975 mL      28 Dec 2017 07:01  -  28 Dec 2017 16:14  --------------------------------------------------------  IN:    Oral Fluid: 400 mL  Total IN: 400 mL    OUT:    Voided: 1220 mL  Total OUT: 1220 mL    Total NET: -820 mL          ** PHYSICAL EXAM **    -- CONSTITUTIONAL: Alert, Awake. NAD.   -- RESPIRATORY: unlabored breathing, no respiratory distress  -- RLE: 3 wounds with vacs in place, all holding suction, no surrounding erythema      ** LABS **              CAPILLARY BLOOD GLUCOSE      POCT Blood Glucose.: 150 mg/dL (28 Dec 2017 15:59)  POCT Blood Glucose.: 134 mg/dL (28 Dec 2017 11:13)  POCT Blood Glucose.: 127 mg/dL (28 Dec 2017 06:09)  POCT Blood Glucose.: 125 mg/dL (27 Dec 2017 20:57)

## 2017-12-28 NOTE — PROGRESS NOTE ADULT - ASSESSMENT
63 y/o male s/p RLE nonhealing ulcers x 3 s/p debridement s/p Aortobifem bypass (12/19)  - continue vac therapy with veriflow  - keep RLE elevated  - dispo per primary team

## 2017-12-28 NOTE — DISCHARGE NOTE ADULT - CARE PROVIDERS DIRECT ADDRESSES
[email or web address]

## 2017-12-28 NOTE — DISCHARGE NOTE ADULT - PATIENT PORTAL LINK FT
“You can access the FollowHealth Patient Portal, offered by [facility], by registering with the following website: [email or web address]”

## 2017-12-28 NOTE — PROGRESS NOTE ADULT - SUBJECTIVE AND OBJECTIVE BOX
S:61 yo M poor historian with PMHx of HTN and PVD admitted for RLE necrotic ulcers s/p debridement of RLE arterial ulcers on 12/15/17 and s/p aortofemoral bypass on 12/19/17. Has has been doing well, discharge planning to rehab.    O:  Vital Signs Last 24 Hrs  T(C): 36.4 (28 Dec 2017 16:15), Max: 37.4 (27 Dec 2017 21:30)  T(F): 97.5 (28 Dec 2017 16:15), Max: 99.3 (27 Dec 2017 21:30)  HR: 77 (28 Dec 2017 18:16) (66 - 84)  BP: 148/66 (28 Dec 2017 18:16) (148/66 - 166/70)  BP(mean): --  ABP: --  ABP(mean): --  RR: 16 (28 Dec 2017 18:16) (16 - 18)  SpO2: 98% (28 Dec 2017 18:16) (94% - 98%)    I&O's Detail    27 Dec 2017 07:01  -  28 Dec 2017 07:00  --------------------------------------------------------  IN:    IV PiggyBack: 50 mL    Oral Fluid: 300 mL  Total IN: 350 mL    OUT:    Voided: 2325 mL  Total OUT: 2325 mL    Total NET: -1975 mL      28 Dec 2017 07:01  -  28 Dec 2017 19:41  --------------------------------------------------------  IN:    Oral Fluid: 660 mL  Total IN: 660 mL    OUT:    Voided: 1640 mL  Total OUT: 1640 mL    Total NET: -980 mL          Physical Exam: gen- NAD  pulm- CTA b/l  cardio- s1s2 wnl  abd- soft nt/nd  ext- RLE- NPWT x 3 (calf and knee with promogran silver and medial wound with veraflow). Leg considerably less cellulitic overall. Foot warm and well-perfused. 	                           Assessment and Plan:    Assessment:  · Assessment		  61 yo M with RLE nonhealing ulcers and PAD and extensive RLE cellulitis s/p RLE wound debridements and aortobifemoral bypass grafting.  -appreciate ID input for knee osteomyelitis.  -Will change NPWT prior to discharge - consider silvadene to medial knee wound since veraflow canot be done as outpt.  -Plan for interval reconstruction as outpt once infection under control and stable from vascular perspective.  -WBAT, ok to be OOB and ambulate with assistance.  -Dispo planning to rehab

## 2017-12-29 VITALS — SYSTOLIC BLOOD PRESSURE: 151 MMHG | HEART RATE: 70 BPM | DIASTOLIC BLOOD PRESSURE: 68 MMHG

## 2017-12-29 LAB
GLUCOSE BLDC GLUCOMTR-MCNC: 121 MG/DL — HIGH (ref 70–99)
GLUCOSE BLDC GLUCOMTR-MCNC: 154 MG/DL — HIGH (ref 70–99)
GLUCOSE BLDC GLUCOMTR-MCNC: 188 MG/DL — HIGH (ref 70–99)

## 2017-12-29 PROCEDURE — 87102 FUNGUS ISOLATION CULTURE: CPT

## 2017-12-29 PROCEDURE — 80053 COMPREHEN METABOLIC PANEL: CPT

## 2017-12-29 PROCEDURE — 87116 MYCOBACTERIA CULTURE: CPT

## 2017-12-29 PROCEDURE — 87186 SC STD MICRODIL/AGAR DIL: CPT

## 2017-12-29 PROCEDURE — A9500: CPT

## 2017-12-29 PROCEDURE — 86923 COMPATIBILITY TEST ELECTRIC: CPT

## 2017-12-29 PROCEDURE — 84132 ASSAY OF SERUM POTASSIUM: CPT

## 2017-12-29 PROCEDURE — 85652 RBC SED RATE AUTOMATED: CPT

## 2017-12-29 PROCEDURE — 85610 PROTHROMBIN TIME: CPT

## 2017-12-29 PROCEDURE — 84484 ASSAY OF TROPONIN QUANT: CPT

## 2017-12-29 PROCEDURE — 93306 TTE W/DOPPLER COMPLETE: CPT

## 2017-12-29 PROCEDURE — 86850 RBC ANTIBODY SCREEN: CPT

## 2017-12-29 PROCEDURE — 87184 SC STD DISK METHOD PER PLATE: CPT

## 2017-12-29 PROCEDURE — 94002 VENT MGMT INPAT INIT DAY: CPT

## 2017-12-29 PROCEDURE — 71045 X-RAY EXAM CHEST 1 VIEW: CPT

## 2017-12-29 PROCEDURE — A9505: CPT

## 2017-12-29 PROCEDURE — 97530 THERAPEUTIC ACTIVITIES: CPT

## 2017-12-29 PROCEDURE — 85730 THROMBOPLASTIN TIME PARTIAL: CPT

## 2017-12-29 PROCEDURE — P9016: CPT

## 2017-12-29 PROCEDURE — 97110 THERAPEUTIC EXERCISES: CPT

## 2017-12-29 PROCEDURE — 93970 EXTREMITY STUDY: CPT

## 2017-12-29 PROCEDURE — 78452 HT MUSCLE IMAGE SPECT MULT: CPT

## 2017-12-29 PROCEDURE — 82550 ASSAY OF CK (CPK): CPT

## 2017-12-29 PROCEDURE — 99285 EMERGENCY DEPT VISIT HI MDM: CPT | Mod: 25

## 2017-12-29 PROCEDURE — 87070 CULTURE OTHR SPECIMN AEROBIC: CPT

## 2017-12-29 PROCEDURE — 83735 ASSAY OF MAGNESIUM: CPT

## 2017-12-29 PROCEDURE — 82962 GLUCOSE BLOOD TEST: CPT

## 2017-12-29 PROCEDURE — 80048 BASIC METABOLIC PNL TOTAL CA: CPT

## 2017-12-29 PROCEDURE — 93971 EXTREMITY STUDY: CPT

## 2017-12-29 PROCEDURE — 83605 ASSAY OF LACTIC ACID: CPT

## 2017-12-29 PROCEDURE — 87075 CULTR BACTERIA EXCEPT BLOOD: CPT

## 2017-12-29 PROCEDURE — 81003 URINALYSIS AUTO W/O SCOPE: CPT

## 2017-12-29 PROCEDURE — 88304 TISSUE EXAM BY PATHOLOGIST: CPT

## 2017-12-29 PROCEDURE — 86901 BLOOD TYPING SEROLOGIC RH(D): CPT

## 2017-12-29 PROCEDURE — 85025 COMPLETE CBC W/AUTO DIFF WBC: CPT

## 2017-12-29 PROCEDURE — 36569 INSJ PICC 5 YR+ W/O IMAGING: CPT

## 2017-12-29 PROCEDURE — 96375 TX/PRO/DX INJ NEW DRUG ADDON: CPT

## 2017-12-29 PROCEDURE — 84295 ASSAY OF SERUM SODIUM: CPT

## 2017-12-29 PROCEDURE — 93017 CV STRESS TEST TRACING ONLY: CPT

## 2017-12-29 PROCEDURE — 86900 BLOOD TYPING SEROLOGIC ABO: CPT

## 2017-12-29 PROCEDURE — 87206 SMEAR FLUORESCENT/ACID STAI: CPT

## 2017-12-29 PROCEDURE — 82330 ASSAY OF CALCIUM: CPT

## 2017-12-29 PROCEDURE — 93005 ELECTROCARDIOGRAM TRACING: CPT

## 2017-12-29 PROCEDURE — 97161 PT EVAL LOW COMPLEX 20 MIN: CPT

## 2017-12-29 PROCEDURE — 85027 COMPLETE CBC AUTOMATED: CPT

## 2017-12-29 PROCEDURE — 87086 URINE CULTURE/COLONY COUNT: CPT

## 2017-12-29 PROCEDURE — 76937 US GUIDE VASCULAR ACCESS: CPT

## 2017-12-29 PROCEDURE — 88311 DECALCIFY TISSUE: CPT

## 2017-12-29 PROCEDURE — 84100 ASSAY OF PHOSPHORUS: CPT

## 2017-12-29 PROCEDURE — C1768: CPT

## 2017-12-29 PROCEDURE — P9045: CPT

## 2017-12-29 PROCEDURE — 80202 ASSAY OF VANCOMYCIN: CPT

## 2017-12-29 PROCEDURE — 85018 HEMOGLOBIN: CPT

## 2017-12-29 PROCEDURE — 87040 BLOOD CULTURE FOR BACTERIA: CPT

## 2017-12-29 PROCEDURE — 75635 CT ANGIO ABDOMINAL ARTERIES: CPT

## 2017-12-29 PROCEDURE — 36415 COLL VENOUS BLD VENIPUNCTURE: CPT

## 2017-12-29 PROCEDURE — 82553 CREATINE MB FRACTION: CPT

## 2017-12-29 PROCEDURE — 83036 HEMOGLOBIN GLYCOSYLATED A1C: CPT

## 2017-12-29 PROCEDURE — 36430 TRANSFUSION BLD/BLD COMPNT: CPT

## 2017-12-29 PROCEDURE — 96365 THER/PROPH/DIAG IV INF INIT: CPT | Mod: XU

## 2017-12-29 PROCEDURE — 96366 THER/PROPH/DIAG IV INF ADDON: CPT

## 2017-12-29 PROCEDURE — 86140 C-REACTIVE PROTEIN: CPT

## 2017-12-29 PROCEDURE — 83690 ASSAY OF LIPASE: CPT

## 2017-12-29 PROCEDURE — 99232 SBSQ HOSP IP/OBS MODERATE 35: CPT

## 2017-12-29 RX ORDER — HYDROMORPHONE HYDROCHLORIDE 2 MG/ML
1 INJECTION INTRAMUSCULAR; INTRAVENOUS; SUBCUTANEOUS ONCE
Qty: 0 | Refills: 0 | Status: DISCONTINUED | OUTPATIENT
Start: 2017-12-29 | End: 2017-12-29

## 2017-12-29 RX ORDER — HEPARIN SODIUM 5000 [USP'U]/ML
7500 INJECTION INTRAVENOUS; SUBCUTANEOUS EVERY 8 HOURS
Qty: 0 | Refills: 0 | Status: DISCONTINUED | OUTPATIENT
Start: 2017-12-29 | End: 2017-12-29

## 2017-12-29 RX ORDER — AMIODARONE HYDROCHLORIDE 400 MG/1
1 TABLET ORAL
Qty: 60 | Refills: 0 | OUTPATIENT
Start: 2017-12-29 | End: 2018-01-27

## 2017-12-29 RX ADMIN — CLOPIDOGREL BISULFATE 75 MILLIGRAM(S): 75 TABLET, FILM COATED ORAL at 12:21

## 2017-12-29 RX ADMIN — Medication 81 MILLIGRAM(S): at 12:21

## 2017-12-29 RX ADMIN — AMLODIPINE BESYLATE 5 MILLIGRAM(S): 2.5 TABLET ORAL at 12:22

## 2017-12-29 RX ADMIN — Medication 50 MILLIGRAM(S): at 05:56

## 2017-12-29 RX ADMIN — CEFEPIME 100 MILLIGRAM(S): 1 INJECTION, POWDER, FOR SOLUTION INTRAMUSCULAR; INTRAVENOUS at 02:58

## 2017-12-29 RX ADMIN — HYDROMORPHONE HYDROCHLORIDE 1 MILLIGRAM(S): 2 INJECTION INTRAMUSCULAR; INTRAVENOUS; SUBCUTANEOUS at 16:00

## 2017-12-29 RX ADMIN — HYDROMORPHONE HYDROCHLORIDE 1 MILLIGRAM(S): 2 INJECTION INTRAMUSCULAR; INTRAVENOUS; SUBCUTANEOUS at 13:20

## 2017-12-29 RX ADMIN — CEFEPIME 100 MILLIGRAM(S): 1 INJECTION, POWDER, FOR SOLUTION INTRAMUSCULAR; INTRAVENOUS at 10:11

## 2017-12-29 RX ADMIN — PANTOPRAZOLE SODIUM 40 MILLIGRAM(S): 20 TABLET, DELAYED RELEASE ORAL at 05:56

## 2017-12-29 RX ADMIN — Medication 2: at 12:22

## 2017-12-29 RX ADMIN — Medication 1 APPLICATION(S): at 05:56

## 2017-12-29 RX ADMIN — HYDROMORPHONE HYDROCHLORIDE 1 MILLIGRAM(S): 2 INJECTION INTRAMUSCULAR; INTRAVENOUS; SUBCUTANEOUS at 00:36

## 2017-12-29 RX ADMIN — AMIODARONE HYDROCHLORIDE 400 MILLIGRAM(S): 400 TABLET ORAL at 05:56

## 2017-12-29 RX ADMIN — HYDROMORPHONE HYDROCHLORIDE 1 MILLIGRAM(S): 2 INJECTION INTRAMUSCULAR; INTRAVENOUS; SUBCUTANEOUS at 14:13

## 2017-12-29 RX ADMIN — HYDROMORPHONE HYDROCHLORIDE 1 MILLIGRAM(S): 2 INJECTION INTRAMUSCULAR; INTRAVENOUS; SUBCUTANEOUS at 01:36

## 2017-12-29 NOTE — PROGRESS NOTE ADULT - PROVIDER SPECIALTY LIST ADULT
Cardiology
Electrophysiology
Infectious Disease
Plastic Surgery
SICU
Vascular Surgery
Plastic Surgery

## 2017-12-29 NOTE — PROGRESS NOTE ADULT - SUBJECTIVE AND OBJECTIVE BOX
o/n: blood tinged sputum and nose bleed stopped hsq coags normal  12/28: RLE repeat duplex      62M w/ HTN & PAD c/b RLE non-healing ulcers, & chronic infrarenal aortic occulsion now s/p aorto-bifem bypass w/ Hemashielf graft 12/19.     Pain control  Home meds  Regular diet  OM: Cefepime (12/20--) ID service (Negin) following  ISS  DVT ppx wSQH  ASA 81mg po, Plavix 75mg  Wound: Bilateral groin incisions w/ Prevena vacs. Midline incision w/ island dressing. R knee wound vac (to be changed by Plastics).   BLE wounds w/ WTD Dakins dressing changes by RN daily.   OOBTC w/PT  awaiting ANTHONY o/n: blood tinged sputum and nose bleed stopped hsq coags normal  12/28: RLE repeat duplex    cefepime  IVPB   cefepime  IVPB 2000  amiodarone    Tablet 400  amLODIPine   Tablet 5  aspirin  chewable 81  cefepime  IVPB   cefepime  IVPB 2000  clopidogrel Tablet 75  heparin  Injectable 7500  labetalol Injectable 10 PRN  metoprolol succinate ER 50      Allergies    No Known Allergies    Intolerances        Vital Signs Last 24 Hrs  T(C): 36.2 (29 Dec 2017 06:40), Max: 36.4 (28 Dec 2017 16:15)  T(F): 97.1 (29 Dec 2017 06:40), Max: 97.6 (29 Dec 2017 01:01)  HR: 67 (29 Dec 2017 05:53) (67 - 84)  BP: 160/74 (29 Dec 2017 05:53) (145/75 - 166/72)  BP(mean): --  RR: 16 (29 Dec 2017 05:53) (16 - 16)  SpO2: 95% (29 Dec 2017 05:53) (95% - 98%)  I&O's Summary    28 Dec 2017 07:01  -  29 Dec 2017 07:00  --------------------------------------------------------  IN: 1130 mL / OUT: 3040 mL / NET: -1910 mL        Physical Exam:  General:  Pulmonary:  Cardiovascular:  Abdominal:  Extremities:  Pulses:   Right:                                                                          Left:  FEM [ ]2+ [ ]1+ [ ]doppler                                             FEM [ ]2+ [ ]1+ [ ]doppler    POP [ ]2+ [ ]1+ [ ]doppler                                             POP [ ]2+ [ ]1+ [ ]doppler    DP [ ]2+ [ ]1+ [ ]doppler                                                DP [ ]2+ [ ]1+ [ ]doppler  PT[ ]2+ [ ]1+ [ ]doppler                                                  PT [ ]2+ [ ]1+ [ ]doppler      LABS:          PT/INR - ( 28 Dec 2017 22:47 )   PT: 12.8 sec;   INR: 1.15          PTT - ( 28 Dec 2017 22:47 )  PTT:34.6 sec    Radiology and Additional Studies:    62M w/ HTN & PAD c/b RLE non-healing ulcers, & chronic infrarenal aortic occulsion now s/p aorto-bifem bypass w/ Hemashielf graft 12/19.     Pain control  Home meds  Regular diet  OM: Cefepime (12/20--) ID service (Negin) following  ISS  DVT ppx wSQH  ASA 81mg po, Plavix 75mg  Wound: Bilateral groin incisions w/ Prevena vacs. Midline incision w/ island dressing. R knee wound vac (to be changed by Plastics).   BLE wounds w/ WTD Dakins dressing changes by RN daily.   OOBTC w/PT  awaiting ANTHONY o/n: blood tinged sputum and nose bleed stopped hsq coags normal  12/28: RLE repeat duplex    cefepime  IVPB   cefepime  IVPB 2000  amiodarone    Tablet 400  amLODIPine   Tablet 5  aspirin  chewable 81  cefepime  IVPB   cefepime  IVPB 2000  clopidogrel Tablet 75  heparin  Injectable 7500  labetalol Injectable 10 PRN  metoprolol succinate ER 50      Allergies    No Known Allergies    Intolerances        Vital Signs Last 24 Hrs  T(C): 36.2 (29 Dec 2017 06:40), Max: 36.4 (28 Dec 2017 16:15)  T(F): 97.1 (29 Dec 2017 06:40), Max: 97.6 (29 Dec 2017 01:01)  HR: 67 (29 Dec 2017 05:53) (67 - 84)  BP: 160/74 (29 Dec 2017 05:53) (145/75 - 166/72)  BP(mean): --  RR: 16 (29 Dec 2017 05:53) (16 - 16)  SpO2: 95% (29 Dec 2017 05:53) (95% - 98%)  I&O's Summary    28 Dec 2017 07:01  -  29 Dec 2017 07:00  --------------------------------------------------------  IN: 1130 mL / OUT: 3040 mL / NET: -1910 mL      General: AAOx3, NAD  Pulmonary: no resp distress  Cardiovascular: NS  Abdominal: incision C/D/I, soft, midly distended, bilat provena VACs intact, non tender  Extremities: RLE warm, VAC dressing  intact x 3, no surrounding erythema/edema, no drainage, no calf tenderness        PT/INR - ( 28 Dec 2017 22:47 )   PT: 12.8 sec;   INR: 1.15          PTT - ( 28 Dec 2017 22:47 )  PTT:34.6 sec    Radiology and Additional Studies:    62M w/ HTN & PAD c/b RLE non-healing ulcers, & chronic infrarenal aortic occulsion now s/p aorto-bifem bypass w/ Hemashielf graft 12/19.     Pain control  Home meds  Regular diet  OM: Cefepime (12/20--) ID service (Negin) following  ISS  DVT ppx wSQH  ASA 81mg po, Plavix 75mg  Wound: Bilateral groin incisions w/ Prevena vacs. Midline incision w/ island dressing. R knee wound vac (to be changed by Plastics).   BLE wounds w/ WTD Dakins dressing changes by RN daily.   OOBTC w/PT  awaiting ANTHONY placement

## 2017-12-29 NOTE — PROGRESS NOTE ADULT - ASSESSMENT
63 yo M with RLE nonhealing ulcers and PAD and extensive RLE cellulitis s/p RLE wound debridements and aortobifemoral bypass grafting.  -appreciate ID input for knee osteomyelitis.  -Will change NPWT prior to discharge - consider silvadene to medial knee wound since veraflow canot be done as outpt.  -Plan for interval reconstruction as outpt once infection under control and stable from vascular perspective.  -WBAT, ok to be OOB and ambulate with assistance.  -Dispo planning to rehab 63 yo M with RLE nonhealing ulcers and PAD and extensive RLE cellulitis s/p RLE wound debridements and aortobifemoral bypass grafting.  -appreciate ID input for knee osteomyelitis.  -Will change NPWT prior to discharge - apply Santyl (collagenase) to medial knee wound since veraflow canot be done as outpt.  -Plan for interval reconstruction as outpt once infection under control and stable from vascular perspective.  -WBAT, ok to be OOB and ambulate with assistance.  -Dispo planning to rehab

## 2017-12-29 NOTE — PROGRESS NOTE ADULT - SUBJECTIVE AND OBJECTIVE BOX
Chief Complaint/Reason for Consult: svt  INTERVAL HPI: no eventson tele tolerating amiodarone no palp cp  	  MEDICATIONS:  amiodarone    Tablet 400 milliGRAM(s) Oral two times a day  amLODIPine   Tablet 5 milliGRAM(s) Oral every 24 hours  labetalol Injectable 10 milliGRAM(s) IV Push every 6 hours PRN  metoprolol succinate ER 50 milliGRAM(s) Oral two times a day    cefepime  IVPB      cefepime  IVPB 2000 milliGRAM(s) IV Intermittent every 8 hours      HYDROmorphone  Injectable 1 milliGRAM(s) IV Push every 4 hours PRN    bisacodyl Suppository 10 milliGRAM(s) Rectal once PRN  pantoprazole    Tablet 40 milliGRAM(s) Oral before breakfast    insulin lispro (HumaLOG) corrective regimen sliding scale   SubCutaneous every 6 hours    aspirin  chewable 81 milliGRAM(s) Oral daily  BACItracin   Ointment 1 Application(s) Topical two times a day  clopidogrel Tablet 75 milliGRAM(s) Oral daily  Dakins Solution - 1/4 Strength 1 Application(s) Topical daily  Dakins Solution - Full Strength 1 Application(s) Topical once  heparin  Injectable 7500 Unit(s) SubCutaneous every 8 hours      REVIEW OF SYSTEMS:  [x] As per HPI  CONSTITUTIONAL: No fever, weight loss, or fatigue  RESPIRATORY: No cough, wheezing, chills or hemoptysis; No Shortness of Breath  CARDIOVASCULAR: No chest pain, palpitations, dizziness, or leg swelling  GASTROINTESTINAL: No abdominal or epigastric pain. No nausea, vomiting, or hematemesis; No diarrhea or constipation. No melena or hematochezia.  MUSCULOSKELETAL: No joint pain or swelling; No muscle, back, or extremity pain  [x] All others negative	  [ ] Unable to obtain    PHYSICAL EXAM:  T(C): 36.4 (12-29-17 @ 14:11), Max: 36.4 (12-28-17 @ 16:15)  HR: 70 (12-29-17 @ 11:50) (67 - 77)  BP: 144/67 (12-29-17 @ 11:50) (144/67 - 166/72)  RR: 16 (12-29-17 @ 11:50) (16 - 16)  SpO2: 96% (12-29-17 @ 11:50) (95% - 98%)  Wt(kg): --  I&O's Summary    28 Dec 2017 07:01  -  29 Dec 2017 07:00  --------------------------------------------------------  IN: 1130 mL / OUT: 3040 mL / NET: -1910 mL    29 Dec 2017 07:01  -  29 Dec 2017 14:18  --------------------------------------------------------  IN: 120 mL / OUT: 500 mL / NET: -380 mL          Appearance: Normal	  HEENT:   Normal oral mucosa  Cardiovascular: Normal S1 S2, No JVD, No murmurs, No edema  Respiratory: Lungs clear to auscultation	  Gastrointestinal:  Soft, Non-tender, + BS	  Extremities: Normal range of motion, No clubbing, cyanosis or edema  Vascular: Peripheral pulses palpable 2+ bilaterally    TELEMETRY: 	    ECG:   	  RADIOLOGY:   CXR:  CT:  US:    CARDIAC TESTING:  Echocardiogram:  Catheterization:  Stress Test:      LABS:	 	    CARDIAC MARKERS:                    proBNP:   Lipid Profile:   HgA1c:   TSH:     ASSESSMENT/PLAN: 	    # SVT - EP input aprpeciated BB changed to long acting. amio load 400mg bid then 200 qd upon discharge    #HTN - not at goal, add noravsc 5mg po qdaily - better controlled can inc to norvasc 10mg    #CV Prevention -   q3mo Fasting Lipid Profile, Goal LDL<100, statin as tolerated.  q6week TSH check  q3mo 25-OHD Vitamin D Level, Goal 50, supplement as tolerated

## 2017-12-29 NOTE — PROGRESS NOTE ADULT - SUBJECTIVE AND OBJECTIVE BOX
S:61 yo M poor historian with PMHx of HTN and PVD admitted for RLE necrotic ulcers s/p debridement of RLE arterial ulcers on 12/15/17 and s/p aortofemoral bypass on 12/19/17. Has has been doing well, discharge planning to rehab.  No acute events overnight. Still has claudication.    O:  Vital Signs Last 24 Hrs  T(C): 36.2 (12-29-17 @ 06:40), Max: 36.4 (12-28-17 @ 16:15)  T(F): 97.1 (12-29-17 @ 06:40), Max: 97.6 (12-29-17 @ 01:01)  HR: 67 (12-29-17 @ 05:53) (67 - 84)  BP: 160/74 (12-29-17 @ 05:53) (145/75 - 166/72)  BP(mean): --  RR: 16 (12-29-17 @ 05:53) (16 - 16)  SpO2: 95% (12-29-17 @ 05:53) (95% - 98%)  I&O's Detail    28 Dec 2017 07:01  -  29 Dec 2017 07:00  --------------------------------------------------------  IN:    IV PiggyBack: 50 mL    Oral Fluid: 1080 mL  Total IN: 1130 mL    OUT:    Voided: 3040 mL  Total OUT: 3040 mL    Total NET: -1910 mL    NAD, AOx3  VACx3 holding  RLE edema but less cellulitis  Foot warm, well perfused

## 2018-01-03 DIAGNOSIS — L97.311 NON-PRESSURE CHRONIC ULCER OF RIGHT ANKLE LIMITED TO BREAKDOWN OF SKIN: ICD-10-CM

## 2018-01-03 DIAGNOSIS — L97.319 NON-PRESSURE CHRONIC ULCER OF RIGHT ANKLE WITH UNSPECIFIED SEVERITY: ICD-10-CM

## 2018-01-03 DIAGNOSIS — B95.61 METHICILLIN SUSCEPTIBLE STAPHYLOCOCCUS AUREUS INFECTION AS THE CAUSE OF DISEASES CLASSIFIED ELSEWHERE: ICD-10-CM

## 2018-01-03 DIAGNOSIS — I74.09 OTHER ARTERIAL EMBOLISM AND THROMBOSIS OF ABDOMINAL AORTA: ICD-10-CM

## 2018-01-03 DIAGNOSIS — I74.3 EMBOLISM AND THROMBOSIS OF ARTERIES OF THE LOWER EXTREMITIES: ICD-10-CM

## 2018-01-03 DIAGNOSIS — Z79.02 LONG TERM (CURRENT) USE OF ANTITHROMBOTICS/ANTIPLATELETS: ICD-10-CM

## 2018-01-03 DIAGNOSIS — M86.9 OSTEOMYELITIS, UNSPECIFIED: ICD-10-CM

## 2018-01-03 DIAGNOSIS — Z28.21 IMMUNIZATION NOT CARRIED OUT BECAUSE OF PATIENT REFUSAL: ICD-10-CM

## 2018-01-03 DIAGNOSIS — I47.1 SUPRAVENTRICULAR TACHYCARDIA: ICD-10-CM

## 2018-01-03 DIAGNOSIS — Z79.82 LONG TERM (CURRENT) USE OF ASPIRIN: ICD-10-CM

## 2018-01-03 DIAGNOSIS — I25.10 ATHEROSCLEROTIC HEART DISEASE OF NATIVE CORONARY ARTERY WITHOUT ANGINA PECTORIS: ICD-10-CM

## 2018-01-03 DIAGNOSIS — N17.9 ACUTE KIDNEY FAILURE, UNSPECIFIED: ICD-10-CM

## 2018-01-03 DIAGNOSIS — L03.116 CELLULITIS OF LEFT LOWER LIMB: ICD-10-CM

## 2018-01-03 DIAGNOSIS — I70.233 ATHEROSCLEROSIS OF NATIVE ARTERIES OF RIGHT LEG WITH ULCERATION OF ANKLE: ICD-10-CM

## 2018-01-03 DIAGNOSIS — B95.2 ENTEROCOCCUS AS THE CAUSE OF DISEASES CLASSIFIED ELSEWHERE: ICD-10-CM

## 2018-01-03 DIAGNOSIS — I70.221 ATHEROSCLEROSIS OF NATIVE ARTERIES OF EXTREMITIES WITH REST PAIN, RIGHT LEG: ICD-10-CM

## 2018-01-03 DIAGNOSIS — I10 ESSENTIAL (PRIMARY) HYPERTENSION: ICD-10-CM

## 2018-01-03 DIAGNOSIS — B95.7 OTHER STAPHYLOCOCCUS AS THE CAUSE OF DISEASES CLASSIFIED ELSEWHERE: ICD-10-CM

## 2018-01-03 DIAGNOSIS — L97.812 NON-PRESSURE CHRONIC ULCER OF OTHER PART OF RIGHT LOWER LEG WITH FAT LAYER EXPOSED: ICD-10-CM

## 2018-01-13 LAB
CULTURE RESULTS: SIGNIFICANT CHANGE UP
SPECIMEN SOURCE: SIGNIFICANT CHANGE UP

## 2018-02-03 LAB
CULTURE RESULTS: SIGNIFICANT CHANGE UP
SPECIMEN SOURCE: SIGNIFICANT CHANGE UP

## 2018-02-12 ENCOUNTER — APPOINTMENT (OUTPATIENT)
Dept: HEART AND VASCULAR | Facility: CLINIC | Age: 63
End: 2018-02-12
Payer: COMMERCIAL

## 2018-02-12 ENCOUNTER — APPOINTMENT (OUTPATIENT)
Dept: VASCULAR SURGERY | Facility: CLINIC | Age: 63
End: 2018-02-12
Payer: COMMERCIAL

## 2018-02-12 ENCOUNTER — APPOINTMENT (OUTPATIENT)
Dept: VASCULAR SURGERY | Facility: CLINIC | Age: 63
End: 2018-02-12

## 2018-02-12 VITALS — WEIGHT: 230 LBS | BODY MASS INDEX: 28.6 KG/M2 | HEIGHT: 75 IN

## 2018-02-12 VITALS
DIASTOLIC BLOOD PRESSURE: 77 MMHG | WEIGHT: 230 LBS | OXYGEN SATURATION: 99 % | HEIGHT: 75 IN | HEART RATE: 54 BPM | BODY MASS INDEX: 28.6 KG/M2 | SYSTOLIC BLOOD PRESSURE: 172 MMHG

## 2018-02-12 VITALS — SYSTOLIC BLOOD PRESSURE: 180 MMHG | HEART RATE: 54 BPM | DIASTOLIC BLOOD PRESSURE: 81 MMHG

## 2018-02-12 DIAGNOSIS — Z78.9 OTHER SPECIFIED HEALTH STATUS: ICD-10-CM

## 2018-02-12 DIAGNOSIS — I10 ESSENTIAL (PRIMARY) HYPERTENSION: ICD-10-CM

## 2018-02-12 DIAGNOSIS — I74.09 OTHER ARTERIAL EMBOLISM AND THROMBOSIS OF ABDOMINAL AORTA: ICD-10-CM

## 2018-02-12 DIAGNOSIS — Z86.79 PERSONAL HISTORY OF OTHER DISEASES OF THE CIRCULATORY SYSTEM: ICD-10-CM

## 2018-02-12 DIAGNOSIS — Z87.891 PERSONAL HISTORY OF NICOTINE DEPENDENCE: ICD-10-CM

## 2018-02-12 DIAGNOSIS — Z87.898 PERSONAL HISTORY OF OTHER SPECIFIED CONDITIONS: ICD-10-CM

## 2018-02-12 DIAGNOSIS — I70.209 UNSPECIFIED ATHEROSCLEROSIS OF NATIVE ARTERIES OF EXTREMITIES, UNSPECIFIED EXTREMITY: ICD-10-CM

## 2018-02-12 PROCEDURE — 99214 OFFICE O/P EST MOD 30 MIN: CPT | Mod: 24

## 2018-02-12 PROCEDURE — 93978 VASCULAR STUDY: CPT | Mod: 79

## 2018-02-12 PROCEDURE — 93000 ELECTROCARDIOGRAM COMPLETE: CPT

## 2018-02-12 PROCEDURE — 99214 OFFICE O/P EST MOD 30 MIN: CPT | Mod: 25

## 2018-02-12 RX ORDER — WARFARIN 4 MG/1
TABLET ORAL
Refills: 0 | Status: ACTIVE | COMMUNITY

## 2018-02-13 PROBLEM — I74.09 CHRONIC DISTAL AORTIC OCCLUSION: Status: ACTIVE | Noted: 2018-02-13

## 2018-03-09 ENCOUNTER — APPOINTMENT (OUTPATIENT)
Dept: HEART AND VASCULAR | Facility: CLINIC | Age: 63
End: 2018-03-09
Payer: COMMERCIAL

## 2018-03-09 PROCEDURE — 93268 ECG RECORD/REVIEW: CPT

## 2018-03-12 ENCOUNTER — APPOINTMENT (OUTPATIENT)
Dept: HEART AND VASCULAR | Facility: CLINIC | Age: 63
End: 2018-03-12

## 2018-07-26 PROBLEM — Z78.9 ALCOHOL USE: Status: ACTIVE | Noted: 2018-02-12

## 2022-03-09 NOTE — ED PROVIDER NOTE - CROS ED CARDIOVAS ALL NEG
Nutrition Problem #1: Severe malnutrition,In context of chronic illness  Intervention: Food and/or Nutrient Delivery: Continue Current Parenteral Nutrition,Continue Current Diet,Continue Oral Nutrition Supplement  Nutritional Goals: Po intake >25% of meals and ONS and tolerance of PN to meet nutritional needs. - - -

## 2022-09-27 NOTE — ED ADULT NURSE NOTE - GASTROINTESTINAL WDL
3rd attempt to reach patient. Will mail letter today 9/27. Routing to the RN's for awareness only.     Abdomen soft, nontender, nondistended, bowel sounds present in all 4 quadrants.

## 2024-09-19 NOTE — PHYSICAL THERAPY INITIAL EVALUATION ADULT - NAME OF DISCHARGE PLANNER
Problem: Physical Therapy  Goal: Physical Therapy Goal  Description: Goals to be met by: 10/3/2024     Patient will increase functional independence with mobility by performin. Supine to sit with Stand-by Assistance  2. Sit to stand transfer with Supervision  3. Gait  x 300 feet with Supervision using No Assistive Device.   4. Squat to retrieve items from floor level with supervision with no use of AD     Outcome: Progressing     Pt evaluated and appropriate goals established.     Sho